# Patient Record
Sex: FEMALE | Race: WHITE | NOT HISPANIC OR LATINO | Employment: UNEMPLOYED | URBAN - METROPOLITAN AREA
[De-identification: names, ages, dates, MRNs, and addresses within clinical notes are randomized per-mention and may not be internally consistent; named-entity substitution may affect disease eponyms.]

---

## 2017-01-09 ENCOUNTER — ALLSCRIPTS OFFICE VISIT (OUTPATIENT)
Dept: OTHER | Facility: OTHER | Age: 52
End: 2017-01-09

## 2017-01-12 ENCOUNTER — HOSPITAL ENCOUNTER (OUTPATIENT)
Dept: RADIOLOGY | Facility: MEDICAL CENTER | Age: 52
Discharge: HOME | End: 2017-01-12
Payer: COMMERCIAL

## 2017-01-12 DIAGNOSIS — R14.0 ABDOMINAL DISTENSION (GASEOUS): ICD-10-CM

## 2017-01-12 DIAGNOSIS — R19.8 OTHER SPECIFIED SYMPTOMS AND SIGNS INVOLVING THE DIGESTIVE SYSTEM AND ABDOMEN: ICD-10-CM

## 2017-01-12 DIAGNOSIS — K21.9 GASTRO-ESOPHAGEAL REFLUX DISEASE WITHOUT ESOPHAGITIS: ICD-10-CM

## 2017-01-12 PROCEDURE — 76700 US EXAM ABDOM COMPLETE: CPT

## 2017-01-13 ENCOUNTER — APPOINTMENT (EMERGENCY)
Dept: CT IMAGING | Facility: HOSPITAL | Age: 52
End: 2017-01-13
Payer: COMMERCIAL

## 2017-01-13 ENCOUNTER — HOSPITAL ENCOUNTER (EMERGENCY)
Facility: HOSPITAL | Age: 52
Discharge: HOME | End: 2017-01-13
Attending: EMERGENCY MEDICINE | Admitting: EMERGENCY MEDICINE
Payer: COMMERCIAL

## 2017-01-13 VITALS
TEMPERATURE: 98 F | HEART RATE: 64 BPM | BODY MASS INDEX: 23.04 KG/M2 | WEIGHT: 130 LBS | SYSTOLIC BLOOD PRESSURE: 113 MMHG | RESPIRATION RATE: 18 BRPM | HEIGHT: 63 IN | OXYGEN SATURATION: 96 % | DIASTOLIC BLOOD PRESSURE: 65 MMHG

## 2017-01-13 DIAGNOSIS — K62.5 RECTAL BLEEDING: Primary | ICD-10-CM

## 2017-01-13 LAB
ALBUMIN SERPL BCP-MCNC: 3.9 G/DL (ref 3.5–5)
ALP SERPL-CCNC: 117 U/L (ref 46–116)
ALT SERPL W P-5'-P-CCNC: 37 U/L (ref 12–78)
ANION GAP SERPL CALCULATED.3IONS-SCNC: 7 MMOL/L (ref 4–13)
AST SERPL W P-5'-P-CCNC: 17 U/L (ref 5–45)
BASOPHILS # BLD AUTO: 0.02 THOUSANDS/ΜL (ref 0–0.1)
BASOPHILS NFR BLD AUTO: 0 % (ref 0–1)
BILIRUB DIRECT SERPL-MCNC: 0.04 MG/DL (ref 0–0.2)
BILIRUB SERPL-MCNC: 0.2 MG/DL (ref 0.2–1)
BUN SERPL-MCNC: 15 MG/DL (ref 5–25)
CALCIUM SERPL-MCNC: 8.8 MG/DL (ref 8.3–10.1)
CHLORIDE SERPL-SCNC: 104 MMOL/L (ref 100–108)
CO2 SERPL-SCNC: 30 MMOL/L (ref 21–32)
CREAT SERPL-MCNC: 0.64 MG/DL (ref 0.6–1.3)
EOSINOPHIL # BLD AUTO: 0.14 THOUSAND/ΜL (ref 0–0.61)
EOSINOPHIL NFR BLD AUTO: 2 % (ref 0–6)
ERYTHROCYTE [DISTWIDTH] IN BLOOD BY AUTOMATED COUNT: 11.9 % (ref 11.6–15.1)
GFR SERPL CREATININE-BSD FRML MDRD: >60 ML/MIN/1.73SQ M
GLUCOSE SERPL-MCNC: 229 MG/DL (ref 65–140)
HCT VFR BLD AUTO: 39 % (ref 34.8–46.1)
HGB BLD-MCNC: 13 G/DL (ref 11.5–15.4)
LYMPHOCYTES # BLD AUTO: 2.63 THOUSANDS/ΜL (ref 0.6–4.47)
LYMPHOCYTES NFR BLD AUTO: 39 % (ref 14–44)
MCH RBC QN AUTO: 28.7 PG (ref 26.8–34.3)
MCHC RBC AUTO-ENTMCNC: 33.3 G/DL (ref 31.4–37.4)
MCV RBC AUTO: 86 FL (ref 82–98)
MONOCYTES # BLD AUTO: 0.41 THOUSAND/ΜL (ref 0.17–1.22)
MONOCYTES NFR BLD AUTO: 6 % (ref 4–12)
NEUTROPHILS # BLD AUTO: 3.51 THOUSANDS/ΜL (ref 1.85–7.62)
NEUTS SEG NFR BLD AUTO: 52 % (ref 43–75)
NRBC BLD AUTO-RTO: 0 /100 WBCS
PLATELET # BLD AUTO: 195 THOUSANDS/UL (ref 149–390)
PMV BLD AUTO: 9.3 FL (ref 8.9–12.7)
POTASSIUM SERPL-SCNC: 4 MMOL/L (ref 3.5–5.3)
PROT SERPL-MCNC: 7.6 G/DL (ref 6.4–8.2)
RBC # BLD AUTO: 4.53 MILLION/UL (ref 3.81–5.12)
SODIUM SERPL-SCNC: 141 MMOL/L (ref 136–145)
WBC # BLD AUTO: 6.74 THOUSAND/UL (ref 4.31–10.16)

## 2017-01-13 PROCEDURE — 82272 OCCULT BLD FECES 1-3 TESTS: CPT

## 2017-01-13 PROCEDURE — 74177 CT ABD & PELVIS W/CONTRAST: CPT

## 2017-01-13 PROCEDURE — 85025 COMPLETE CBC W/AUTO DIFF WBC: CPT | Performed by: EMERGENCY MEDICINE

## 2017-01-13 PROCEDURE — 99285 EMERGENCY DEPT VISIT HI MDM: CPT

## 2017-01-13 PROCEDURE — 36415 COLL VENOUS BLD VENIPUNCTURE: CPT | Performed by: EMERGENCY MEDICINE

## 2017-01-13 PROCEDURE — 80048 BASIC METABOLIC PNL TOTAL CA: CPT | Performed by: EMERGENCY MEDICINE

## 2017-01-13 PROCEDURE — 80076 HEPATIC FUNCTION PANEL: CPT | Performed by: EMERGENCY MEDICINE

## 2017-01-13 RX ORDER — LISINOPRIL 5 MG/1
5 TABLET ORAL DAILY
COMMUNITY
Start: 2016-12-21 | End: 2018-02-28 | Stop reason: SDUPTHER

## 2017-01-13 RX ORDER — OMEPRAZOLE 20 MG/1
20 CAPSULE, DELAYED RELEASE ORAL DAILY
COMMUNITY
Start: 2017-01-09 | End: 2018-02-28 | Stop reason: SDUPTHER

## 2017-01-13 RX ORDER — ATORVASTATIN CALCIUM 10 MG/1
10 TABLET, FILM COATED ORAL DAILY
COMMUNITY
Start: 2016-12-21 | End: 2018-02-28 | Stop reason: SDUPTHER

## 2017-01-13 RX ADMIN — IOHEXOL 100 ML: 350 INJECTION, SOLUTION INTRAVENOUS at 21:43

## 2017-01-18 ENCOUNTER — GENERIC CONVERSION - ENCOUNTER (OUTPATIENT)
Dept: OTHER | Facility: OTHER | Age: 52
End: 2017-01-18

## 2017-01-26 ENCOUNTER — ALLSCRIPTS OFFICE VISIT (OUTPATIENT)
Dept: OTHER | Facility: OTHER | Age: 52
End: 2017-01-26

## 2017-01-30 RX ORDER — SODIUM CHLORIDE, SODIUM LACTATE, POTASSIUM CHLORIDE, CALCIUM CHLORIDE 600; 310; 30; 20 MG/100ML; MG/100ML; MG/100ML; MG/100ML
100 INJECTION, SOLUTION INTRAVENOUS CONTINUOUS
Status: CANCELLED | OUTPATIENT
Start: 2017-02-03

## 2017-02-02 ENCOUNTER — ANESTHESIA EVENT (OUTPATIENT)
Dept: PERIOP | Facility: HOSPITAL | Age: 52
End: 2017-02-02
Payer: COMMERCIAL

## 2017-02-02 RX ORDER — CALCIUM CARBONATE 200(500)MG
1 TABLET,CHEWABLE ORAL DAILY
COMMUNITY

## 2017-02-02 RX ORDER — ASCORBIC ACID 500 MG
500 TABLET ORAL DAILY
COMMUNITY
End: 2020-04-17 | Stop reason: SDUPTHER

## 2017-02-02 RX ORDER — DOCUSATE SODIUM 100 MG/1
100 CAPSULE, LIQUID FILLED ORAL 2 TIMES DAILY PRN
COMMUNITY

## 2017-02-02 RX ORDER — ASPIRIN 325 MG
325 TABLET ORAL DAILY
COMMUNITY
End: 2019-02-26

## 2017-02-02 RX ORDER — DIMENHYDRINATE 50 MG/1
50 TABLET ORAL 2 TIMES DAILY
COMMUNITY

## 2017-02-03 ENCOUNTER — ANESTHESIA (OUTPATIENT)
Dept: PERIOP | Facility: HOSPITAL | Age: 52
End: 2017-02-03
Payer: COMMERCIAL

## 2017-02-03 ENCOUNTER — HOSPITAL ENCOUNTER (OUTPATIENT)
Facility: HOSPITAL | Age: 52
Setting detail: OUTPATIENT SURGERY
Discharge: HOME | End: 2017-02-03
Attending: SURGERY | Admitting: SURGERY
Payer: COMMERCIAL

## 2017-02-03 VITALS
SYSTOLIC BLOOD PRESSURE: 105 MMHG | TEMPERATURE: 97.5 F | HEIGHT: 63 IN | WEIGHT: 130 LBS | HEART RATE: 63 BPM | BODY MASS INDEX: 23.04 KG/M2 | RESPIRATION RATE: 20 BRPM | DIASTOLIC BLOOD PRESSURE: 66 MMHG | OXYGEN SATURATION: 97 %

## 2017-02-03 DIAGNOSIS — K62.5 RECTAL BLEEDING: ICD-10-CM

## 2017-02-03 DIAGNOSIS — K21.9 GERD (GASTROESOPHAGEAL REFLUX DISEASE): ICD-10-CM

## 2017-02-03 PROBLEM — K29.70 GASTRITIS: Status: ACTIVE | Noted: 2017-02-03

## 2017-02-03 PROBLEM — K63.5 COLON POLYP: Status: ACTIVE | Noted: 2017-02-03

## 2017-02-03 LAB — GLUCOSE SERPL-MCNC: 161 MG/DL (ref 65–140)

## 2017-02-03 PROCEDURE — 88342 IMHCHEM/IMCYTCHM 1ST ANTB: CPT | Performed by: SURGERY

## 2017-02-03 PROCEDURE — 82948 REAGENT STRIP/BLOOD GLUCOSE: CPT

## 2017-02-03 PROCEDURE — 88305 TISSUE EXAM BY PATHOLOGIST: CPT | Performed by: SURGERY

## 2017-02-03 PROCEDURE — 88341 IMHCHEM/IMCYTCHM EA ADD ANTB: CPT | Performed by: SURGERY

## 2017-02-03 RX ORDER — LIDOCAINE HYDROCHLORIDE 10 MG/ML
INJECTION, SOLUTION INFILTRATION; PERINEURAL AS NEEDED
Status: DISCONTINUED | OUTPATIENT
Start: 2017-02-03 | End: 2017-02-03 | Stop reason: SURG

## 2017-02-03 RX ORDER — SODIUM CHLORIDE, SODIUM LACTATE, POTASSIUM CHLORIDE, CALCIUM CHLORIDE 600; 310; 30; 20 MG/100ML; MG/100ML; MG/100ML; MG/100ML
50 INJECTION, SOLUTION INTRAVENOUS CONTINUOUS
Status: DISCONTINUED | OUTPATIENT
Start: 2017-02-03 | End: 2017-02-03 | Stop reason: HOSPADM

## 2017-02-03 RX ORDER — PROPOFOL 10 MG/ML
INJECTION, EMULSION INTRAVENOUS AS NEEDED
Status: DISCONTINUED | OUTPATIENT
Start: 2017-02-03 | End: 2017-02-03 | Stop reason: SURG

## 2017-02-03 RX ORDER — PROPOFOL 10 MG/ML
INJECTION, EMULSION INTRAVENOUS CONTINUOUS PRN
Status: DISCONTINUED | OUTPATIENT
Start: 2017-02-03 | End: 2017-02-03 | Stop reason: SURG

## 2017-02-03 RX ADMIN — PROPOFOL 150 MG: 10 INJECTION, EMULSION INTRAVENOUS at 09:59

## 2017-02-03 RX ADMIN — SODIUM CHLORIDE, SODIUM LACTATE, POTASSIUM CHLORIDE, AND CALCIUM CHLORIDE 50 ML/HR: .6; .31; .03; .02 INJECTION, SOLUTION INTRAVENOUS at 09:31

## 2017-02-03 RX ADMIN — PROPOFOL 110 MCG/KG/MIN: 10 INJECTION, EMULSION INTRAVENOUS at 10:02

## 2017-02-03 RX ADMIN — PROPOFOL 30 MG: 10 INJECTION, EMULSION INTRAVENOUS at 10:10

## 2017-02-03 RX ADMIN — LIDOCAINE HYDROCHLORIDE 50 MG: 10 INJECTION, SOLUTION INFILTRATION; PERINEURAL at 09:55

## 2017-02-03 RX ADMIN — SODIUM CHLORIDE, SODIUM LACTATE, POTASSIUM CHLORIDE, AND CALCIUM CHLORIDE: .6; .31; .03; .02 INJECTION, SOLUTION INTRAVENOUS at 09:54

## 2017-02-13 ENCOUNTER — ALLSCRIPTS OFFICE VISIT (OUTPATIENT)
Dept: OTHER | Facility: OTHER | Age: 52
End: 2017-02-13

## 2017-02-16 ENCOUNTER — ALLSCRIPTS OFFICE VISIT (OUTPATIENT)
Dept: OTHER | Facility: OTHER | Age: 52
End: 2017-02-16

## 2017-02-16 ENCOUNTER — TRANSCRIBE ORDERS (OUTPATIENT)
Dept: ADMINISTRATIVE | Facility: HOSPITAL | Age: 52
End: 2017-02-16

## 2017-02-16 DIAGNOSIS — K43.6 OTHER AND UNSPECIFIED VENTRAL HERNIA WITH OBSTRUCTION, WITHOUT GANGRENE: ICD-10-CM

## 2017-02-16 DIAGNOSIS — K43.6: Primary | ICD-10-CM

## 2017-03-01 ENCOUNTER — HOSPITAL ENCOUNTER (OUTPATIENT)
Dept: CT IMAGING | Facility: HOSPITAL | Age: 52
Discharge: HOME | End: 2017-03-01
Attending: SURGERY
Payer: COMMERCIAL

## 2017-03-01 ENCOUNTER — OFFICE VISIT (OUTPATIENT)
Dept: LAB | Facility: HOSPITAL | Age: 52
End: 2017-03-01
Attending: SURGERY
Payer: COMMERCIAL

## 2017-03-01 DIAGNOSIS — K43.6: ICD-10-CM

## 2017-03-01 DIAGNOSIS — K43.6 OTHER AND UNSPECIFIED VENTRAL HERNIA WITH OBSTRUCTION, WITHOUT GANGRENE: ICD-10-CM

## 2017-03-01 LAB
ATRIAL RATE: 57 BPM
P AXIS: 48 DEGREES
PR INTERVAL: 148 MS
QRS AXIS: 21 DEGREES
QRSD INTERVAL: 92 MS
QT INTERVAL: 436 MS
QTC INTERVAL: 424 MS
T WAVE AXIS: 14 DEGREES
VENTRICULAR RATE: 57 BPM

## 2017-03-01 PROCEDURE — 93005 ELECTROCARDIOGRAM TRACING: CPT

## 2017-03-01 PROCEDURE — 74177 CT ABD & PELVIS W/CONTRAST: CPT

## 2017-03-01 RX ADMIN — IOHEXOL 100 ML: 350 INJECTION, SOLUTION INTRAVENOUS at 08:57

## 2017-03-09 ENCOUNTER — TRANSCRIBE ORDERS (OUTPATIENT)
Dept: ADMINISTRATIVE | Facility: HOSPITAL | Age: 52
End: 2017-03-09

## 2017-03-09 ENCOUNTER — ALLSCRIPTS OFFICE VISIT (OUTPATIENT)
Dept: OTHER | Facility: OTHER | Age: 52
End: 2017-03-09

## 2017-03-09 ENCOUNTER — APPOINTMENT (OUTPATIENT)
Dept: LAB | Facility: MEDICAL CENTER | Age: 52
End: 2017-03-09
Payer: COMMERCIAL

## 2017-03-09 DIAGNOSIS — E11.9 TYPE 2 DIABETES MELLITUS WITHOUT COMPLICATION, WITHOUT LONG-TERM CURRENT USE OF INSULIN (HCC): ICD-10-CM

## 2017-03-09 DIAGNOSIS — E11.9 TYPE 2 DIABETES MELLITUS WITHOUT COMPLICATION, WITHOUT LONG-TERM CURRENT USE OF INSULIN (HCC): Primary | ICD-10-CM

## 2017-03-09 LAB
ALBUMIN SERPL BCP-MCNC: 3.8 G/DL (ref 3.5–5)
ALP SERPL-CCNC: 112 U/L (ref 46–116)
ALT SERPL W P-5'-P-CCNC: 28 U/L (ref 12–78)
ANION GAP SERPL CALCULATED.3IONS-SCNC: 7 MMOL/L (ref 4–13)
AST SERPL W P-5'-P-CCNC: 12 U/L (ref 5–45)
BASOPHILS # BLD AUTO: 0.01 THOUSANDS/ΜL (ref 0–0.1)
BASOPHILS NFR BLD AUTO: 0 % (ref 0–1)
BILIRUB SERPL-MCNC: 0.77 MG/DL (ref 0.2–1)
BUN SERPL-MCNC: 12 MG/DL (ref 5–25)
CALCIUM SERPL-MCNC: 9.6 MG/DL (ref 8.3–10.1)
CHLORIDE SERPL-SCNC: 105 MMOL/L (ref 100–108)
CO2 SERPL-SCNC: 30 MMOL/L (ref 21–32)
CREAT SERPL-MCNC: 0.65 MG/DL (ref 0.6–1.3)
EOSINOPHIL # BLD AUTO: 0.17 THOUSAND/ΜL (ref 0–0.61)
EOSINOPHIL NFR BLD AUTO: 3 % (ref 0–6)
ERYTHROCYTE [DISTWIDTH] IN BLOOD BY AUTOMATED COUNT: 12.2 % (ref 11.6–15.1)
EST. AVERAGE GLUCOSE BLD GHB EST-MCNC: 194 MG/DL
GFR SERPL CREATININE-BSD FRML MDRD: >60 ML/MIN/1.73SQ M
GLUCOSE SERPL-MCNC: 185 MG/DL (ref 65–140)
HBA1C MFR BLD: 8.4 % (ref 4.2–6.3)
HCT VFR BLD AUTO: 42.2 % (ref 34.8–46.1)
HGB BLD-MCNC: 14.2 G/DL (ref 11.5–15.4)
LYMPHOCYTES # BLD AUTO: 2.67 THOUSANDS/ΜL (ref 0.6–4.47)
LYMPHOCYTES NFR BLD AUTO: 49 % (ref 14–44)
MCH RBC QN AUTO: 29.4 PG (ref 26.8–34.3)
MCHC RBC AUTO-ENTMCNC: 33.6 G/DL (ref 31.4–37.4)
MCV RBC AUTO: 87 FL (ref 82–98)
MONOCYTES # BLD AUTO: 0.25 THOUSAND/ΜL (ref 0.17–1.22)
MONOCYTES NFR BLD AUTO: 5 % (ref 4–12)
NEUTROPHILS # BLD AUTO: 2.35 THOUSANDS/ΜL (ref 1.85–7.62)
NEUTS SEG NFR BLD AUTO: 43 % (ref 43–75)
NRBC BLD AUTO-RTO: 0 /100 WBCS
PLATELET # BLD AUTO: 200 THOUSANDS/UL (ref 149–390)
PMV BLD AUTO: 10.8 FL (ref 8.9–12.7)
POTASSIUM SERPL-SCNC: 4.2 MMOL/L (ref 3.5–5.3)
PROT SERPL-MCNC: 7.6 G/DL (ref 6.4–8.2)
RBC # BLD AUTO: 4.83 MILLION/UL (ref 3.81–5.12)
SODIUM SERPL-SCNC: 142 MMOL/L (ref 136–145)
WBC # BLD AUTO: 5.45 THOUSAND/UL (ref 4.31–10.16)

## 2017-03-09 PROCEDURE — 85025 COMPLETE CBC W/AUTO DIFF WBC: CPT

## 2017-03-09 PROCEDURE — 83036 HEMOGLOBIN GLYCOSYLATED A1C: CPT | Performed by: FAMILY MEDICINE

## 2017-03-09 PROCEDURE — 36415 COLL VENOUS BLD VENIPUNCTURE: CPT | Performed by: FAMILY MEDICINE

## 2017-03-09 PROCEDURE — 80053 COMPREHEN METABOLIC PANEL: CPT

## 2017-03-16 ENCOUNTER — ANESTHESIA EVENT (OUTPATIENT)
Dept: PERIOP | Facility: HOSPITAL | Age: 52
End: 2017-03-16
Payer: COMMERCIAL

## 2017-03-17 ENCOUNTER — HOSPITAL ENCOUNTER (OUTPATIENT)
Facility: HOSPITAL | Age: 52
Setting detail: OUTPATIENT SURGERY
Discharge: HOME | End: 2017-03-17
Attending: SURGERY | Admitting: SURGERY
Payer: COMMERCIAL

## 2017-03-17 ENCOUNTER — ANESTHESIA (OUTPATIENT)
Dept: PERIOP | Facility: HOSPITAL | Age: 52
End: 2017-03-17
Payer: COMMERCIAL

## 2017-03-17 VITALS
WEIGHT: 130.51 LBS | HEIGHT: 63 IN | TEMPERATURE: 99.2 F | DIASTOLIC BLOOD PRESSURE: 55 MMHG | RESPIRATION RATE: 12 BRPM | BODY MASS INDEX: 23.12 KG/M2 | SYSTOLIC BLOOD PRESSURE: 109 MMHG | OXYGEN SATURATION: 95 % | HEART RATE: 83 BPM

## 2017-03-17 PROBLEM — K43.6 VENTRAL HERNIA WITH OBSTRUCTION AND WITHOUT GANGRENE: Status: ACTIVE | Noted: 2017-03-17

## 2017-03-17 PROBLEM — K42.9 UMBILICAL HERNIA WITHOUT OBSTRUCTION AND WITHOUT GANGRENE: Status: ACTIVE | Noted: 2017-03-17

## 2017-03-17 LAB
GLUCOSE SERPL-MCNC: 150 MG/DL (ref 65–140)
GLUCOSE SERPL-MCNC: 188 MG/DL (ref 65–140)

## 2017-03-17 PROCEDURE — A9270 NON-COVERED ITEM OR SERVICE: HCPCS | Performed by: SURGERY

## 2017-03-17 PROCEDURE — 82948 REAGENT STRIP/BLOOD GLUCOSE: CPT

## 2017-03-17 PROCEDURE — C1781 MESH (IMPLANTABLE): HCPCS | Performed by: SURGERY

## 2017-03-17 PROCEDURE — A9270 NON-COVERED ITEM OR SERVICE: HCPCS | Performed by: STUDENT IN AN ORGANIZED HEALTH CARE EDUCATION/TRAINING PROGRAM

## 2017-03-17 DEVICE — VENTRALIGHT ST MESH
Type: IMPLANTABLE DEVICE | Site: UMBILICAL | Status: FUNCTIONAL
Brand: VENTRALIGHT ST

## 2017-03-17 RX ORDER — LIDOCAINE HYDROCHLORIDE 10 MG/ML
INJECTION, SOLUTION INFILTRATION; PERINEURAL AS NEEDED
Status: DISCONTINUED | OUTPATIENT
Start: 2017-03-17 | End: 2017-03-17 | Stop reason: SURG

## 2017-03-17 RX ORDER — PROMETHAZINE HYDROCHLORIDE 25 MG/ML
12.5 INJECTION, SOLUTION INTRAMUSCULAR; INTRAVENOUS ONCE AS NEEDED
Status: DISCONTINUED | OUTPATIENT
Start: 2017-03-17 | End: 2017-03-17 | Stop reason: HOSPADM

## 2017-03-17 RX ORDER — SODIUM CHLORIDE, SODIUM LACTATE, POTASSIUM CHLORIDE, CALCIUM CHLORIDE 600; 310; 30; 20 MG/100ML; MG/100ML; MG/100ML; MG/100ML
75 INJECTION, SOLUTION INTRAVENOUS CONTINUOUS
Status: DISCONTINUED | OUTPATIENT
Start: 2017-03-17 | End: 2017-03-17 | Stop reason: HOSPADM

## 2017-03-17 RX ORDER — PROPOFOL 10 MG/ML
INJECTION, EMULSION INTRAVENOUS AS NEEDED
Status: DISCONTINUED | OUTPATIENT
Start: 2017-03-17 | End: 2017-03-17 | Stop reason: SURG

## 2017-03-17 RX ORDER — ONDANSETRON 2 MG/ML
4 INJECTION INTRAMUSCULAR; INTRAVENOUS ONCE
Status: DISCONTINUED | OUTPATIENT
Start: 2017-03-17 | End: 2017-03-17 | Stop reason: HOSPADM

## 2017-03-17 RX ORDER — FENTANYL CITRATE 50 UG/ML
INJECTION, SOLUTION INTRAMUSCULAR; INTRAVENOUS AS NEEDED
Status: DISCONTINUED | OUTPATIENT
Start: 2017-03-17 | End: 2017-03-17 | Stop reason: SURG

## 2017-03-17 RX ORDER — CLINDAMYCIN PHOSPHATE 900 MG/50ML
900 INJECTION INTRAVENOUS ONCE
Status: DISCONTINUED | OUTPATIENT
Start: 2017-03-17 | End: 2017-03-17 | Stop reason: HOSPADM

## 2017-03-17 RX ORDER — KETOROLAC TROMETHAMINE 30 MG/ML
30 INJECTION, SOLUTION INTRAMUSCULAR; INTRAVENOUS EVERY 6 HOURS PRN
Status: DISCONTINUED | OUTPATIENT
Start: 2017-03-17 | End: 2017-03-17 | Stop reason: HOSPADM

## 2017-03-17 RX ORDER — ROCURONIUM BROMIDE 10 MG/ML
INJECTION, SOLUTION INTRAVENOUS AS NEEDED
Status: DISCONTINUED | OUTPATIENT
Start: 2017-03-17 | End: 2017-03-17 | Stop reason: SURG

## 2017-03-17 RX ORDER — FAMOTIDINE 20 MG/1
20 TABLET, FILM COATED ORAL 2 TIMES DAILY
COMMUNITY
End: 2019-02-26

## 2017-03-17 RX ORDER — MORPHINE SULFATE 2 MG/ML
2 INJECTION, SOLUTION INTRAMUSCULAR; INTRAVENOUS EVERY 2 HOUR PRN
Status: DISCONTINUED | OUTPATIENT
Start: 2017-03-17 | End: 2017-03-17 | Stop reason: HOSPADM

## 2017-03-17 RX ORDER — GLYCOPYRROLATE 0.2 MG/ML
INJECTION INTRAMUSCULAR; INTRAVENOUS AS NEEDED
Status: DISCONTINUED | OUTPATIENT
Start: 2017-03-17 | End: 2017-03-17 | Stop reason: SURG

## 2017-03-17 RX ORDER — OXYCODONE HYDROCHLORIDE AND ACETAMINOPHEN 5; 325 MG/1; MG/1
1 TABLET ORAL EVERY 4 HOURS PRN
Status: DISCONTINUED | OUTPATIENT
Start: 2017-03-17 | End: 2017-03-17 | Stop reason: HOSPADM

## 2017-03-17 RX ORDER — ONDANSETRON 2 MG/ML
INJECTION INTRAMUSCULAR; INTRAVENOUS AS NEEDED
Status: DISCONTINUED | OUTPATIENT
Start: 2017-03-17 | End: 2017-03-17 | Stop reason: SURG

## 2017-03-17 RX ORDER — ONDANSETRON 2 MG/ML
4 INJECTION INTRAMUSCULAR; INTRAVENOUS ONCE AS NEEDED
Status: DISCONTINUED | OUTPATIENT
Start: 2017-03-17 | End: 2017-03-17 | Stop reason: HOSPADM

## 2017-03-17 RX ORDER — MIDAZOLAM HYDROCHLORIDE 1 MG/ML
INJECTION INTRAMUSCULAR; INTRAVENOUS AS NEEDED
Status: DISCONTINUED | OUTPATIENT
Start: 2017-03-17 | End: 2017-03-17 | Stop reason: SURG

## 2017-03-17 RX ORDER — OXYCODONE HYDROCHLORIDE AND ACETAMINOPHEN 5; 325 MG/1; MG/1
1 TABLET ORAL EVERY 4 HOURS PRN
Qty: 30 TABLET | Refills: 0 | Status: SHIPPED | OUTPATIENT
Start: 2017-03-17 | End: 2017-07-23

## 2017-03-17 RX ORDER — FENTANYL CITRATE/PF 50 MCG/ML
25 SYRINGE (ML) INJECTION
Status: DISCONTINUED | OUTPATIENT
Start: 2017-03-17 | End: 2017-03-17 | Stop reason: HOSPADM

## 2017-03-17 RX ORDER — SODIUM CHLORIDE, SODIUM LACTATE, POTASSIUM CHLORIDE, CALCIUM CHLORIDE 600; 310; 30; 20 MG/100ML; MG/100ML; MG/100ML; MG/100ML
100 INJECTION, SOLUTION INTRAVENOUS CONTINUOUS
Status: DISCONTINUED | OUTPATIENT
Start: 2017-03-17 | End: 2017-03-17 | Stop reason: HOSPADM

## 2017-03-17 RX ORDER — CLINDAMYCIN PHOSPHATE 150 MG/ML
INJECTION, SOLUTION INTRAVENOUS AS NEEDED
Status: DISCONTINUED | OUTPATIENT
Start: 2017-03-17 | End: 2017-03-17 | Stop reason: SURG

## 2017-03-17 RX ORDER — BUPIVACAINE HYDROCHLORIDE 2.5 MG/ML
INJECTION, SOLUTION EPIDURAL; INFILTRATION; INTRACAUDAL AS NEEDED
Status: DISCONTINUED | OUTPATIENT
Start: 2017-03-17 | End: 2017-03-17 | Stop reason: HOSPADM

## 2017-03-17 RX ADMIN — CLINDAMYCIN PHOSPHATE 900 MG: 150 INJECTION, SOLUTION INTRAMUSCULAR; INTRAVENOUS at 08:12

## 2017-03-17 RX ADMIN — NEOSTIGMINE METHYLSULFATE 4 MG: 1 INJECTION INTRAMUSCULAR; INTRAVENOUS; SUBCUTANEOUS at 08:53

## 2017-03-17 RX ADMIN — HYDROMORPHONE HYDROCHLORIDE 0.2 MG: 1 INJECTION, SOLUTION INTRAMUSCULAR; INTRAVENOUS; SUBCUTANEOUS at 10:05

## 2017-03-17 RX ADMIN — HYDROMORPHONE HYDROCHLORIDE 0.2 MG: 1 INJECTION, SOLUTION INTRAMUSCULAR; INTRAVENOUS; SUBCUTANEOUS at 09:37

## 2017-03-17 RX ADMIN — KETOROLAC TROMETHAMINE 30 MG: 30 INJECTION, SOLUTION INTRAMUSCULAR at 10:49

## 2017-03-17 RX ADMIN — SODIUM CHLORIDE, SODIUM LACTATE, POTASSIUM CHLORIDE, AND CALCIUM CHLORIDE 75 ML/HR: .6; .31; .03; .02 INJECTION, SOLUTION INTRAVENOUS at 09:33

## 2017-03-17 RX ADMIN — GLYCOPYRROLATE 0.6 MG: 0.2 INJECTION, SOLUTION INTRAMUSCULAR; INTRAVENOUS at 08:53

## 2017-03-17 RX ADMIN — FENTANYL CITRATE 50 MCG: 50 INJECTION, SOLUTION INTRAMUSCULAR; INTRAVENOUS at 08:05

## 2017-03-17 RX ADMIN — FENTANYL CITRATE 25 MCG: 50 INJECTION INTRAMUSCULAR; INTRAVENOUS at 09:24

## 2017-03-17 RX ADMIN — HYDROMORPHONE HYDROCHLORIDE 0.2 MG: 1 INJECTION, SOLUTION INTRAMUSCULAR; INTRAVENOUS; SUBCUTANEOUS at 09:50

## 2017-03-17 RX ADMIN — DEXAMETHASONE SODIUM PHOSPHATE 10 MG: 10 INJECTION INTRAMUSCULAR; INTRAVENOUS at 08:15

## 2017-03-17 RX ADMIN — SODIUM CHLORIDE, SODIUM LACTATE, POTASSIUM CHLORIDE, AND CALCIUM CHLORIDE 100 ML/HR: .6; .31; .03; .02 INJECTION, SOLUTION INTRAVENOUS at 07:20

## 2017-03-17 RX ADMIN — FENTANYL CITRATE 50 MCG: 50 INJECTION, SOLUTION INTRAMUSCULAR; INTRAVENOUS at 08:08

## 2017-03-17 RX ADMIN — OXYCODONE HYDROCHLORIDE AND ACETAMINOPHEN 1 TABLET: 5; 325 TABLET ORAL at 12:01

## 2017-03-17 RX ADMIN — ONDANSETRON 4 MG: 2 INJECTION INTRAMUSCULAR; INTRAVENOUS at 08:02

## 2017-03-17 RX ADMIN — MIDAZOLAM HYDROCHLORIDE 2 MG: 1 INJECTION, SOLUTION INTRAMUSCULAR; INTRAVENOUS at 07:59

## 2017-03-17 RX ADMIN — GLYCOPYRROLATE 0.1 MG: 0.2 INJECTION, SOLUTION INTRAMUSCULAR; INTRAVENOUS at 08:02

## 2017-03-17 RX ADMIN — ENOXAPARIN SODIUM 40 MG: 40 INJECTION SUBCUTANEOUS at 07:35

## 2017-03-17 RX ADMIN — HYDROMORPHONE HYDROCHLORIDE 0.2 MG: 1 INJECTION, SOLUTION INTRAMUSCULAR; INTRAVENOUS; SUBCUTANEOUS at 09:55

## 2017-03-17 RX ADMIN — LIDOCAINE HYDROCHLORIDE 50 MG: 10 INJECTION, SOLUTION INFILTRATION; PERINEURAL at 08:05

## 2017-03-17 RX ADMIN — FENTANYL CITRATE 25 MCG: 50 INJECTION INTRAMUSCULAR; INTRAVENOUS at 09:29

## 2017-03-17 RX ADMIN — PROPOFOL 150 MG: 10 INJECTION, EMULSION INTRAVENOUS at 08:05

## 2017-03-17 RX ADMIN — ROCURONIUM BROMIDE 30 MG: 10 INJECTION, SOLUTION INTRAVENOUS at 08:05

## 2017-03-22 ENCOUNTER — ALLSCRIPTS OFFICE VISIT (OUTPATIENT)
Dept: OTHER | Facility: OTHER | Age: 52
End: 2017-03-22

## 2017-03-30 ENCOUNTER — ALLSCRIPTS OFFICE VISIT (OUTPATIENT)
Dept: OTHER | Facility: OTHER | Age: 52
End: 2017-03-30

## 2017-04-20 ENCOUNTER — ALLSCRIPTS OFFICE VISIT (OUTPATIENT)
Dept: OTHER | Facility: OTHER | Age: 52
End: 2017-04-20

## 2017-04-24 ENCOUNTER — ALLSCRIPTS OFFICE VISIT (OUTPATIENT)
Dept: OTHER | Facility: OTHER | Age: 52
End: 2017-04-24

## 2017-04-24 DIAGNOSIS — R06.02 SHORTNESS OF BREATH: ICD-10-CM

## 2017-06-02 ENCOUNTER — TRANSCRIBE ORDERS (OUTPATIENT)
Dept: ADMINISTRATIVE | Facility: HOSPITAL | Age: 52
End: 2017-06-02

## 2017-06-02 DIAGNOSIS — Q24.9 HEART ABNORMALITY: Primary | ICD-10-CM

## 2017-06-21 ENCOUNTER — HOSPITAL ENCOUNTER (OUTPATIENT)
Dept: NON INVASIVE DIAGNOSTICS | Facility: CLINIC | Age: 52
Discharge: HOME | End: 2017-06-21
Payer: COMMERCIAL

## 2017-06-21 DIAGNOSIS — R06.02 SHORTNESS OF BREATH: ICD-10-CM

## 2017-06-21 LAB
CHEST PAIN STATEMENT: NORMAL
MAX DIASTOLIC BP: 80 MMHG
MAX HEART RATE: 144 BPM
MAX PREDICTED HEART RATE: 169 BPM
MAX. SYSTOLIC BP: 160 MMHG
PROTOCOL NAME: NORMAL
TARGET HR FORMULA: NORMAL
TEST INDICATION: NORMAL
TIME IN EXERCISE PHASE: 526 S

## 2017-06-21 PROCEDURE — 93017 CV STRESS TEST TRACING ONLY: CPT

## 2017-06-22 ENCOUNTER — HOSPITAL ENCOUNTER (OUTPATIENT)
Dept: NON INVASIVE DIAGNOSTICS | Facility: MEDICAL CENTER | Age: 52
Discharge: HOME | End: 2017-06-22
Payer: COMMERCIAL

## 2017-06-22 DIAGNOSIS — Q24.9 HEART ABNORMALITY: ICD-10-CM

## 2017-06-22 PROCEDURE — 93306 TTE W/DOPPLER COMPLETE: CPT

## 2017-07-23 ENCOUNTER — HOSPITAL ENCOUNTER (EMERGENCY)
Facility: HOSPITAL | Age: 52
Discharge: HOME | End: 2017-07-23
Attending: EMERGENCY MEDICINE | Admitting: EMERGENCY MEDICINE
Payer: COMMERCIAL

## 2017-07-23 VITALS
SYSTOLIC BLOOD PRESSURE: 123 MMHG | RESPIRATION RATE: 16 BRPM | TEMPERATURE: 98.6 F | OXYGEN SATURATION: 99 % | WEIGHT: 137.79 LBS | HEART RATE: 58 BPM | BODY MASS INDEX: 24.41 KG/M2 | DIASTOLIC BLOOD PRESSURE: 78 MMHG

## 2017-07-23 DIAGNOSIS — F68.12: Primary | ICD-10-CM

## 2017-07-23 LAB
ANION GAP SERPL CALCULATED.3IONS-SCNC: 10 MMOL/L (ref 4–13)
BASOPHILS # BLD AUTO: 0.01 THOUSANDS/ΜL (ref 0–0.1)
BASOPHILS NFR BLD AUTO: 0 % (ref 0–1)
BUN SERPL-MCNC: 13 MG/DL (ref 5–25)
CALCIUM SERPL-MCNC: 8.7 MG/DL (ref 8.3–10.1)
CHLORIDE SERPL-SCNC: 103 MMOL/L (ref 100–108)
CO2 SERPL-SCNC: 27 MMOL/L (ref 21–32)
CREAT SERPL-MCNC: 0.73 MG/DL (ref 0.6–1.3)
EOSINOPHIL # BLD AUTO: 0.16 THOUSAND/ΜL (ref 0–0.61)
EOSINOPHIL NFR BLD AUTO: 3 % (ref 0–6)
ERYTHROCYTE [DISTWIDTH] IN BLOOD BY AUTOMATED COUNT: 11.8 % (ref 11.6–15.1)
GFR SERPL CREATININE-BSD FRML MDRD: >60 ML/MIN/1.73SQ M
GLUCOSE SERPL-MCNC: 258 MG/DL (ref 65–140)
GLUCOSE SERPL-MCNC: 296 MG/DL (ref 65–140)
GLUCOSE SERPL-MCNC: 322 MG/DL (ref 65–140)
HCT VFR BLD AUTO: 39.9 % (ref 34.8–46.1)
HGB BLD-MCNC: 13.2 G/DL (ref 11.5–15.4)
LYMPHOCYTES # BLD AUTO: 2.4 THOUSANDS/ΜL (ref 0.6–4.47)
LYMPHOCYTES NFR BLD AUTO: 47 % (ref 14–44)
MCH RBC QN AUTO: 28.6 PG (ref 26.8–34.3)
MCHC RBC AUTO-ENTMCNC: 33.1 G/DL (ref 31.4–37.4)
MCV RBC AUTO: 87 FL (ref 82–98)
MONOCYTES # BLD AUTO: 0.31 THOUSAND/ΜL (ref 0.17–1.22)
MONOCYTES NFR BLD AUTO: 6 % (ref 4–12)
NEUTROPHILS # BLD AUTO: 2.24 THOUSANDS/ΜL (ref 1.85–7.62)
NEUTS SEG NFR BLD AUTO: 44 % (ref 43–75)
NRBC BLD AUTO-RTO: 0 /100 WBCS
PLATELET # BLD AUTO: 170 THOUSANDS/UL (ref 149–390)
PMV BLD AUTO: 10.1 FL (ref 8.9–12.7)
POTASSIUM SERPL-SCNC: 4 MMOL/L (ref 3.5–5.3)
RBC # BLD AUTO: 4.61 MILLION/UL (ref 3.81–5.12)
SODIUM SERPL-SCNC: 140 MMOL/L (ref 136–145)
WBC # BLD AUTO: 5.14 THOUSAND/UL (ref 4.31–10.16)

## 2017-07-23 PROCEDURE — 82948 REAGENT STRIP/BLOOD GLUCOSE: CPT

## 2017-07-23 PROCEDURE — 36415 COLL VENOUS BLD VENIPUNCTURE: CPT | Performed by: PHYSICIAN ASSISTANT

## 2017-07-23 PROCEDURE — 85025 COMPLETE CBC W/AUTO DIFF WBC: CPT | Performed by: PHYSICIAN ASSISTANT

## 2017-07-23 PROCEDURE — 99284 EMERGENCY DEPT VISIT MOD MDM: CPT

## 2017-07-23 PROCEDURE — 96360 HYDRATION IV INFUSION INIT: CPT

## 2017-07-23 PROCEDURE — 80048 BASIC METABOLIC PNL TOTAL CA: CPT | Performed by: PHYSICIAN ASSISTANT

## 2017-07-23 RX ADMIN — SODIUM CHLORIDE 1000 ML: 0.9 INJECTION, SOLUTION INTRAVENOUS at 18:50

## 2017-08-24 ENCOUNTER — GENERIC CONVERSION - ENCOUNTER (OUTPATIENT)
Dept: OTHER | Facility: OTHER | Age: 52
End: 2017-08-24

## 2017-08-28 ENCOUNTER — ALLSCRIPTS OFFICE VISIT (OUTPATIENT)
Dept: OTHER | Facility: OTHER | Age: 52
End: 2017-08-28

## 2017-08-28 DIAGNOSIS — Z12.31 ENCOUNTER FOR SCREENING MAMMOGRAM FOR MALIGNANT NEOPLASM OF BREAST: ICD-10-CM

## 2017-08-28 DIAGNOSIS — E78.5 HYPERLIPIDEMIA: ICD-10-CM

## 2017-08-28 DIAGNOSIS — E11.65 TYPE 2 DIABETES MELLITUS WITH HYPERGLYCEMIA (HCC): ICD-10-CM

## 2017-09-14 ENCOUNTER — HOSPITAL ENCOUNTER (EMERGENCY)
Facility: HOSPITAL | Age: 52
Discharge: HOME | End: 2017-09-15
Attending: EMERGENCY MEDICINE | Admitting: EMERGENCY MEDICINE
Payer: COMMERCIAL

## 2017-09-14 ENCOUNTER — APPOINTMENT (EMERGENCY)
Dept: CT IMAGING | Facility: HOSPITAL | Age: 52
End: 2017-09-14
Payer: COMMERCIAL

## 2017-09-14 VITALS
WEIGHT: 134 LBS | SYSTOLIC BLOOD PRESSURE: 134 MMHG | DIASTOLIC BLOOD PRESSURE: 82 MMHG | HEART RATE: 85 BPM | BODY MASS INDEX: 23.74 KG/M2 | TEMPERATURE: 98.6 F | OXYGEN SATURATION: 98 % | RESPIRATION RATE: 19 BRPM | HEIGHT: 63 IN

## 2017-09-14 DIAGNOSIS — R10.9 ABDOMINAL PAIN: Primary | ICD-10-CM

## 2017-09-14 LAB
ALBUMIN SERPL BCP-MCNC: 3.9 G/DL (ref 3.5–5)
ALP SERPL-CCNC: 116 U/L (ref 46–116)
ALT SERPL W P-5'-P-CCNC: 22 U/L (ref 12–78)
ANION GAP SERPL CALCULATED.3IONS-SCNC: 10 MMOL/L (ref 4–13)
AST SERPL W P-5'-P-CCNC: 10 U/L (ref 5–45)
BACTERIA UR QL AUTO: ABNORMAL /HPF
BASOPHILS # BLD AUTO: 0.02 THOUSANDS/ΜL (ref 0–0.1)
BASOPHILS NFR BLD AUTO: 0 % (ref 0–1)
BILIRUB SERPL-MCNC: 0.3 MG/DL (ref 0.2–1)
BILIRUB UR QL STRIP: NEGATIVE
BUN SERPL-MCNC: 18 MG/DL (ref 5–25)
CALCIUM SERPL-MCNC: 9.7 MG/DL (ref 8.3–10.1)
CHLORIDE SERPL-SCNC: 104 MMOL/L (ref 100–108)
CLARITY UR: CLEAR
CO2 SERPL-SCNC: 27 MMOL/L (ref 21–32)
COLOR UR: YELLOW
CREAT SERPL-MCNC: 0.67 MG/DL (ref 0.6–1.3)
EOSINOPHIL # BLD AUTO: 0.13 THOUSAND/ΜL (ref 0–0.61)
EOSINOPHIL NFR BLD AUTO: 2 % (ref 0–6)
ERYTHROCYTE [DISTWIDTH] IN BLOOD BY AUTOMATED COUNT: 11.6 % (ref 11.6–15.1)
EXT PREG TEST URINE: NORMAL
GFR SERPL CREATININE-BSD FRML MDRD: 102 ML/MIN/1.73SQ M
GLUCOSE SERPL-MCNC: 159 MG/DL (ref 65–140)
GLUCOSE SERPL-MCNC: 159 MG/DL (ref 65–140)
GLUCOSE UR STRIP-MCNC: NEGATIVE MG/DL
HCT VFR BLD AUTO: 38.6 % (ref 34.8–46.1)
HGB BLD-MCNC: 13 G/DL (ref 11.5–15.4)
HGB UR QL STRIP.AUTO: NEGATIVE
KETONES UR STRIP-MCNC: NEGATIVE MG/DL
LEUKOCYTE ESTERASE UR QL STRIP: ABNORMAL
LIPASE SERPL-CCNC: 155 U/L (ref 73–393)
LYMPHOCYTES # BLD AUTO: 3.4 THOUSANDS/ΜL (ref 0.6–4.47)
LYMPHOCYTES NFR BLD AUTO: 46 % (ref 14–44)
MCH RBC QN AUTO: 28.4 PG (ref 26.8–34.3)
MCHC RBC AUTO-ENTMCNC: 33.7 G/DL (ref 31.4–37.4)
MCV RBC AUTO: 85 FL (ref 82–98)
MONOCYTES # BLD AUTO: 0.5 THOUSAND/ΜL (ref 0.17–1.22)
MONOCYTES NFR BLD AUTO: 7 % (ref 4–12)
NEUTROPHILS # BLD AUTO: 3.38 THOUSANDS/ΜL (ref 1.85–7.62)
NEUTS SEG NFR BLD AUTO: 46 % (ref 43–75)
NITRITE UR QL STRIP: NEGATIVE
NON-SQ EPI CELLS URNS QL MICRO: ABNORMAL /HPF
NRBC BLD AUTO-RTO: 0 /100 WBCS
PH UR STRIP.AUTO: 5.5 [PH] (ref 4.5–8)
PLATELET # BLD AUTO: 177 THOUSANDS/UL (ref 149–390)
PMV BLD AUTO: 9.9 FL (ref 8.9–12.7)
POTASSIUM SERPL-SCNC: 3.8 MMOL/L (ref 3.5–5.3)
PROT SERPL-MCNC: 7.6 G/DL (ref 6.4–8.2)
PROT UR STRIP-MCNC: NEGATIVE MG/DL
RBC # BLD AUTO: 4.57 MILLION/UL (ref 3.81–5.12)
RBC #/AREA URNS AUTO: ABNORMAL /HPF
SODIUM SERPL-SCNC: 141 MMOL/L (ref 136–145)
SP GR UR STRIP.AUTO: >=1.03 (ref 1–1.03)
UROBILINOGEN UR QL STRIP.AUTO: 0.2 E.U./DL
WBC # BLD AUTO: 7.44 THOUSAND/UL (ref 4.31–10.16)
WBC #/AREA URNS AUTO: ABNORMAL /HPF

## 2017-09-14 PROCEDURE — 81001 URINALYSIS AUTO W/SCOPE: CPT | Performed by: EMERGENCY MEDICINE

## 2017-09-14 PROCEDURE — 81025 URINE PREGNANCY TEST: CPT | Performed by: EMERGENCY MEDICINE

## 2017-09-14 PROCEDURE — 96374 THER/PROPH/DIAG INJ IV PUSH: CPT

## 2017-09-14 PROCEDURE — 82948 REAGENT STRIP/BLOOD GLUCOSE: CPT

## 2017-09-14 PROCEDURE — 36415 COLL VENOUS BLD VENIPUNCTURE: CPT | Performed by: EMERGENCY MEDICINE

## 2017-09-14 PROCEDURE — 85025 COMPLETE CBC W/AUTO DIFF WBC: CPT | Performed by: EMERGENCY MEDICINE

## 2017-09-14 PROCEDURE — 83690 ASSAY OF LIPASE: CPT | Performed by: EMERGENCY MEDICINE

## 2017-09-14 PROCEDURE — 74177 CT ABD & PELVIS W/CONTRAST: CPT

## 2017-09-14 PROCEDURE — 80053 COMPREHEN METABOLIC PANEL: CPT | Performed by: EMERGENCY MEDICINE

## 2017-09-14 RX ORDER — MORPHINE SULFATE 15 MG/1
7.5 TABLET ORAL EVERY 6 HOURS PRN
Qty: 6 TABLET | Refills: 0 | Status: SHIPPED | OUTPATIENT
Start: 2017-09-14 | End: 2017-09-24

## 2017-09-14 RX ORDER — MORPHINE SULFATE 10 MG/ML
6 INJECTION, SOLUTION INTRAMUSCULAR; INTRAVENOUS ONCE
Status: COMPLETED | OUTPATIENT
Start: 2017-09-14 | End: 2017-09-14

## 2017-09-14 RX ORDER — DICYCLOMINE HCL 20 MG
20 TABLET ORAL 3 TIMES DAILY PRN
Qty: 30 TABLET | Refills: 0 | Status: SHIPPED | OUTPATIENT
Start: 2017-09-14 | End: 2018-02-28 | Stop reason: SDUPTHER

## 2017-09-14 RX ADMIN — MORPHINE SULFATE 6 MG: 10 INJECTION, SOLUTION INTRAMUSCULAR; INTRAVENOUS at 22:21

## 2017-09-14 RX ADMIN — IOHEXOL 100 ML: 350 INJECTION, SOLUTION INTRAVENOUS at 23:01

## 2017-09-15 PROCEDURE — 99284 EMERGENCY DEPT VISIT MOD MDM: CPT

## 2017-09-18 ENCOUNTER — GENERIC CONVERSION - ENCOUNTER (OUTPATIENT)
Dept: OTHER | Facility: OTHER | Age: 52
End: 2017-09-18

## 2017-10-21 ENCOUNTER — TRANSCRIBE ORDERS (OUTPATIENT)
Dept: ADMINISTRATIVE | Facility: HOSPITAL | Age: 52
End: 2017-10-21

## 2017-10-21 ENCOUNTER — APPOINTMENT (OUTPATIENT)
Dept: LAB | Facility: MEDICAL CENTER | Age: 52
End: 2017-10-21
Payer: COMMERCIAL

## 2017-10-21 DIAGNOSIS — E11.8 UNCONTROLLED TYPE 2 DIABETES MELLITUS WITH COMPLICATION, UNSPECIFIED LONG TERM INSULIN USE STATUS: Primary | ICD-10-CM

## 2017-10-21 DIAGNOSIS — E11.65 UNCONTROLLED TYPE 2 DIABETES MELLITUS WITH COMPLICATION, UNSPECIFIED LONG TERM INSULIN USE STATUS: Primary | ICD-10-CM

## 2017-10-21 DIAGNOSIS — E78.5 HYPERLIPIDEMIA: ICD-10-CM

## 2017-10-21 DIAGNOSIS — E11.65 TYPE 2 DIABETES MELLITUS WITH HYPERGLYCEMIA (HCC): ICD-10-CM

## 2017-10-21 LAB
ALBUMIN SERPL BCP-MCNC: 3.6 G/DL (ref 3.5–5)
ALP SERPL-CCNC: 113 U/L (ref 46–116)
ALT SERPL W P-5'-P-CCNC: 21 U/L (ref 12–78)
ANION GAP SERPL CALCULATED.3IONS-SCNC: 8 MMOL/L (ref 4–13)
AST SERPL W P-5'-P-CCNC: 11 U/L (ref 5–45)
BILIRUB SERPL-MCNC: 0.42 MG/DL (ref 0.2–1)
BUN SERPL-MCNC: 14 MG/DL (ref 5–25)
CALCIUM SERPL-MCNC: 8.9 MG/DL (ref 8.3–10.1)
CHLORIDE SERPL-SCNC: 106 MMOL/L (ref 100–108)
CHOLEST SERPL-MCNC: 169 MG/DL (ref 50–200)
CO2 SERPL-SCNC: 28 MMOL/L (ref 21–32)
CREAT SERPL-MCNC: 0.62 MG/DL (ref 0.6–1.3)
CREAT UR-MCNC: 175 MG/DL
EST. AVERAGE GLUCOSE BLD GHB EST-MCNC: 189 MG/DL
GFR SERPL CREATININE-BSD FRML MDRD: 105 ML/MIN/1.73SQ M
GLUCOSE P FAST SERPL-MCNC: 148 MG/DL (ref 65–99)
HBA1C MFR BLD: 8.2 % (ref 4.2–6.3)
HDLC SERPL-MCNC: 51 MG/DL (ref 40–60)
LDLC SERPL CALC-MCNC: 82 MG/DL (ref 0–100)
MICROALBUMIN UR-MCNC: 14.2 MG/L (ref 0–20)
MICROALBUMIN/CREAT 24H UR: 8 MG/G CREATININE (ref 0–30)
POTASSIUM SERPL-SCNC: 4 MMOL/L (ref 3.5–5.3)
PROT SERPL-MCNC: 7.4 G/DL (ref 6.4–8.2)
SODIUM SERPL-SCNC: 142 MMOL/L (ref 136–145)
TRIGL SERPL-MCNC: 179 MG/DL

## 2017-10-21 PROCEDURE — 80053 COMPREHEN METABOLIC PANEL: CPT

## 2017-10-21 PROCEDURE — 80061 LIPID PANEL: CPT

## 2017-10-21 PROCEDURE — 82043 UR ALBUMIN QUANTITATIVE: CPT | Performed by: INTERNAL MEDICINE

## 2017-10-21 PROCEDURE — 36415 COLL VENOUS BLD VENIPUNCTURE: CPT

## 2017-10-21 PROCEDURE — 83036 HEMOGLOBIN GLYCOSYLATED A1C: CPT

## 2017-10-21 PROCEDURE — 82570 ASSAY OF URINE CREATININE: CPT | Performed by: INTERNAL MEDICINE

## 2017-10-26 ENCOUNTER — HOSPITAL ENCOUNTER (EMERGENCY)
Facility: HOSPITAL | Age: 52
Discharge: HOME | End: 2017-10-26
Attending: EMERGENCY MEDICINE | Admitting: EMERGENCY MEDICINE
Payer: COMMERCIAL

## 2017-10-26 VITALS
TEMPERATURE: 98.3 F | DIASTOLIC BLOOD PRESSURE: 64 MMHG | HEART RATE: 67 BPM | BODY MASS INDEX: 23.74 KG/M2 | SYSTOLIC BLOOD PRESSURE: 128 MMHG | WEIGHT: 134 LBS | RESPIRATION RATE: 18 BRPM | OXYGEN SATURATION: 99 %
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
## 2017-10-26 PROCEDURE — 99282 EMERGENCY DEPT VISIT SF MDM: CPT

## 2017-10-26 RX ORDER — DOXYCYCLINE HYCLATE 100 MG/1
100 CAPSULE ORAL ONCE
Status: COMPLETED | OUTPATIENT
Start: 2017-10-26 | End: 2017-10-26

## 2017-10-26 RX ORDER — IBUPROFEN 600 MG/1
600 TABLET ORAL EVERY 6 HOURS PRN
Qty: 12 TABLET | Refills: 0 | Status: SHIPPED | OUTPATIENT
Start: 2017-10-26 | End: 2019-02-26

## 2017-10-26 RX ORDER — IBUPROFEN 400 MG/1
800 TABLET ORAL ONCE
Status: COMPLETED | OUTPATIENT
Start: 2017-10-26 | End: 2017-10-26

## 2017-10-26 RX ADMIN — IBUPROFEN 800 MG: 400 TABLET, FILM COATED ORAL at 07:43

## 2017-10-26 RX ADMIN — DOXYCYCLINE HYCLATE 100 MG: 100 CAPSULE, GELATIN COATED ORAL at 07:43

## 2017-10-26 NOTE — ED PROVIDER NOTES
History  Chief Complaint   Patient presents with    Tick Bite     Patient reports found what she thinks is a tick near R armpit around 4 am  Patient pulled something out of it and its now very red and painful     HPI  28-year-old white female with a chief complaint of finding a tick under her right armpit around 4:00 a m  Matty De Souza Patient states she pulled some thin out now it has pain:  Red  Patient believes that there is something still in there  Patient has a history of Lyme disease in the past about for 5 years ago due to a tick bite  Prior to Admission Medications   Prescriptions Last Dose Informant Patient Reported? Taking?    Cyanocobalamin (B-12 PO)   Yes No   Sig: Take 1 tablet by mouth     ascorbic acid (VITAMIN C) 500 mg tablet  Self Yes No   Sig: Take 500 mg by mouth daily   aspirin 325 mg tablet   Yes No   Sig: Take 325 mg by mouth daily   atorvastatin (LIPITOR) 10 mg tablet  Self Yes No   Sig: Take 10 mg by mouth daily     calcium carbonate (TUMS) 500 mg chewable tablet   Yes No   Sig: Chew 1 tablet daily   dicyclomine (BENTYL) 20 mg tablet   No No   Sig: Take 1 tablet by mouth 3 (three) times a day as needed (cramping or abdominal pain) for up to 10 days   dimenhyDRINATE (DRAMAMINE) 50 mg tablet  Self Yes No   Sig: Take 50 mg by mouth 2 (two) times a day     docusate sodium (COLACE) 100 mg capsule  Self Yes No   Sig: Take 100 mg by mouth 2 (two) times a day as needed     famotidine (PEPCID) 20 mg tablet   Yes No   Sig: Take 20 mg by mouth 2 (two) times a day   lisinopril (ZESTRIL) 5 mg tablet  Self Yes No   Sig: Take 5 mg by mouth daily     metFORMIN (GLUCOPHAGE) 500 mg tablet  Self Yes No   Sig: Take 850 mg by mouth 2 (two) times a day with meals     omeprazole (PriLOSEC) 20 mg delayed release capsule  Self Yes No   Sig: Take 20 mg by mouth daily        Facility-Administered Medications: None       Past Medical History:   Diagnosis Date    Diabetes mellitus (HCC)     GERD (gastroesophageal reflux disease)     Hypertension     Lyme disease     [facility]oid [facility]) [facility]     Rheumatoid arteritis     Rheumatoid arthritis ([facility]     osteoarthritis    Stroke ([facility] )     Vertigo        Past Surgical History:   Procedure Laterality Date    BACK SURGERY      CYST REMOVAL      EGD AND COLONOSCOPY N/A 2/3/2017    Procedure: COLONOSCOPY; POSSIBLE POLYPECTOMY; POSSIBLE BIOPSY AND EGD ;  Surgeon: Lennox Hillock, MD;  Location: [facility]; Service:     OTHER SURGICAL HISTORY      excision of sebaceous cst to back    VENTRAL HERNIA REPAIR N/A 3/17/2017    Procedure: LAPAROSCOPIC VENTRAL HERNIA REPAIR WITH MESH ;  Surgeon: Lennox Hillock, MD;  Location: [facility];  Service:        Family History   Problem Relation Age of Onset    COPD Mother     Diabetes Mother     Heart disease Father     Stroke Father      I have reviewed and agree with the history as documented  Social History   Substance Use Topics    Smoking status: Former Smoker     Quit date: 1990    Smokeless tobacco: Never Used    Alcohol use No        Review of Systems   Constitutional: Negative for chills and fever  HENT: Negative for congestion and rhinorrhea  Eyes: Negative for discharge and visual disturbance  Respiratory: Negative for shortness of breath and wheezing  Cardiovascular: Negative for chest pain and palpitations  Gastrointestinal: Negative for abdominal pain and vomiting  Endocrine: Negative for polydipsia and polyuria  Genitourinary: Negative for dysuria and hematuria  Musculoskeletal: Negative for arthralgias, gait problem and neck stiffness  Skin: Positive for wound  Negative for rash  Neurological: Negative for dizziness and headaches  Psychiatric/Behavioral: Negative for confusion and suicidal ideas         Physical Exam  ED Triage Vitals [10/26/17 0634]   Temperature Pulse Respirations Blood Pressure SpO2   98 3 °F (36 8 °C) 67 18 128/64 99 %      Temp Source Heart Rate Source Patient Position - Orthostatic VS BP Location FiO2 (%)   Oral Monitor Lying Right arm --      Pain Score       Worst Possible Pain           Orthostatic Vital Signs  Vitals:    10/26/17 0634   BP: 128/64   Pulse: 67   Patient Position - Orthostatic VS: Lying       Physical Exam   Constitutional: She is oriented to person, place, and time  She appears well-developed and well-nourished  HENT:   Head: Normocephalic and atraumatic  Mouth/Throat: Oropharynx is clear and moist    Eyes: EOM are normal  Pupils are equal, round, and reactive to light  Neck: Normal range of motion  Neck supple  Cardiovascular: Normal rate, regular rhythm and normal heart sounds  Pulmonary/Chest: Effort normal and breath sounds normal  No respiratory distress  She has no wheezes  She exhibits no tenderness  Abdominal: Soft  Bowel sounds are normal  She exhibits no distension  There is no tenderness  There is no guarding  Musculoskeletal: Normal range of motion  Neurological: She is alert and oriented to person, place, and time  No cranial nerve deficit  She exhibits normal muscle tone  Coordination normal    Skin: Skin is warm and dry  There is erythema  Right axilla:  Positive quarter-inch by quarter-inch excoriated area under the right axilla  There is a pin sized black lesion which is probably consistent with the part of the tick's  Body/ head  This was removed by a 25 gauge needle and forceps  Psychiatric: She has a normal mood and affect         ED Medications  Medications   doxycycline hyclate (VIBRAMYCIN) capsule 100 mg (100 mg Oral Given 10/26/17 0743)   ibuprofen (MOTRIN) tablet 800 mg (800 mg Oral Given 10/26/17 0743)       Diagnostic Studies  Results Reviewed     None                 No orders to display              Procedures  Foreign Body - Embedded  Date/Time: 10/26/2017 5:56 PM  Performed by: Rosalba Signs by: Betsy Luna     Patient location:  ED  Consent:     Consent obtained:  Verbal    Consent given by:  Patient    Risks discussed:  Bleeding, pain and incomplete removal    Alternatives discussed:  No treatment  Universal protocol:     Procedure explained and questions answered to patient or proxy's satisfaction: yes      Patient identity confirmed:  Verbally with patient  Location:     Location:  Arm    Arm location:  R upper arm    Depth: Intradermal    Tendon involvement:  None  Pre-procedure details:     Imaging:  None  Anesthesia (see MAR for exact dosages): Anesthesia method:  None  Procedure details:     Localization method:  Visualized    Dissection of underlying tissues: yes      Removal mechanism:  Needle and forceps    Removal Method:  Open    Procedure complexity:  Simple    Foreign bodies recovered:  1    Intact foreign body removal: yes    Post-procedure details:     Neurovascular status: intact      Confirmation:  No additional foreign bodies on visualization    Skin closure:  None    Dressing:  Adhesive bandage    Patient tolerance of procedure: Tolerated well, no immediate complications  Comments:      Patient was rather anxious in removal of the tick part however I explained to her that the numbing medicine with her just as much as removing the small piece that was left behind  The rest of the tick was removed on the 2nd attempt completely and patient was given Motrin for the pain and prophylaxis dose of doxycycline  Phone Contacts  ED Phone Contact    ED Course  ED Course                                MDM  CritCare Time    1  Removal of tick from armpit  2  Tick bite  3  Excoriated lesion under right axilla  4  Rule out Lyme's dx  Disposition  Final diagnoses:   Tick bite with subsequent removal of tick     Time reflects when diagnosis was documented in both MDM as applicable and the Disposition within this note     Time User Action Codes Description Comment    10/26/2017  7:30 AM Earline Gonzalez Add Gustavo Mascorro  XXXA] Tick bite with subsequent removal of tick       ED Disposition     ED Disposition Condition Comment    Discharge  Meryle Plant Flyte discharge to home/self care      Condition at discharge: Good        Follow-up Information     Follow up With Specialties Details Why Contact Info    Iwona West MD Family Medicine In 14 days  [address] [address]  [phone]          Discharge Medication List as of 10/26/2017  7:32 AM      CONTINUE these medications which have NOT CHANGED    Details   ascorbic acid (VITAMIN C) 500 mg tablet Take 500 mg by mouth daily, Until Discontinued, Historical Med      aspirin 325 mg tablet Take 325 mg by mouth daily, Until Discontinued, Historical Med      atorvastatin (LIPITOR) 10 mg tablet Take 10 mg by mouth daily  , Starting 12/21/2016, Until Discontinued, Historical Med      calcium carbonate (TUMS) 500 mg chewable tablet Chew 1 tablet daily, Until Discontinued, Historical Med      Cyanocobalamin (B-12 PO) Take 1 tablet by mouth  , Until Discontinued, Historical Med      dicyclomine (BENTYL) 20 mg tablet Take 1 tablet by mouth 3 (three) times a day as needed (cramping or abdominal pain) for up to 10 days, Starting Thu 9/14/2017, Until Sun 9/24/2017, Print      dimenhyDRINATE (DRAMAMINE) 50 mg tablet Take 50 mg by mouth 2 (two) times a day  , Until Discontinued, Historical Med      docusate sodium (COLACE) 100 mg capsule Take 100 mg by mouth 2 (two) times a day as needed  , Until Discontinued, Historical Med      famotidine (PEPCID) 20 mg tablet Take 20 mg by mouth 2 (two) times a day, Until Discontinued, Historical Med      lisinopril (ZESTRIL) 5 mg tablet Take 5 mg by mouth daily  , Starting 12/21/2016, Until Discontinued, Historical Med      metFORMIN (GLUCOPHAGE) 500 mg tablet Take 850 mg by mouth 2 (two) times a day with meals  , Starting Fri 12/16/2016, Historical Med      omeprazole (PriLOSEC) 20 mg delayed release capsule Take 20 mg by mouth daily  , Starting 1/9/2017, Until Discontinued, Historical Med No discharge procedures on file      ED Provider  Electronically Signed by           Rachele Sebastian DO  10/26/17 9271

## 2017-10-26 NOTE — DISCHARGE INSTRUCTIONS
Tick Bite   WHAT YOU NEED TO KNOW:   Most tick bites are not dangerous, but ticks can pass disease or infection when they bite  A tick will bite and then move further into the skin, where it stays to feed on blood  Ticks need to be removed quickly  Serious symptoms of a tick bite need immediate treatment  DISCHARGE INSTRUCTIONS:   Medicines:   · Antibiotics:  Healthcare providers may give you antibiotics if you get an infection from the tick bite  Do not stop taking the antibiotics until you talk to your healthcare provider, even if you feel better  · Antihistamines:  Antihistamines decrease swelling and itching  · Local anesthetic:  This medicine helps to decrease pain and itching  · Skin protectant:  Skin protectants help soothe itchy, red skin  They may also keep out infection  Some examples of these medicines are calamine and zinc oxide  · Steroids: You may need to take steroids if you have a very bad reaction to your tick bite  Take steroids with food to keep your stomach from becoming upset from the medicine  Do not stop taking this medicine until you talk to your healthcare provider  · Topical steroids:  A topical steroid is medicine that you rub into your skin to decrease redness and itching  Topical steroids are available without a doctor's order  Do not use this medicine on areas of skin that are cut, scratched, or infected  · Take your medicine as directed  Call your healthcare provider if you think your medicine is not helping or if you have side effects  Tell him if you are allergic to any medicine  Keep a list of the medicines, vitamins, and herbs you take  Include the amounts, and when and why you take them  Bring the list or the pill bottles to follow-up visits  Carry your medicine list with you in case of an emergency  Follow up with your healthcare provider as directed:  Write down your questions so you remember to ask them during your visits     How to remove a tick:  Ticks must be removed as soon as possible to help prevent them from passing disease or infection  You are less likely to get sick from a tick bite if you remove the tick within 24 hours  Do the following to remove a tick:  · Soak a cotton ball with rubbing alcohol, or use a disposable alcohol wipe  Gently clean the skin around the tick  · Grasp the tick as close to your skin as possible  Pull the tick straight up and out with tweezers, or with fingertips protected by a tissue or gloves  Do not touch the tick with your bare hands  · Pull gently until the tick lets go  Do not twist or jerk the tick suddenly, because this may break off the tick's head or mouth parts  You can buy a special V-shaped device that help lift ticks out safely  Do not leave any part of the tick in your skin  · Do not crush or squeeze the tick since its body may be infected with germs  Flush the tick down the toilet  · Do not put a hot match, petroleum jelly, or fingernail polish on the tick  This does not help and may be dangerous  · After the tick is removed, clean the area of the bite with rubbing alcohol  Then wash your hands with soap and water  Care for your tick bite:  Apply ice to the bite aracelis to help to decrease pain, itching and swelling  Put ice in a plastic bag  Cover the bag with a towel  Put the bag on your bite for 15 to 20 minutes every hour or as directed by your healthcare provider  Try not to scratch the bite  Prevent another tick bite:  Ticks live in areas covered by brush and grass  They may even be found in your lawn if you live in certain areas  Outdoor pets can carry ticks inside the house  Ticks can grab onto you or your clothes when you walk by grass or brush  If you go into areas that contain many trees, tall grasses, and underbrush, do the following:  · Wear protective clothing:  Wear pants and a long-sleeved shirt  Tuck your pants into your socks or boots  Tuck in your shirt   Wear sleeves that fit close to the skin at your wrists and neck  This will help prevent ticks from crawling through gaps in your clothing and onto your skin  Wear a hat in areas with trees  Wear light-colored clothing to make finding ticks easier  · Use insect repellant:  Put insect repellent on skin that is showing  The insect repellant should contain DEET  Do not put insect repellant on skin that is cut, scratched, or irritated  Do not put insect repellent on a child's face or hands  Always use soap and water to wash the insect repellant off as soon as possible once you are indoors  · Spray insect repellant onto your clothes:  Use permethrin spray  This spray kills ticks that crawl on your clothing  Be sure to spray the tops of your boots, bottom of pant legs, and sleeve cuffs  As soon as possible, wash and dry clothing that has been worn outdoors  · Check for ticks often:  Check your clothing, hair, and skin for ticks every 2 to 3 hours while you are outdoors  Carefully check the hairline, armpits, neck, and waist  Check your pets and children for ticks  Remove ticks from pets the same way as you remove them from people  · Decrease the risk of ticks in your yard:  Ticks like to live in [place], moist areas  Carlyle Jenkins your lawn regularly to keep the grass short  Trim the grass around birdbaths and fences  Cut branches that are overgrown and take them out of the yard  Clear out leaf piles  Anna Decent firewood in a dry, rachel area  Contact your healthcare provider if:   · You cannot remove the tick  · The tick's head is stuck in the skin  · You have questions or concerns about your condition or care  Seek care immediately or call 911 if:   · You get a fever, rash, headache, or muscle or joint pains within 1 month of a tick bite  These may be signs of a more serious disease  · You are having trouble walking or moving your legs    © 2016 [address] is for End User's use only and may not be sold, redistributed or otherwise used for commercial purposes  All illustrations and images included in CareNotes® are the copyrighted property of [employer]  or [address]  The above information is an  only  It is not intended as medical advice for individual conditions or treatments  Talk to your doctor, nurse or pharmacist before following any medical regimen to see if it is safe and effective for you

## 2017-11-08 ENCOUNTER — GENERIC CONVERSION - ENCOUNTER (OUTPATIENT)
Dept: OTHER | Facility: OTHER | Age: 52
End: 2017-11-08

## 2017-11-21 ENCOUNTER — APPOINTMENT (OUTPATIENT)
Dept: LAB | Facility: MEDICAL CENTER | Age: 52
End: 2017-11-21
Payer: COMMERCIAL
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
## 2017-11-21 PROCEDURE — 36415 COLL VENOUS BLD VENIPUNCTURE: CPT

## 2017-11-21 PROCEDURE — 86617 LYME DISEASE ANTIBODY: CPT

## 2017-11-22 LAB
B BURGDOR IGG PATRN SER IB-IMP: NEGATIVE
B BURGDOR IGM PATRN SER IB-IMP: NEGATIVE
B BURGDOR18KD IGG SER QL IB: ABNORMAL
B BURGDOR23KD IGG SER QL IB: ABNORMAL
B BURGDOR23KD IGM SER QL IB: PRESENT
B BURGDOR28KD IGG SER QL IB: ABNORMAL
B BURGDOR30KD IGG SER QL IB: ABNORMAL
B BURGDOR39KD IGG SER QL IB: PRESENT
B BURGDOR39KD IGM SER QL IB: ABNORMAL
B BURGDOR41KD IGG SER QL IB: ABNORMAL
B BURGDOR41KD IGM SER QL IB: ABNORMAL
B BURGDOR45KD IGG SER QL IB: ABNORMAL
B BURGDOR58KD IGG SER QL IB: ABNORMAL
B BURGDOR66KD IGG SER QL IB: ABNORMAL
B BURGDOR93KD IGG SER QL IB: ABNORMAL

## 2018-01-09 NOTE — PROGRESS NOTES
History of Present Illness  Care Coordination Encounter Information:   Type of Encounter: Telephonic   Contact: Follow-Up        Care Coordination SL Nurse [employer]:   The reason for call is to discuss outreach for follow up/needed services  Called patient to follow up and assess any needs or concerns  Three unsuccessful attempts made to contact patient  Active Problems    1  Dyslipidemia (272 4) (E78 5)   2  GERD (gastroesophageal reflux disease) (530 81) (K21 9)   3  History of Lyme disease (V12 09) (Z86 19)   4  Uncontrolled type 2 diabetes mellitus, without long-term current use of insulin (250 02)   (E11 65)   5  Viral gastroenteritis (008 8) (A08 4)    Past Medical History    1  History of Abdominal bloating (787 3) (R14 0)   2  History of Abdominal fullness (789 9) (R19 8)   3  History of Arthritis (716 90) (M19 90)   4  History of Benign paroxysmal positional vertigo due to bilateral vestibular disorder   (386 11) (H81 13)   5  History of Chest pain, cardiac (786 50) (R07 9)   6  History of Dry eye syndrome of bilateral lacrimal glands (375 15) (H04 123)   7  History of Encounter for gynecological examination without abnormal finding (V72 31)   (Z01 419)   8  History of Encounter for routine gynecological examination with Papanicolaou smear of   cervix (V72 31,V76 2) (Z01 419)   9  History of acute bronchitis (V12 69) (Z87 09)   10  History of colonic polyps (V12 72) (Z86 010)   11  Denied: History of depression   12  History of fatigue (V13 89) (Z87 898)   13  History of herpes zoster (V12 09) (Z86 19)   14  History of Lyme disease (V12 09) (Z86 19)   15  History of osteoarthritis (V13 4) (Z87 39)   16  History of rheumatoid arthritis (V13 4) (Z87 39)   17  History of screening mammography (V15 89) (Z92 89)   18  History of sebaceous cyst (V13 3) (Z87 2)   19  History of shortness of breath (V13 89) (Z87 898)   20  Denied: History of substance abuse   21  History of vertigo (V12 49) (Z87 898)   22  History of Lightheadedness (780 4) (R42)   23  History of Muscle weakness (generalized) (728 87) (M62 81)   24  History of Postoperative state (V45 89) (Z98 890)   25  History of Rectal bleeding (569 3) (K62 5)   26  History of Screen for colon cancer (V76 51) (Z12 11)   27  History of Screening for breast cancer (V76 10) (Z12 31)   28  History of Screening for cervical cancer (V76 2) (Z12 4)   29  History of Screening for HPV (human papillomavirus) (V73 81) (Z11 51)   30  History of Symptomatic hypotension (458 0) (I95 89)   31  History of Tachycardia (785 0) (R00 0)   32  History of Ventral hernia with obstruction and without gangrene (552 20) (K43 6)    Surgical History    1  History of Hernia Repair   2  History of Surgery Excision Lipoma   3  History of Tubal Ligation    Family History  Mother    1  Family history of Emphysema/COPD   2  Denied: Family history of substance abuse  Father    3  Family history of Emphysema/COPD   4  Denied: Family history of substance abuse   5  Family history of Myocardial infarct  Daughter    6  Family history of depression (V17 0) (Z81 8)  Paternal Grandmother    9  Family history of Diabetes  Paternal Aunt    6  Family history of Breast cancer    Social History    · Daily Coffee Consumption (___ Cups/Day)   · Daily Tea Consumption (___ Cups/Day)   · Former smoker (V15 82) (Z87 891)   ·    · Stopped Drinking Alcohol   · Uses Safety Equipment - Seatbelts    Current Meds    1  Omeprazole 20 MG Oral Capsule Delayed Release; TAKE 1 CAPSULE Daily after   supper; Therapy: 13QAX4685 to (UOMSOYLT:62WLD9970)  Requested for: 15KFL2702; Last   Rx:26Jan2017 Ordered    2  B-12 250 MCG Oral Tablet; Therapy: (Recorded:26Oct2015) to Recorded   3  [employer] Stool Softener 100 MG Oral Capsule; TAKE 1 CAPSULE DAILY; Therapy: 93TSO7968 to Recorded   4  Vitamin C 500 MG Oral Tablet; Therapy: (Recorded:26Oct2015) to Recorded    5   Atorvastatin Calcium 10 MG Oral Tablet (Lipitor); take one tablet by mouth every day; Therapy: 04Pxt6068 to (Evaluate:28Mar2018)  Requested for: 51FGZ0852; Last   Rx:29Sep2017 Ordered   6  MetFORMIN HCl - 1000 MG Oral Tablet; TAKE 1 TABLET in am and 850mg in pm;   Therapy: 77ZFA4798 to (Evaluate:24Feb2018)  Requested for: 15Tef8057; Last   Rx:28Aug2017 Ordered    7  Aspirin 325 MG Oral Tablet; Therapy: (Recorded:46Ggj2532) to Recorded   8  Bentyl 20 MG TABS (Dicyclomine HCl); Therapy: (Recorded:99Npe3139) to Recorded   9  Dramamine 50 MG Oral Tablet; Therapy: (Markieman Hughes) to Recorded   10  Morphine Sulfate 15 MG Oral Tablet; TAKE 0 5 TABLET Every 6 hours; Therapy: (Recorded:92Oqr7075) to Recorded   11  Pepcid 20 MG Oral Tablet (Famotidine); Therapy: (Recorded:08Jse2476) to Recorded   12  Tums 500 MG Oral Tablet Chewable; Therapy: (Recorded:04Qbv9375) to Recorded    Allergies    1  Penicillins    End of Encounter Meds    1  Omeprazole 20 MG Oral Capsule Delayed Release; TAKE 1 CAPSULE Daily after   supper; Therapy: 41QRI8556 to (QIXVTSRB:11NJE2821)  Requested for: 69KIX8058; Last   Rx:26Jan2017 Ordered    2  B-12 250 MCG Oral Tablet; Therapy: (Recorded:26Oct2015) to Recorded   3  [employer] Stool Softener 100 MG Oral Capsule; TAKE 1 CAPSULE DAILY; Therapy: 33SVY4615 to Recorded   4  Vitamin C 500 MG Oral Tablet; Therapy: (Recorded:26Oct2015) to Recorded    5  Atorvastatin Calcium 10 MG Oral Tablet (Lipitor); take one tablet by mouth every day; Therapy: 26Wxd8631 to (Evaluate:28Mar2018)  Requested for: 50ECZ2763; Last   Rx:29Sep2017 Ordered   6  MetFORMIN HCl - 1000 MG Oral Tablet; TAKE 1 TABLET in am and 850mg in pm;   Therapy: 32VSC5348 to (Evaluate:24Feb2018)  Requested for: 66Dzv8032; Last   Rx:28Aug2017 Ordered    7  Aspirin 325 MG Oral Tablet; Therapy: (Recorded:80Fid0636) to Recorded   8  Bentyl 20 MG TABS (Dicyclomine HCl); Therapy: (Recorded:16Uvf0496) to Recorded   9  Dramamine 50 MG Oral Tablet;    Therapy: (Markie Hughes) to Recorded   10  Morphine Sulfate 15 MG Oral Tablet; TAKE 0 5 TABLET Every 6 hours; Therapy: (Recorded:41Bhw8961) to Recorded   11  Pepcid 20 MG Oral Tablet (Famotidine); Therapy: (Recorded:30Xhu1193) to Recorded   12  Tums 500 MG Oral Tablet Chewable; Therapy: (Recorded:13Hsn3049) to Recorded    Future Appointments    Date/Time Provider Specialty Site   11/16/2017 01:00 PM Jewel Patton  Internal Medicine [facility]   [facility]     Message   Recorded as Task   Date: 10/17/2017 10:44 AM, Created By: Saintclair Mazzoni   Task Name: Call Back   Assigned To: Gilma Ordaz   Regarding Patient: Rabia Hall, Status: In Progress   CommentCreasie Patient - 17 Oct 2017 10:44 AM     TASK CREATED  Follow up call  There was no answer  Left message on machine with my contact information to return call  Saintair Payne - 17 Oct 2017 10:44 AM     TASK IN PROGRESS   Saintclair Mazzoni - 25 Oct 2017 3:14 PM     TASK EDITED  Second attempt to contact patient  There was no answer  Left message on machine with my contact information to return call  Saint Kerry - 08 Nov 2017 3:07 PM     TASK EDITED  Third attempt to contact patient  There was no answer and no machine to leave voice message       Patient Care Team    Care Team Member Role Specialty Office Number   Omar Turner MD Specialist Cardiology [phone]   [facility]  Family Medicine [phone]     Signatures   Electronically signed by : Maya Ramirez; Nov 8 2017  3:08PM EST                       (Author)

## 2018-01-10 NOTE — PROGRESS NOTES
Assessment    1  Acute bronchitis (466 0) (J20 9)   2  DM type 2 (diabetes mellitus, type 2) (250 00) (E11 9)   3  Ventral hernia (553 20) (K43 9)    Plan  Acute bronchitis    · LevoFLOXacin 500 MG Oral Tablet (Levaquin); Take 1 tablet daily    Chief Complaint  patient presented here for follow up on chronic conditions  she just had lab done this am       Review of Systems    Constitutional: no fever  ENT: no complaints of earache, no loss of hearing, no nose bleeds, no nasal discharge, no sore throat, no hoarseness  Cardiovascular: No complaints of slow heart rate, no fast heart rate, no chest pain, no palpitations, no leg claudication, no lower extremity edema  Respiratory: No complaints of shortness of breath, no wheezing, no cough, no SOB on exertion, no orthopnea, no PND  Gastrointestinal: No complaints of abdominal pain, no constipation, no nausea or vomiting, no diarrhea, no bloody stools  Active Problems    1  Abdominal bloating (787 3) (R14 0)   2  Abdominal fullness (789 9) (R19 8)   3  DM type 2 (diabetes mellitus, type 2) (250 00) (E11 9)   4  Encounter for gynecological examination without abnormal finding (V72 31) (Z01 419)   5  Encounter for routine gynecological examination with Papanicolaou smear of cervix   (V72 31,V76 2) (Z01 419)   6  Encounter for screening mammogram for breast cancer (V76 12) (Z12 31)   7  Encounter for screening mammogram for malignant neoplasm of breast (V76 12)   (Z12 31)   8  GERD (gastroesophageal reflux disease) (530 81) (K21 9)   9  History of Lyme disease (V12 09) (Z86 19)   10  Lightheadedness (780 4) (R42)   11  Rectal bleeding (569 3) (K62 5)   12  Screening for breast cancer (V76 10) (Z12 39)   13  Screening for cervical cancer (V76 2) (Z12 4)   14  Screening for HPV (human papillomavirus) (V73 81) (Z11 51)   15  Type 2 diabetes mellitus without complication, without long-term current use of insulin    (250 00) (E11 9)   16   Ventral hernia with obstruction and without gangrene (552 20) (K43 6)    Past Medical History    1  History of Arthritis (716 90) (M19 90)   2  History of Benign paroxysmal positional vertigo due to bilateral vestibular disorder   (386 11) (H81 13)   3  History of Chest pain, cardiac (786 50) (R07 9)   4  History of Dry eye syndrome of bilateral lacrimal glands (375 15) (H04 123)   5  History of fatigue (V13 89) (Z87 898)   6  History of herpes zoster (V12 09) (Z86 19)   7  History of Lyme disease (V12 09) (Z86 19)   8  History of osteoarthritis (V13 4) (Z87 39)   9  History of rheumatoid arthritis (V13 4) (Z87 39)   10  History of sebaceous cyst (V13 3) (Z87 2)   11  History of shortness of breath (V13 89) (Z87 898)   12  History of vertigo (V12 49) (Z87 898)   13  History of Muscle weakness (generalized) (728 87) (M62 81)   14  History of Screen for colon cancer (V76 51) (Z12 11)   15  History of Tachycardia (785 0) (R00 0)    The active problems and past medical history were reviewed and updated today  Surgical History    1  History of Surgery Excision Lipoma   2  History of Tubal Ligation    The surgical history was reviewed and updated today  Family History  Mother    1  Family history of Emphysema/COPD  Father    2  Family history of Emphysema/COPD   3  Family history of Myocardial infarct  Paternal Grandmother    3  Family history of Diabetes  Paternal Aunt    11  Family history of Breast cancer    The family history was reviewed and updated today  Social History    · Daily Coffee Consumption (___ Cups/Day)   · Daily Tea Consumption (___ Cups/Day)   · Former smoker (V15 82) (Z87 891)   ·    · Stopped Drinking Alcohol   · Uses Safety Equipment - Seatbelts  The social history was reviewed and updated today  The social history was reviewed and is unchanged  Current Meds   1  Atorvastatin Calcium 10 MG Oral Tablet; TAKE 1 TABLET DAILY;    Therapy: 26Imx8516 to (Faith Gaxiola)  Requested for: 35QYY3808; Last   Rx:53Qqe1538 Ordered   2  B-12 250 MCG Oral Tablet; Therapy: (Recorded:26Oct2015) to Recorded   3  [facility] Stool Softener 100 MG Oral Capsule; TAKE 1 CAPSULE DAILY; Therapy: 30CIK5213 to Recorded   4  Lisinopril 2 5 MG Oral Tablet; take 1 tablet by mouth every day; Therapy: 63Hdh2927 to (Evaluate:46Lco9262)  Requested for: 25WNO5467; Last   Rx:37Ztl0521 Ordered   5  MetFORMIN HCl - 500 MG Oral Tablet; TAKE 1 TABLET TWICE DAILY; Therapy: 50JRZ5976 to (Renew:28Apr2017)  Requested for: 66Drn8878; Last   Rx:27Feb2017 Ordered   6  Omeprazole 20 MG Oral Capsule Delayed Release; TAKE 1 CAPSULE Daily after   supper; Therapy: 47TCN3952 to (QRGGTDNX:23WVX1786)  Requested for: 02QNS1310; Last   Rx:26Jan2017 Ordered   7  Omeprazole 20 MG Oral Capsule Delayed Release; TAKE ONE CAPSULE BY MOUTH   EVERY DAY; Therapy: 61TXF4302 to (Last MI:40XYO8786)  Requested for: 66IFH1555 Ordered   8  Vitamin C 500 MG Oral Tablet; Therapy: (Recorded:26Oct2015) to Recorded    The medication list was reviewed and updated today  Allergies    1  Penicillins    Vitals  Vital Signs    Recorded: 82TXD3759 09:26AM   Temperature 97 9 F, Tympanic   Heart Rate 62   Pulse Quality Normal   Respiration Quality Normal   Respiration 16   Systolic 540, LUE, Sitting   Diastolic 62, LUE, Sitting   Height 5 ft 2 in   Weight 130 lb 4 oz   BMI Calculated 23 82   BSA Calculated 1 59   O2 Saturation 97     Physical Exam    Constitutional   General appearance: No acute distress, well appearing and well nourished  Eyes   Conjunctiva and lids: No swelling, erythema or discharge  Pupils and irises: Equal, round and reactive to light  Ears, Nose, Mouth, and Throat   External inspection of ears and nose: Normal     Otoscopic examination: Tympanic membranes translucent with normal light reflex  Canals patent without erythema  Nasal mucosa, septum, and turbinates: Normal without edema or erythema      Oropharynx: Normal with no erythema, edema, exudate or lesions  Pulmonary   Auscultation of lungs: Clear to auscultation  Cardiovascular   Auscultation of heart: Normal rate and rhythm, normal S1 and S2, without murmurs      Examination of extremities for edema and/or varicosities: Normal          Future Appointments    Date/Time Provider Specialty Site   03/30/2017 10:45 AM Luis Sen MD General Surgery [facility]   06/09/2017 10:30 AM Anita Ochoa DO Internal Medicine [facility]   07/26/2017 09:40 AM Olivia Nieto CNM Obstetrics/Gynecology [facility]     Signatures   Electronically signed by : Jeferson Davila DO; Mar  9 2017 11:03AM EST                       (Author)

## 2018-01-12 VITALS
SYSTOLIC BLOOD PRESSURE: 102 MMHG | BODY MASS INDEX: 24.15 KG/M2 | DIASTOLIC BLOOD PRESSURE: 64 MMHG | TEMPERATURE: 97.6 F | HEIGHT: 62 IN | WEIGHT: 131.25 LBS | RESPIRATION RATE: 16 BRPM | HEART RATE: 82 BPM | OXYGEN SATURATION: 98 %

## 2018-01-12 VITALS
TEMPERATURE: 97.9 F | SYSTOLIC BLOOD PRESSURE: 104 MMHG | HEART RATE: 62 BPM | BODY MASS INDEX: 23.97 KG/M2 | RESPIRATION RATE: 16 BRPM | DIASTOLIC BLOOD PRESSURE: 62 MMHG | HEIGHT: 62 IN | WEIGHT: 130.25 LBS | OXYGEN SATURATION: 97 %

## 2018-01-12 NOTE — RESULT NOTES
Message   Recorded as Task   Date: 07/28/2016 12:10 PM, Created By: Sarah Pleitez   Task Name: Verify Patient Results   Assigned To:  Bernie Wyman   Regarding Patient: Matthieu Jones, Status: Active   Comment:    Bernie Wyman - 28 Jul 2016 12:10 PM        Bernie Wyman - 28 Jul 2016 2:22 PM     TASK EDITED  Card sent that pap was normal        Signatures   Electronically signed by : Lillie Hunter CNM; Jul 28 2016  2:22PM EST                       (Author)

## 2018-01-13 VITALS
OXYGEN SATURATION: 97 % | TEMPERATURE: 98 F | WEIGHT: 137 LBS | SYSTOLIC BLOOD PRESSURE: 106 MMHG | HEART RATE: 73 BPM | BODY MASS INDEX: 25.21 KG/M2 | DIASTOLIC BLOOD PRESSURE: 64 MMHG | HEIGHT: 62 IN | RESPIRATION RATE: 16 BRPM

## 2018-01-13 VITALS
WEIGHT: 131.5 LBS | SYSTOLIC BLOOD PRESSURE: 112 MMHG | HEART RATE: 68 BPM | BODY MASS INDEX: 24.2 KG/M2 | DIASTOLIC BLOOD PRESSURE: 66 MMHG | HEIGHT: 62 IN | TEMPERATURE: 98.3 F | OXYGEN SATURATION: 98 % | RESPIRATION RATE: 16 BRPM

## 2018-01-14 VITALS
BODY MASS INDEX: 24.15 KG/M2 | HEIGHT: 62 IN | HEART RATE: 70 BPM | OXYGEN SATURATION: 97 % | RESPIRATION RATE: 16 BRPM | WEIGHT: 131.25 LBS | DIASTOLIC BLOOD PRESSURE: 60 MMHG | SYSTOLIC BLOOD PRESSURE: 102 MMHG | TEMPERATURE: 97.9 F

## 2018-01-14 VITALS
SYSTOLIC BLOOD PRESSURE: 110 MMHG | WEIGHT: 130 LBS | HEART RATE: 88 BPM | DIASTOLIC BLOOD PRESSURE: 70 MMHG | RESPIRATION RATE: 16 BRPM | TEMPERATURE: 97.5 F | HEIGHT: 62 IN | BODY MASS INDEX: 23.92 KG/M2

## 2018-01-14 VITALS
SYSTOLIC BLOOD PRESSURE: 104 MMHG | DIASTOLIC BLOOD PRESSURE: 58 MMHG | HEIGHT: 62 IN | WEIGHT: 134 LBS | HEART RATE: 68 BPM | BODY MASS INDEX: 24.66 KG/M2

## 2018-01-14 VITALS
RESPIRATION RATE: 16 BRPM | HEART RATE: 70 BPM | BODY MASS INDEX: 24.11 KG/M2 | SYSTOLIC BLOOD PRESSURE: 110 MMHG | WEIGHT: 131 LBS | TEMPERATURE: 98.4 F | DIASTOLIC BLOOD PRESSURE: 60 MMHG | HEIGHT: 62 IN

## 2018-01-16 NOTE — MISCELLANEOUS
Message  left message that her a1c is improving but not perfect yet  will increae metformen to 850mg bid  angela gutierrez in 1 month      Plan  Acute bronchitis    · LevoFLOXacin 500 MG Oral Tablet (Levaquin); Take 1 tablet daily  DM type 2 (diabetes mellitus, type 2)    · MetFORMIN HCl - 850 MG Oral Tablet; TAKE 1 TABLET TWICE DAILY,  WITH  MORNING AND EVENING MEAL   · (1) BASIC METABOLIC PROFILE; Status:Active;  Requested for:10Mar2017;     Signatures   Electronically signed by : Kallie Mortimer, DO; Mar 10 2017 11:42AM EST                       (Author)

## 2018-01-17 NOTE — PROGRESS NOTES
History of Present Illness  Care Coordination Encounter Information:   Type of Encounter: Telephonic   Contact: Initial Contact    Spoke to Patient  Care Coordination SL Nurse Giancarlo Ramirez:   The reason for call is to discuss outreach for follow up/needed services  Referred by PCP  History of uncontrolled diabetes  Checks blood sugar three times daily and states ranges between 140-160  Discussed signs and symptoms of hypo and hyperglycemia, which patient denies  Appetite good  Discussed diabetic diet  Discussed diabetic management classes, which patient refusing at this time  Exercising daily by riding bicycle  Was a no show to last two PCP appointments  Discussed importance of regular follow up appointments in the management of her conditions  Encouraged to schedule and keep appt with PCP  Agreeable  All prescriptions filled  Has diabetic supplies  Transferred to office staff to schedule  Active Problems    1  Colon polyps (211 3) (K63 5)   2  Dyslipidemia (272 4) (E78 5)   3  Encounter for gynecological examination without abnormal finding (V72 31) (Z01 419)   4  Encounter for routine gynecological examination with Papanicolaou smear of cervix   (V72 31,V76 2) (Z01 419)   5  Encounter for screening mammogram for breast cancer (V76 12) (Z12 31)   6  Encounter for screening mammogram for malignant neoplasm of breast (V76 12)   (Z12 31)   7  GERD (gastroesophageal reflux disease) (530 81) (K21 9)   8  History of Lyme disease (V12 09) (Z86 19)   9  Lightheadedness (780 4) (R42)   10  Postoperative state (V45 89) (Z98 890)   11  Screening for breast cancer (V76 10) (Z12 31)   12  Screening for cervical cancer (V76 2) (Z12 4)   13  Screening for HPV (human papillomavirus) (V73 81) (Z11 51)   14  Shortness of breath (786 05) (R06 02)   15  Symptomatic hypotension (458 0) (I95 89)   16  Uncontrolled type 2 diabetes mellitus, without long-term current use of insulin (250 02)    (E11 65)   17   Ventral hernia with obstruction and without gangrene (552 20) (K43 6)    Past Medical History    1  History of Abdominal bloating (787 3) (R14 0)   2  History of Abdominal fullness (789 9) (R19 8)   3  History of Arthritis (716 90) (M19 90)   4  History of Benign paroxysmal positional vertigo due to bilateral vestibular disorder   (386 11) (H81 13)   5  History of Chest pain, cardiac (786 50) (R07 9)   6  History of Dry eye syndrome of bilateral lacrimal glands (375 15) (H04 123)   7  History of acute bronchitis (V12 69) (Z87 09)   8  History of fatigue (V13 89) (Z87 898)   9  History of herpes zoster (V12 09) (Z86 19)   10  History of Lyme disease (V12 09) (Z86 19)   11  History of osteoarthritis (V13 4) (Z87 39)   12  History of rheumatoid arthritis (V13 4) (Z87 39)   13  History of sebaceous cyst (V13 3) (Z87 2)   14  History of vertigo (V12 49) (Z87 898)   15  History of Muscle weakness (generalized) (728 87) (M62 81)   16  History of Rectal bleeding (569 3) (K62 5)   17  History of Screen for colon cancer (V76 51) (Z12 11)   18  History of Tachycardia (785 0) (R00 0)    Surgical History    1  History of Surgery Excision Lipoma   2  History of Tubal Ligation    Family History  Mother    1  Family history of Emphysema/COPD  Father    2  Family history of Emphysema/COPD   3  Family history of Myocardial infarct  Paternal Grandmother    3  Family history of Diabetes  Paternal Aunt    11  Family history of Breast cancer    Social History    · Daily Coffee Consumption (___ Cups/Day)   · Daily Tea Consumption (___ Cups/Day)   · Former smoker (V15 82) (Z87 891)   ·    · Stopped Drinking Alcohol   · Uses Safety Equipment - Seatbelts    Current Meds    1  Omeprazole 20 MG Oral Capsule Delayed Release; TAKE 1 CAPSULE Daily after   supper; Therapy: 21YON8205 to (XBPTGEKY:72KED7184)  Requested for: 56GRH2514; Last   Rx:26Jan2017 Ordered    2  B-12 250 MCG Oral Tablet; Therapy: (Recorded:26Oct2015) to Recorded   3   [employer] Stool Softener 100 MG Oral Capsule; TAKE 1 CAPSULE DAILY; Therapy: 08BVT2616 to Recorded   4  Vitamin C 500 MG Oral Tablet; Therapy: (Recorded:26Oct2015) to Recorded    5  Omeprazole 20 MG Oral Capsule Delayed Release; TAKE ONE CAPSULE BY MOUTH   EVERY DAY; Therapy: 38CLQ9980 to (Last TW:37FLY9951)  Requested for: 98VNC4653 Ordered    6  Atorvastatin Calcium 10 MG Oral Tablet (Lipitor); TAKE 1 TABLET DAILY; Therapy: 12Pnk4265 to (Renew:80Frc5249)  Requested for: 04PTH1351; Last   Rx:11Yfy9254 Ordered    7  MetFORMIN HCl - 850 MG Oral Tablet; TAKE ONE TABLET BY MOUTH TWICE A DAY,   WITH MORNING AND EVENING MEAL; Therapy: 09YUB6237 to (Evaluate:24Nov2017)  Requested for: 17Hhn4969; Last   Rx:91Bxy1149 Ordered    Allergies    1  Penicillins    End of Encounter Meds    1  Omeprazole 20 MG Oral Capsule Delayed Release; TAKE 1 CAPSULE Daily after   supper; Therapy: 80UUZ3765 to (MVHPEXOF:03FEE6547)  Requested for: 00VQM2976; Last   Rx:26Jan2017 Ordered    2  B-12 250 MCG Oral Tablet; Therapy: (Recorded:26Oct2015) to Recorded   3  [employer] Stool Softener 100 MG Oral Capsule; TAKE 1 CAPSULE DAILY; Therapy: 43OUE4635 to Recorded   4  Vitamin C 500 MG Oral Tablet; Therapy: (Recorded:26Oct2015) to Recorded    5  Omeprazole 20 MG Oral Capsule Delayed Release; TAKE ONE CAPSULE BY MOUTH   EVERY DAY; Therapy: 71UEW9501 to (Last WE:92NPH5425)  Requested for: 97ZKX5101 Ordered    6  Atorvastatin Calcium 10 MG Oral Tablet (Lipitor); TAKE 1 TABLET DAILY; Therapy: 85Xck3905 to (Renew:61Sfx0413)  Requested for: 87BWM3386; Last   Rx:89Loq3636 Ordered    7  MetFORMIN HCl - 850 MG Oral Tablet; TAKE ONE TABLET BY MOUTH TWICE A DAY,   WITH MORNING AND EVENING MEAL;    Therapy: 33TSJ4498 to (Luetta Homans)  Requested for: 63Zoe6470; Last   Rx:92Urk2971 Ordered    Future Appointments    Date/Time Provider Specialty Site   08/28/2017 09:30 AM Mahsa Jewel, DO Internal Medicine [facility]     Patient Care Team    Care Team Member Role Specialty Office Number   Alo Gabriel MD Specialist Cardiology [phone]   [facility] [place] [facility]  Family Medicine [phone]     Signatures   Electronically signed by : Wilbur Roberson; Aug 24 2017 10:18AM EST                       (Author)

## 2018-01-18 NOTE — RESULT NOTES
Verified Results  * US ABDOMEN COMPLETE 12Jan2017 11:30AM Semaj Martins Order Number: [id number]    - Patient Instructions: To schedule this appointment, please contact Central Scheduling at [phone]  Test Name Result Flag Reference   US ABDOMEN COMPLETE (Report)     ABDOMEN ULTRASOUND, COMPLETE     INDICATION: Abdominal pain, bloating  COMPARISON: None     TECHNIQUE:  Real-time ultrasound of the abdomen was performed with a curvilinear transducer with both volumetric sweeps and still imaging techniques  FINDINGS:     PANCREAS: Partially obscured by overlying bowel gas  Visualized portions of the pancreas are within normal limits  AORTA AND IVC: Visualized portions are normal for patient age  LIVER:   Size: Mildly enlarged  The liver measures 17 4 cm in the midclavicular line  Contour: Surface contour is smooth  Parenchyma: Increased hepatic echogenicity compatible with fatty infiltration versus hepatic parenchymal disease of other etiology  No evidence of suspicious mass  The main portal vein is patent and hepatopetal       BILIARY:   The gallbladder is normal in caliber  No wall thickening or pericholecystic fluid  No stones or sludge identified  Sonographic Joelyn Curry sign is negative  No intrahepatic biliary dilatation  CBD measures 4 mm  No choledocholithiasis  KIDNEY:    Right kidney measures 10 5 x 3 5 cm  Within normal limits  Left kidney measures 10 6 x 4 6 cm  Within normal limits  SPLEEN:    Measures 9 6 cm  Within normal limits  ASCITES: None  IMPRESSION:     Hepatomegaly with increased hepatic echogenicity suggesting fatty infiltration versus hepatic parenchymal disease of other etiology  No focal mass seen  Limited evaluation of the pancreas  Otherwise, unremarkable abdominal ultrasound         Workstation performed: [id number]     Signed by:   Zhang Delgado,    1/13/17

## 2018-01-22 VITALS
DIASTOLIC BLOOD PRESSURE: 74 MMHG | WEIGHT: 135 LBS | HEART RATE: 63 BPM | TEMPERATURE: 97.2 F | BODY MASS INDEX: 24.84 KG/M2 | HEIGHT: 62 IN | RESPIRATION RATE: 16 BRPM | SYSTOLIC BLOOD PRESSURE: 136 MMHG | OXYGEN SATURATION: 97 %

## 2018-02-28 ENCOUNTER — OFFICE VISIT (OUTPATIENT)
Dept: FAMILY MEDICINE CLINIC | Facility: CLINIC | Age: 53
End: 2018-02-28
Payer: COMMERCIAL

## 2018-02-28 VITALS
DIASTOLIC BLOOD PRESSURE: 76 MMHG | HEIGHT: 63 IN | BODY MASS INDEX: 23.78 KG/M2 | HEART RATE: 78 BPM | TEMPERATURE: 96.5 F | SYSTOLIC BLOOD PRESSURE: 122 MMHG | RESPIRATION RATE: 16 BRPM | WEIGHT: 134.2 LBS | OXYGEN SATURATION: 98 %

## 2018-02-28 DIAGNOSIS — E11.9 TYPE 2 DIABETES MELLITUS WITHOUT COMPLICATION, WITHOUT LONG-TERM CURRENT USE OF INSULIN (HCC): Primary | ICD-10-CM

## 2018-02-28 DIAGNOSIS — E78.2 MIXED HYPERLIPIDEMIA: ICD-10-CM

## 2018-02-28 DIAGNOSIS — Z12.39 SCREENING FOR MALIGNANT NEOPLASM OF BREAST: ICD-10-CM

## 2018-02-28 DIAGNOSIS — I10 ESSENTIAL HYPERTENSION: ICD-10-CM

## 2018-02-28 DIAGNOSIS — IMO0001 UNCONTROLLED TYPE 2 DIABETES MELLITUS WITHOUT COMPLICATION, WITHOUT LONG-TERM CURRENT USE OF INSULIN: ICD-10-CM

## 2018-02-28 DIAGNOSIS — K58.2 IRRITABLE BOWEL SYNDROME WITH BOTH CONSTIPATION AND DIARRHEA: ICD-10-CM

## 2018-02-28 DIAGNOSIS — K21.9 GASTROESOPHAGEAL REFLUX DISEASE WITHOUT ESOPHAGITIS: ICD-10-CM

## 2018-02-28 PROBLEM — E78.5 DYSLIPIDEMIA: Status: ACTIVE | Noted: 2017-04-24

## 2018-02-28 LAB — SL AMB POCT HEMOGLOBIN AIC: 8.8

## 2018-02-28 PROCEDURE — 3078F DIAST BP <80 MM HG: CPT | Performed by: FAMILY MEDICINE

## 2018-02-28 PROCEDURE — 4010F ACE/ARB THERAPY RXD/TAKEN: CPT | Performed by: FAMILY MEDICINE

## 2018-02-28 PROCEDURE — 83036 HEMOGLOBIN GLYCOSYLATED A1C: CPT | Performed by: FAMILY MEDICINE

## 2018-02-28 PROCEDURE — 99214 OFFICE O/P EST MOD 30 MIN: CPT | Performed by: FAMILY MEDICINE

## 2018-02-28 PROCEDURE — 3074F SYST BP LT 130 MM HG: CPT | Performed by: FAMILY MEDICINE

## 2018-02-28 RX ORDER — LISINOPRIL 5 MG/1
5 TABLET ORAL DAILY
Qty: 30 TABLET | Refills: 5 | Status: SHIPPED | OUTPATIENT
Start: 2018-02-28 | End: 2019-06-26 | Stop reason: HOSPADM

## 2018-02-28 RX ORDER — GLIMEPIRIDE 1 MG/1
1 TABLET ORAL
Qty: 30 TABLET | Refills: 5 | Status: SHIPPED | OUTPATIENT
Start: 2018-02-28 | End: 2019-02-26

## 2018-02-28 RX ORDER — DICYCLOMINE HCL 20 MG
20 TABLET ORAL 3 TIMES DAILY PRN
Qty: 90 TABLET | Refills: 5 | Status: SHIPPED | OUTPATIENT
Start: 2018-02-28 | End: 2019-02-26

## 2018-02-28 RX ORDER — OMEPRAZOLE 20 MG/1
20 CAPSULE, DELAYED RELEASE ORAL DAILY
Qty: 90 CAPSULE | Refills: 1 | OUTPATIENT
Start: 2018-02-28

## 2018-02-28 RX ORDER — OMEPRAZOLE 20 MG/1
20 CAPSULE, DELAYED RELEASE ORAL DAILY
Qty: 30 CAPSULE | Refills: 5 | Status: SHIPPED | OUTPATIENT
Start: 2018-02-28 | End: 2019-02-26

## 2018-02-28 RX ORDER — ATORVASTATIN CALCIUM 10 MG/1
10 TABLET, FILM COATED ORAL DAILY
Qty: 30 TABLET | Refills: 5 | Status: SHIPPED | OUTPATIENT
Start: 2018-02-28 | End: 2020-04-17 | Stop reason: SDUPTHER

## 2018-02-28 NOTE — PROGRESS NOTES
Assessment/Plan:         Diagnoses and all orders for this visit:    Type 2 diabetes mellitus without complication, without long-term current use of insulin ([facility])  Comments:  check A1C, continue metformin 850mg bid, needs eye exam  Orders:  -     POCT hemoglobin A1c    Gastroesophageal reflux disease without esophagitis  Comments:  continue PPI    Essential hypertension  Comments:  continue lisinopril    Irritable bowel syndrome with both constipation and diarrhea    Mixed hyperlipidemia  Comments:  continue lipitor    Uncontrolled type 2 diabetes mellitus without complication, without long-term current use of insulin ([facility])    Screening for malignant neoplasm of breast    Other orders  -      Colonoscopy          Subjective:      Patient ID: Elyse Silverio is a 47 y o  female  HPI    The following portions of the patient's history were reviewed and updated as appropriate: allergies, current medications, past family history, past medical history, past social history, past surgical history and problem list     Review of Systems      Objective:      /76 (BP Location: Right arm, Patient Position: Sitting, Cuff Size: Standard)   Pulse 78   Temp (!) 96 5 °F (35 8 °C)   Resp 16   Ht 5' 3" (1 6 m)   Wt 60 9 kg (134 lb 3 2 oz)   SpO2 98%   BMI 23 77 kg/m²          Physical Exam   Constitutional: She is oriented to person, place, and time  She appears well-developed and well-nourished  Cardiovascular: Normal rate, regular rhythm and normal heart sounds  Pulmonary/Chest: Effort normal and breath sounds normal    Neurological: She is alert and oriented to person, place, and time  Skin: Skin is warm  Psychiatric: She has a normal mood and affect   Her behavior is normal  Judgment and thought content normal

## 2018-05-22 DIAGNOSIS — E11.9 TYPE 2 DIABETES MELLITUS WITHOUT COMPLICATION, WITHOUT LONG-TERM CURRENT USE OF INSULIN (HCC): ICD-10-CM

## 2018-07-25 ENCOUNTER — APPOINTMENT (EMERGENCY)
Dept: CT IMAGING | Facility: HOSPITAL | Age: 53
End: 2018-07-25
Payer: COMMERCIAL

## 2018-07-25 ENCOUNTER — HOSPITAL ENCOUNTER (EMERGENCY)
Facility: HOSPITAL | Age: 53
Discharge: HOME | End: 2018-07-25
Attending: EMERGENCY MEDICINE | Admitting: EMERGENCY MEDICINE
Payer: COMMERCIAL

## 2018-07-25 VITALS
WEIGHT: 131.17 LBS | HEART RATE: 83 BPM | DIASTOLIC BLOOD PRESSURE: 63 MMHG | TEMPERATURE: 98.5 F | OXYGEN SATURATION: 96 % | BODY MASS INDEX: 23.24 KG/M2 | RESPIRATION RATE: 18 BRPM | SYSTOLIC BLOOD PRESSURE: 120 MMHG

## 2018-07-25 DIAGNOSIS — K52.9 ENTERITIS: ICD-10-CM

## 2018-07-25 DIAGNOSIS — K43.6 VENTRAL HERNIA WITH OBSTRUCTION AND WITHOUT GANGRENE: Primary | ICD-10-CM

## 2018-07-25 DIAGNOSIS — R10.9 ABDOMINAL PAIN: ICD-10-CM

## 2018-07-25 DIAGNOSIS — R18.8 INTRA-ABDOMINAL FLUID COLLECTION: ICD-10-CM

## 2018-07-25 LAB
ALBUMIN SERPL BCP-MCNC: 3.3 G/DL (ref 3.5–5)
ALP SERPL-CCNC: 105 U/L (ref 46–116)
ALT SERPL W P-5'-P-CCNC: 18 U/L (ref 12–78)
ANION GAP SERPL CALCULATED.3IONS-SCNC: 9 MMOL/L (ref 4–13)
AST SERPL W P-5'-P-CCNC: 8 U/L (ref 5–45)
BACTERIA UR QL AUTO: ABNORMAL /HPF
BASOPHILS # BLD AUTO: 0.02 THOUSANDS/ΜL (ref 0–0.1)
BASOPHILS NFR BLD AUTO: 0 % (ref 0–1)
BILIRUB SERPL-MCNC: 0.5 MG/DL (ref 0.2–1)
BILIRUB UR QL STRIP: NEGATIVE
BUN SERPL-MCNC: 14 MG/DL (ref 5–25)
CALCIUM SERPL-MCNC: 9.3 MG/DL (ref 8.3–10.1)
CHLORIDE SERPL-SCNC: 101 MMOL/L (ref 100–108)
CLARITY UR: CLEAR
CO2 SERPL-SCNC: 27 MMOL/L (ref 21–32)
COLOR UR: YELLOW
CREAT SERPL-MCNC: 0.8 MG/DL (ref 0.6–1.3)
EOSINOPHIL # BLD AUTO: 0.07 THOUSAND/ΜL (ref 0–0.61)
EOSINOPHIL NFR BLD AUTO: 1 % (ref 0–6)
ERYTHROCYTE [DISTWIDTH] IN BLOOD BY AUTOMATED COUNT: 11.6 % (ref 11.6–15.1)
EXT PREG TEST URINE: NEGATIVE
GFR SERPL CREATININE-BSD FRML MDRD: 85 ML/MIN/1.73SQ M
GLUCOSE SERPL-MCNC: 279 MG/DL (ref 65–140)
GLUCOSE UR STRIP-MCNC: ABNORMAL MG/DL
HCT VFR BLD AUTO: 40.8 % (ref 34.8–46.1)
HGB BLD-MCNC: 13.7 G/DL (ref 11.5–15.4)
HGB UR QL STRIP.AUTO: NEGATIVE
IMM GRANULOCYTES # BLD AUTO: 0.01 THOUSAND/UL (ref 0–0.2)
IMM GRANULOCYTES NFR BLD AUTO: 0 % (ref 0–2)
KETONES UR STRIP-MCNC: NEGATIVE MG/DL
LEUKOCYTE ESTERASE UR QL STRIP: ABNORMAL
LIPASE SERPL-CCNC: 138 U/L (ref 73–393)
LYMPHOCYTES # BLD AUTO: 2.01 THOUSANDS/ΜL (ref 0.6–4.47)
LYMPHOCYTES NFR BLD AUTO: 31 % (ref 14–44)
MCH RBC QN AUTO: 28.9 PG (ref 26.8–34.3)
MCHC RBC AUTO-ENTMCNC: 33.6 G/DL (ref 31.4–37.4)
MCV RBC AUTO: 86 FL (ref 82–98)
MONOCYTES # BLD AUTO: 0.5 THOUSAND/ΜL (ref 0.17–1.22)
MONOCYTES NFR BLD AUTO: 8 % (ref 4–12)
MUCOUS THREADS UR QL AUTO: ABNORMAL
NEUTROPHILS # BLD AUTO: 3.84 THOUSANDS/ΜL (ref 1.85–7.62)
NEUTS SEG NFR BLD AUTO: 60 % (ref 43–75)
NITRITE UR QL STRIP: NEGATIVE
NON-SQ EPI CELLS URNS QL MICRO: ABNORMAL /HPF
NRBC BLD AUTO-RTO: 0 /100 WBCS
PH UR STRIP.AUTO: 6 [PH] (ref 4.5–8)
PLATELET # BLD AUTO: 174 THOUSANDS/UL (ref 149–390)
PMV BLD AUTO: 10.2 FL (ref 8.9–12.7)
POTASSIUM SERPL-SCNC: 4 MMOL/L (ref 3.5–5.3)
PROT SERPL-MCNC: 7.4 G/DL (ref 6.4–8.2)
PROT UR STRIP-MCNC: NEGATIVE MG/DL
RBC # BLD AUTO: 4.74 MILLION/UL (ref 3.81–5.12)
RBC #/AREA URNS AUTO: ABNORMAL /HPF
SODIUM SERPL-SCNC: 137 MMOL/L (ref 136–145)
SP GR UR STRIP.AUTO: 1.01 (ref 1–1.03)
UROBILINOGEN UR QL STRIP.AUTO: 0.2 E.U./DL
WBC # BLD AUTO: 6.45 THOUSAND/UL (ref 4.31–10.16)
WBC #/AREA URNS AUTO: ABNORMAL /HPF

## 2018-07-25 PROCEDURE — 80053 COMPREHEN METABOLIC PANEL: CPT

## 2018-07-25 PROCEDURE — 81025 URINE PREGNANCY TEST: CPT

## 2018-07-25 PROCEDURE — 96374 THER/PROPH/DIAG INJ IV PUSH: CPT

## 2018-07-25 PROCEDURE — 74177 CT ABD & PELVIS W/CONTRAST: CPT

## 2018-07-25 PROCEDURE — 96361 HYDRATE IV INFUSION ADD-ON: CPT

## 2018-07-25 PROCEDURE — 36415 COLL VENOUS BLD VENIPUNCTURE: CPT

## 2018-07-25 PROCEDURE — 81001 URINALYSIS AUTO W/SCOPE: CPT

## 2018-07-25 PROCEDURE — 99284 EMERGENCY DEPT VISIT MOD MDM: CPT

## 2018-07-25 PROCEDURE — 83690 ASSAY OF LIPASE: CPT

## 2018-07-25 PROCEDURE — 85025 COMPLETE CBC W/AUTO DIFF WBC: CPT

## 2018-07-25 PROCEDURE — 99245 OFF/OP CONSLTJ NEW/EST HI 55: CPT | Performed by: PHYSICIAN ASSISTANT

## 2018-07-25 RX ORDER — OXYCODONE HYDROCHLORIDE AND ACETAMINOPHEN 5; 325 MG/1; MG/1
1 TABLET ORAL EVERY 4 HOURS PRN
Qty: 12 TABLET | Refills: 0 | Status: SHIPPED | OUTPATIENT
Start: 2018-07-25 | End: 2018-08-04

## 2018-07-25 RX ORDER — MORPHINE SULFATE 4 MG/ML
4 INJECTION, SOLUTION INTRAMUSCULAR; INTRAVENOUS ONCE
Status: COMPLETED | OUTPATIENT
Start: 2018-07-25 | End: 2018-07-25

## 2018-07-25 RX ADMIN — IOHEXOL 100 ML: 350 INJECTION, SOLUTION INTRAVENOUS at 13:25

## 2018-07-25 RX ADMIN — MORPHINE SULFATE 4 MG: 4 INJECTION INTRAVENOUS at 15:18

## 2018-07-25 RX ADMIN — SODIUM CHLORIDE 1000 ML: 0.9 INJECTION, SOLUTION INTRAVENOUS at 12:47

## 2018-07-25 NOTE — CONSULTS
H&P Exam - General Surgery   Manju Cuellar 47 y o  female MRN: [id number]  Unit/Bed#: ED 13 Encounter: [id number]    Assessment/Plan     Assessment:  1  Manju Cuellar is a 47 y o  female presenting with abdominal pain  Discussed patient with Dr Malik Inman who reviewed the images directly  Fluid collection seen on imaging appear to be more likely a seroma vs an abscess given patient is afebrile and has a normal WBC despite symptoms starting 6 days ago  Plan:  1  OK to discharge patient  Will have patient call and schedule an appointment with Dr Malik Inman for tomorrow to be evaluated in his clinic  Discussed this with the ED attending who is agreeable to this plan  History of Present Illness     HPI:  Manju Cuellar is a 47 y o  female who presents with acute abdominal pain and a bloating sensation  She states it started Friday and has persisted since onset which prompted her to go to the ED  CT of her abdomen and pelvis showed thickened, edematous small bowel loops with an interloop fluid collection measurign 6 1 x 1 7 cm underlying a ventral hernia mesh from a previous repair  Given findings, General surgery was consulted for evaluation  Currently the patient states the pain has improved with administration of pain medication but still has discomfort when pressing on her abdomen and when she goes to the bathroom  She denies any associated fevers, chills, nausea, vomiting, or skin changes  She states she continues to feel bloated but also noted that this problem is chronic and she has dealt with bloating symptoms over the last several years  Vitals were stable in the ED and the patient has been shown to be afebrile  WBC was normal, no leukocytosis despite 6 days of abdominal pain  Her last bowel movement was earlier this morning  She has no other complaints  Review of Systems   Constitutional: Negative for activity change, appetite change, fatigue, fever and unexpected weight change     HENT: Negative for congestion, tinnitus and voice change  Eyes: Negative for photophobia, discharge and visual disturbance  Respiratory: Negative for apnea, chest tightness, shortness of breath, wheezing and stridor  Cardiovascular: Negative for chest pain and leg swelling  Gastrointestinal: Positive for abdominal pain  Negative for abdominal distention, anal bleeding, blood in stool, constipation, diarrhea, nausea and rectal pain  Endocrine: Negative for cold intolerance, polydipsia, polyphagia and polyuria  Genitourinary: Negative for decreased urine volume, difficulty urinating, dysuria, flank pain, hematuria, pelvic pain and urgency  Musculoskeletal: Negative for back pain, neck pain and neck stiffness  Skin: Negative for color change, pallor, rash and wound  Neurological: Negative for dizziness, tremors, syncope and weakness  Historical Information   Past Medical History:   Diagnosis Date    Arthritis     Colonic polyp     resolved: 08/25/2017    Diabetes mellitus ([place] [address] )     Dry eye syndrome of bilateral lacrimal glands     last assessed: 05/15/2015    GERD (gastroesophageal reflux disease)     History of screening mammography     last assessed: 07/25/2016    Hypertension     Lyme disease     Osteoarthritis     last assessed: 07/25/2016    RA (rheumatoid arthritis) ([place] [address] )     Rheumatoid arteritis     Rheumatoid arthritis (HCC)     osteoarthritis    Stroke ([place] [address] )     Symptomatic hypotension     last assessed: 03/22/2017    Ventral hernia with obstruction and without gangrene     last assessed: 02/16/2017    Vertigo      Past Surgical History:   Procedure Laterality Date    BACK SURGERY      CYST REMOVAL      EGD AND COLONOSCOPY N/A 2/3/2017    Procedure: COLONOSCOPY; POSSIBLE POLYPECTOMY; POSSIBLE BIOPSY AND EGD ;  Surgeon: Wyline Schilder, MD;  Location: [facility];   Service:     HERNIA REPAIR      LIPOMA RESECTION      OTHER SURGICAL HISTORY      excision of sebaceous cst to back    TUBAL LIGATION      VENTRAL HERNIA REPAIR N/A 3/17/2017    Procedure: LAPAROSCOPIC VENTRAL HERNIA REPAIR WITH MESH ;  Surgeon: Annita Browne MD;  Location: [facility];  Service:      Social History   History   Alcohol Use No     Comment: Stopped drinking alcohol      History   Drug Use No     History   Smoking Status    Former Smoker    Quit date: 1990   Smokeless Tobacco    Never Used     Family History:   Family History   Problem Relation Age of Onset    COPD Mother         COPD/Emphysema    Diabetes Mother     Heart disease Father     Stroke Father     COPD Father         COPD/Emphysema    Heart attack Father     Diabetes Paternal Grandmother     Breast cancer Paternal [de-identified]     Depression Daughter        Meds/Allergies   PTA meds:   Prior to Admission Medications   Prescriptions Last Dose Informant Patient Reported? Taking?    Cyanocobalamin (B-12 PO)  Self Yes No   Sig: Take 1 tablet by mouth     ascorbic acid (VITAMIN C) 500 mg tablet  Self Yes No   Sig: Take 500 mg by mouth daily   aspirin 325 mg tablet  Self Yes No   Sig: Take 325 mg by mouth daily   atorvastatin (LIPITOR) 10 mg tablet   No No   Sig: Take 1 tablet (10 mg total) by mouth daily   calcium carbonate (TUMS) 500 mg chewable tablet  Self Yes No   Sig: Chew 1 tablet daily   dicyclomine (BENTYL) 20 mg tablet   No No   Sig: Take 1 tablet (20 mg total) by mouth 3 (three) times a day as needed (cramping or abdominal pain) for up to 10 days   dimenhyDRINATE (DRAMAMINE) 50 mg tablet  Self Yes No   Sig: Take 50 mg by mouth 2 (two) times a day     docusate sodium (COLACE) 100 mg capsule  Self Yes No   Sig: Take 100 mg by mouth 2 (two) times a day as needed     famotidine (PEPCID) 20 mg tablet  Self Yes No   Sig: Take 20 mg by mouth 2 (two) times a day   glimepiride (AMARYL) 1 mg tablet   No No   Sig: Take 1 tablet (1 mg total) by mouth daily with breakfast   ibuprofen (MOTRIN) 600 mg tablet  Self No No   Sig: Take 1 tablet by mouth every 6 (six) hours as needed for fever for up to 12 doses   lisinopril (ZESTRIL) 5 mg tablet   No No   Sig: Take 1 tablet (5 mg total) by mouth daily   metFORMIN (GLUCOPHAGE) 850 mg tablet   No No   Sig: Take 1 tablet (850 mg total) by mouth 2 (two) times a day with meals   omeprazole (PriLOSEC) 20 mg delayed release capsule   No No   Sig: Take 1 capsule (20 mg total) by mouth daily      Facility-Administered Medications: None     Allergies   Allergen Reactions    Penicillins        Objective   First Vitals:   Blood Pressure: 137/75 (07/25/18 1148)  Pulse: 79 (07/25/18 1148)  Temperature: 98 5 °F (36 9 °C) (07/25/18 1148)  Temp Source: Oral (07/25/18 1148)  Respirations: 20 (07/25/18 1148)  Weight - Scale: 59 5 kg (131 lb 2 8 oz) (07/25/18 1148)  SpO2: 95 % (07/25/18 1148)    Current Vitals:   Blood Pressure: 120/63 (07/25/18 1600)  Pulse: 83 (07/25/18 1600)  Temperature: 98 5 °F (36 9 °C) (07/25/18 1148)  Temp Source: Oral (07/25/18 1148)  Respirations: 18 (07/25/18 1600)  Weight - Scale: 59 5 kg (131 lb 2 8 oz) (07/25/18 1148)  SpO2: 96 % (07/25/18 1600)      Intake/Output Summary (Last 24 hours) at 07/25/18 1605  Last data filed at 07/25/18 1453   Gross per 24 hour   Intake             1000 ml   Output                0 ml   Net             1000 ml       Invasive Devices     Peripheral Intravenous Line            Peripheral IV 07/25/18 Right Antecubital less than 1 day                Physical Exam   Constitutional: She is oriented to person, place, and time  She appears well-developed and well-nourished  No distress  HENT:   Head: Normocephalic and atraumatic  Right Ear: External ear normal    Left Ear: External ear normal    Mouth/Throat: Oropharynx is clear and moist  No oropharyngeal exudate  Eyes: Conjunctivae and EOM are normal  Pupils are equal, round, and reactive to light  Neck: Normal range of motion  Neck supple  No tracheal deviation present  No thyromegaly present     Cardiovascular: Normal rate, regular rhythm, normal heart sounds and intact distal pulses  Exam reveals no gallop and no friction rub  No murmur heard  Pulmonary/Chest: Effort normal and breath sounds normal  No respiratory distress  She has no wheezes  She has no rales  She exhibits no tenderness  Abdominal: Soft  Bowel sounds are normal  She exhibits no distension  There is tenderness  There is no rebound and no guarding  Abdomen is soft and round, non-tympanic to percussion, active bowel sounds, diffuse tenderness to palpation particularly on right side, no guarding or peritoneal signs seen on exam, no erythema or changes in skin on abdomen   Musculoskeletal: Normal range of motion  She exhibits no edema, tenderness or deformity  Neurological: She is oriented to person, place, and time  She has normal reflexes  No cranial nerve deficit  Coordination normal    Skin: Skin is warm and dry  No rash noted  She is not diaphoretic  No erythema  No pallor  Lab Results: I have personally reviewed pertinent lab results      Recent Results (from the past 36 hour(s))   CBC and differential    Collection Time: 07/25/18 12:46 PM   Result Value Ref Range    WBC 6 45 4 31 - 10 16 Thousand/uL    RBC 4 74 3 81 - 5 12 Million/uL    Hemoglobin 13 7 11 5 - 15 4 g/dL    Hematocrit 40 8 34 8 - 46 1 %    MCV 86 82 - 98 fL    MCH 28 9 26 8 - 34 3 pg    MCHC 33 6 31 4 - 37 4 g/dL    RDW 11 6 11 6 - 15 1 %    MPV 10 2 8 9 - 12 7 fL    Platelets 807 042 - 147 Thousands/uL    nRBC 0 /100 WBCs    Neutrophils Relative 60 43 - 75 %    Immat GRANS % 0 0 - 2 %    Lymphocytes Relative 31 14 - 44 %    Monocytes Relative 8 4 - 12 %    Eosinophils Relative 1 0 - 6 %    Basophils Relative 0 0 - 1 %    Neutrophils Absolute 3 84 1 85 - 7 62 Thousands/µL    Immature Grans Absolute 0 01 0 00 - 0 20 Thousand/uL    Lymphocytes Absolute 2 01 0 60 - 4 47 Thousands/µL    Monocytes Absolute 0 50 0 17 - 1 22 Thousand/µL    Eosinophils Absolute 0 07 0 00 - 0 61 Thousand/µL Basophils Absolute 0 02 0 00 - 0 10 Thousands/µL   Comprehensive metabolic panel    Collection Time: 07/25/18 12:46 PM   Result Value Ref Range    Sodium 137 136 - 145 mmol/L    Potassium 4 0 3 5 - 5 3 mmol/L    Chloride 101 100 - 108 mmol/L    CO2 27 21 - 32 mmol/L    Anion Gap 9 4 - 13 mmol/L    BUN 14 5 - 25 mg/dL    Creatinine 0 80 0 60 - 1 30 mg/dL    Glucose 279 (H) 65 - 140 mg/dL    Calcium 9 3 8 3 - 10 1 mg/dL    AST 8 5 - 45 U/L    ALT 18 12 - 78 U/L    Alkaline Phosphatase 105 46 - 116 U/L    Total Protein 7 4 6 4 - 8 2 g/dL    Albumin 3 3 (L) 3 5 - 5 0 g/dL    Total Bilirubin 0 50 0 20 - 1 00 mg/dL    eGFR 85 ml/min/1 73sq m   Lipase    Collection Time: 07/25/18 12:46 PM   Result Value Ref Range    Lipase 138 73 - 393 u/L   UA w Reflex to Microscopic    Collection Time: 07/25/18 12:47 PM   Result Value Ref Range    Color, UA Yellow     Clarity, UA Clear     Specific [place], UA 1 015 1 003 - 1 030    pH, UA 6 0 4 5 - 8 0    Leukocytes, UA (A) Negative     Elevated glucose may cause decreased leukocyte values  See urine microscopic for [facility] result/    Nitrite, UA Negative Negative    Protein, UA Negative Negative mg/dl    Glucose, UA >=1000 (1%) (A) Negative mg/dl    Ketones, UA Negative Negative mg/dl    Urobilinogen, UA 0 2 0 2, 1 0 E U /dl E U /dl    Bilirubin, UA Negative Negative    Blood, UA Negative Negative   Urine Microscopic    Collection Time: 07/25/18 12:47 PM   Result Value Ref Range    RBC, UA 1-2 (A) None Seen, 0-5 /hpf    WBC, UA 4-10 (A) None Seen, 0-5, 5-55, 5-65 /hpf    Epithelial Cells Occasional None Seen, Occasional /hpf    Bacteria, UA None Seen None Seen, Occasional /hpf    MUCOUS THREADS Occasional (A) None Seen   POCT pregnancy, urine    Collection Time: 07/25/18 12:54 PM   Result Value Ref Range    EXT PREG TEST UR (Ref: Negative) negative      Imaging: I have personally reviewed pertinent reports         Ct Abdomen Pelvis With Contrast    Result Date: 7/25/2018  Impression: Conglomeration of thickened, edematous small bowel loops in the anterior abdomen with associated interloop abscess measuring 6 1 x 1 7 cm abutting the anterior abdominal wall underlying a ventral hernia mesh repair  It is uncertain whether this represents primary infection and abscess of the hernia mesh with secondary involvement of the adjacent small bowel loops, versus a primary infectious or inflammatory enterocolitis     I personally discussed this study with Van Angela on 7/25/2018 at 2:01 PM  Workstation performed: [id number]     EKG, Pathology, and Other Studies: I have personally reviewed pertinent films in PACS

## 2018-07-25 NOTE — DISCHARGE INSTRUCTIONS

## 2018-07-26 ENCOUNTER — OFFICE VISIT (OUTPATIENT)
Dept: SURGERY | Facility: CLINIC | Age: 53
End: 2018-07-26
Payer: COMMERCIAL

## 2018-07-26 VITALS
TEMPERATURE: 98.1 F | RESPIRATION RATE: 12 BRPM | SYSTOLIC BLOOD PRESSURE: 110 MMHG | HEIGHT: 63 IN | HEART RATE: 97 BPM | DIASTOLIC BLOOD PRESSURE: 40 MMHG | WEIGHT: 130.02 LBS | BODY MASS INDEX: 23.04 KG/M2

## 2018-07-26 DIAGNOSIS — R10.33 PERIUMBILICAL ABDOMINAL PAIN: Primary | ICD-10-CM

## 2018-07-26 PROCEDURE — 99214 OFFICE O/P EST MOD 30 MIN: CPT | Performed by: SURGERY

## 2018-07-26 RX ORDER — IBUPROFEN 600 MG/1
600 TABLET ORAL EVERY 8 HOURS SCHEDULED
Qty: 30 TABLET | Refills: 0 | Status: SHIPPED | OUTPATIENT
Start: 2018-07-26 | End: 2019-02-26

## 2018-07-26 NOTE — PROGRESS NOTES
Follow Up - General Surgery   Jie Estrada 47 y o  female MRN: [id number]  Unit/Bed#:  Encounter: [id number]    Assessment/Plan     Assessment:  Sudden onset of abdominal pain for the past 4 days, stable at this time  Diabetes    Plan:  The patient does have symptoms of crampy abdominal pain followed by diarrhea for many years, last colonoscopy did not reveal any evidence of inflammatory bowel disease  The patient will be placed on nonsteroidal and heating pad  She will return to my office in 1 week  History of Present Illness     HPI:  Jie Estrada is a 47 y o  female who presents to my office complaining of sudden onset of abdominal pain mainly on the right side of the abdomen, decreased appetite because of the pain  Denies any nausea, vomiting, chills, fever, diarrhea or constipation  She does not recall any single event the provoked the pain  She was doing well until 4 [place] experiencing the [place]  The patient is status post laparoscopic umbilical hernia repair with mesh over a year ago  The patient went to the emergency room yesterday and she was discharged, I reviewed the CT scan report and films, please see full report below  Review of Systems: The rest of the review of system total of 10 were negative except for the HPI      Historical Information   Past Medical History:   Diagnosis Date    Arthritis     Colonic polyp     resolved: 08/25/2017    Diabetes mellitus ([facility] )     Dry eye syndrome of bilateral lacrimal glands     last assessed: 05/15/2015    GERD (gastroesophageal reflux disease)     History of screening mammography     last assessed: 07/25/2016    Hypertension     Lyme disease     Osteoarthritis     last assessed: 07/25/2016    RA (rheumatoid arthritis) ([facility] )     Rheumatoid arteritis     Rheumatoid arthritis (HCC)     osteoarthritis    Stroke ([facility] )     Symptomatic hypotension     last assessed: 03/22/2017    Ventral hernia with obstruction and without gangrene     last assessed: 02/16/2017    Vertigo      Past Surgical History:   Procedure Laterality Date    BACK SURGERY      CYST REMOVAL      EGD AND COLONOSCOPY N/A 2/3/2017    Procedure: COLONOSCOPY; POSSIBLE POLYPECTOMY; POSSIBLE BIOPSY AND EGD ;  Surgeon: Anand Antunez MD;  Location: [facility];   Service:     HERNIA REPAIR      LIPOMA RESECTION      OTHER SURGICAL HISTORY      excision of sebaceous cst to back    TUBAL LIGATION      VENTRAL HERNIA REPAIR N/A 3/17/2017    Procedure: LAPAROSCOPIC VENTRAL HERNIA REPAIR WITH MESH ;  Surgeon: Anand Antunez MD;  Location: [facility];  Service:      Social History   History   Alcohol Use No     Comment: Stopped drinking alcohol      History   Drug Use No     History   Smoking Status    Former Smoker    Quit date: 1990   Smokeless Tobacco    Never Used     Family History: non-contributory    Meds/Allergies   all medications and allergies reviewed     Current Outpatient Prescriptions:     ascorbic acid (VITAMIN C) 500 mg tablet, Take 500 mg by mouth daily, Disp: , Rfl:     aspirin 325 mg tablet, Take 325 mg by mouth daily, Disp: , Rfl:     atorvastatin (LIPITOR) 10 mg tablet, Take 1 tablet (10 mg total) by mouth daily, Disp: 30 tablet, Rfl: 5    calcium carbonate (TUMS) 500 mg chewable tablet, Chew 1 tablet daily, Disp: , Rfl:     Cyanocobalamin (B-12 PO), Take 1 tablet by mouth  , Disp: , Rfl:     dicyclomine (BENTYL) 20 mg tablet, Take 1 tablet (20 mg total) by mouth 3 (three) times a day as needed (cramping or abdominal pain) for up to 10 days, Disp: 90 tablet, Rfl: 5    dimenhyDRINATE (DRAMAMINE) 50 mg tablet, Take 50 mg by mouth 2 (two) times a day  , Disp: , Rfl:     docusate sodium (COLACE) 100 mg capsule, Take 100 mg by mouth 2 (two) times a day as needed  , Disp: , Rfl:     famotidine (PEPCID) 20 mg tablet, Take 20 mg by mouth 2 (two) times a day, Disp: , Rfl:     glimepiride (AMARYL) 1 mg tablet, Take 1 tablet (1 mg total) by mouth daily with breakfast, Disp: 30 tablet, Rfl: 5    ibuprofen (MOTRIN) 600 mg tablet, Take 1 tablet by mouth every 6 (six) hours as needed for fever for up to 12 doses, Disp: 12 tablet, Rfl: 0    lisinopril (ZESTRIL) 5 mg tablet, Take 1 tablet (5 mg total) by mouth daily, Disp: 30 tablet, Rfl: 5    metFORMIN (GLUCOPHAGE) 850 mg tablet, Take 1 tablet (850 mg total) by mouth 2 (two) times a day with meals, Disp: 60 tablet, Rfl: 0    omeprazole (PriLOSEC) 20 mg delayed release capsule, Take 1 capsule (20 mg total) by mouth daily, Disp: 30 capsule, Rfl: 5    oxyCODONE-acetaminophen (PERCOCET) 5-325 mg per tablet, Take 1 tablet by mouth every 4 (four) hours as needed for moderate pain for up to 10 days Max Daily Amount: 6 tablets, Disp: 12 tablet, Rfl: 0  No current facility-administered medications for this visit     Allergies   Allergen Reactions    Penicillins        Objective     Current Vitals:   Blood Pressure: (!) 110/40 (07/26/18 1043)  Pulse: 97 (07/26/18 1043)  Temperature: 98 1 °F (36 7 °C) (07/26/18 1043)  Temp Source: Oral (07/26/18 1043)  Respirations: 12 (07/26/18 1043)  Height: 5' 3" (160 cm) (07/26/18 1043)  Weight - Scale: 59 kg (130 lb 0 3 oz) (07/26/18 1043)      Invasive Devices     Peripheral Intravenous Line            Peripheral IV 07/25/18 Right Antecubital less than 1 day                Physical Exam      Component      Latest Ref Rng & Units 7/25/2018   WBC      4 31 - 10 16 Thousand/uL 6 45   RBC      3 81 - 5 12 Million/uL 4 74   Hemoglobin      11 5 - 15 4 g/dL 13 7   Hematocrit      34 8 - 46 1 % 40 8   MCV      82 - 98 fL 86   MCH      26 8 - 34 3 pg 28 9   MCHC      31 4 - 37 4 g/dL 33 6   RDW      11 6 - 15 1 % 11 6   MPV      8 9 - 12 7 fL 10 2   Platelets      779 - 390 Thousands/uL 174   nRBC      /100 WBCs 0   Neutrophils Relative      43 - 75 % 60   Immat GRANS %      0 - 2 % 0   Lymphocytes Relative      14 - 44 % 31   Monocytes Relative      4 - 12 % 8   Eosinophils Relative      0 - 6 % 1   Basophils Relative      0 - 1 % 0   Neutrophils Absolute      1 85 - 7 62 Thousands/µL 3 84   Immature Grans Absolute      0 00 - 0 20 Thousand/uL 0 01   Lymphocytes Absolute      0 60 - 4 47 Thousands/µL 2 01   Monocytes Absolute      0 17 - 1 22 Thousand/µL 0 50   Eosinophils Absolute      0 00 - 0 61 Thousand/µL 0 07   Basophils Absolute      0 00 - 0 10 Thousands/µL 0 02     Component      Latest Ref Rng & Units 7/25/2018   Sodium      136 - 145 mmol/L 137   Potassium      3 5 - 5 3 mmol/L 4 0   Chloride      100 - 108 mmol/L 101   CO2      21 - 32 mmol/L 27   Anion Gap      4 - 13 mmol/L 9   BUN      5 - 25 mg/dL 14   Creatinine      0 60 - 1 30 mg/dL 0 80   Glucose      65 - 140 mg/dL 279 (H)   Calcium      8 3 - 10 1 mg/dL 9 3   AST      5 - 45 U/L 8   ALT      12 - 78 U/L 18   Alkaline Phosphatase      46 - 116 U/L 105   Total Protein      6 4 - 8 2 g/dL 7 4   Albumin      3 5 - 5 0 g/dL 3 3 (L)   Total Bilirubin      0 20 - 1 00 mg/dL 0 50   eGFR      ml/min/1 73sq m 85     Component      Latest Ref Rng & Units 7/25/2018   Lipase      73 - 393 u/L 138     Imaging: I have personally reviewed pertinent reports  No results found  EKG, Pathology, and Other Studies: I have personally reviewed pertinent films in PACS   CT ABDOMEN AND PELVIS WITH IV CONTRAST     INDICATION:   Right-sided abdominal pain      COMPARISON:  9/14/2017, 3/1/2017, 1/13/2017      TECHNIQUE:  CT examination of the abdomen and pelvis was performed  Axial, sagittal, and coronal 2D reformatted images were created from the source data and submitted for interpretation      Radiation dose length product (DLP) for this visit:  625 mGy-cm     This examination, like all CT scans performed in the [facility], was performed utilizing techniques to minimize radiation dose exposure, including the use of iterative   reconstruction and automated exposure control      IV Contrast:  100 mL of iohexol (OMNIPAQUE)  Enteric Contrast:  Enteric contrast was not administered      FINDINGS:     ABDOMEN     LOWER CHEST:  Mild bibasilar atelectasis or scarring  No effusions      LIVER/BILIARY TREE:  Unremarkable      GALLBLADDER:  No calcified gallstones  No pericholecystic inflammatory change      SPLEEN:  Unremarkable      PANCREAS:  Unremarkable      ADRENAL GLANDS:  Unremarkable      KIDNEYS/URETERS:  Unremarkable  No hydronephrosis      STOMACH AND BOWEL:  There is a conglomeration of thickened, edematous small bowel loops in the right paramedian anterior abdomen suggestive of enteritis, with associated interloop abscess abutting the anterior abdominal wall in the region of ventral   hernia repair, measuring approximately 6 1 x 1 7 x 2 7 cm  There is pancolonic thickening compatible with colitis  No evidence of intestinal obstruction      APPENDIX:  No findings to suggest appendicitis      ABDOMINOPELVIC CAVITY:  Small free fluid in the pelvis  No adenopathy  No pneumoperitoneum      VESSELS:  Unremarkable for patient's age      PELVIS     REPRODUCTIVE ORGANS:  Unremarkable for patient's age      URINARY BLADDER:  Unremarkable      ABDOMINAL WALL/INGUINAL REGIONS:  Status post ventral hernia repair      OSSEOUS STRUCTURES:  No acute fracture or destructive osseous lesion      IMPRESSION:     Conglomeration of thickened, edematous small bowel loops in the anterior abdomen with associated interloop abscess measuring 6 1 x 1 7 cm abutting the anterior abdominal wall underlying a ventral hernia mesh repair    It is uncertain whether this   represents primary infection and abscess of the hernia mesh with secondary involvement of the adjacent small bowel loops, versus a primary infectious or inflammatory enterocolitis

## 2018-07-30 NOTE — ED PROVIDER NOTES
History  Chief Complaint   Patient presents with    Abdominal Pain     Pt states that for the last 3 days shes had increasing throbbing  pain on the right side of her abdomin  History provided by:  Patient  Abdominal Pain   Pain location:  RLQ and R flank  Pain quality: aching and cramping    Pain radiates to:  Does not radiate  Pain severity:  Moderate  Onset quality:  Gradual  Duration:  4 days  Timing:  Constant  Progression:  Worsening  Chronicity:  New  Context: previous surgery (Pt has h/o having hernia repair surgeries done here by Dr Rowena Parrish)    Context: not medication withdrawal    Relieved by:  Nothing  Worsened by:  Nothing  Ineffective treatments:  None tried  Associated symptoms: anorexia, diarrhea and nausea    Associated symptoms: no chest pain, no chills, no cough, no dysuria, no fatigue, no fever, no hematuria, no shortness of breath, no sore throat and no vomiting    Risk factors: multiple surgeries        Prior to Admission Medications   Prescriptions Last Dose Informant Patient Reported? Taking?    Cyanocobalamin (B-12 PO)  Self Yes No   Sig: Take 1 tablet by mouth     ascorbic acid (VITAMIN C) 500 mg tablet  Self Yes No   Sig: Take 500 mg by mouth daily   aspirin 325 mg tablet  Self Yes No   Sig: Take 325 mg by mouth daily   atorvastatin (LIPITOR) 10 mg tablet   No No   Sig: Take 1 tablet (10 mg total) by mouth daily   calcium carbonate (TUMS) 500 mg chewable tablet  Self Yes No   Sig: Chew 1 tablet daily   dicyclomine (BENTYL) 20 mg tablet   No No   Sig: Take 1 tablet (20 mg total) by mouth 3 (three) times a day as needed (cramping or abdominal pain) for up to 10 days   dimenhyDRINATE (DRAMAMINE) 50 mg tablet  Self Yes No   Sig: Take 50 mg by mouth 2 (two) times a day     docusate sodium (COLACE) 100 mg capsule  Self Yes No   Sig: Take 100 mg by mouth 2 (two) times a day as needed     famotidine (PEPCID) 20 mg tablet  Self Yes No   Sig: Take 20 mg by mouth 2 (two) times a day glimepiride (AMARYL) 1 mg tablet   No No   Sig: Take 1 tablet (1 mg total) by mouth daily with breakfast   ibuprofen (MOTRIN) 600 mg tablet  Self No No   Sig: Take 1 tablet by mouth every 6 (six) hours as needed for fever for up to 12 doses   lisinopril (ZESTRIL) 5 mg tablet   No No   Sig: Take 1 tablet (5 mg total) by mouth daily   metFORMIN (GLUCOPHAGE) 850 mg tablet   No No   Sig: Take 1 tablet (850 mg total) by mouth 2 (two) times a day with meals   omeprazole (PriLOSEC) 20 mg delayed release capsule   No No   Sig: Take 1 capsule (20 mg total) by mouth daily      Facility-Administered Medications: None       Past Medical History:   Diagnosis Date    Arthritis     Colonic polyp     resolved: 08/25/2017    Diabetes mellitus ([facility] )     Dry eye syndrome of bilateral lacrimal glands     last assessed: 05/15/2015    GERD (gastroesophageal reflux disease)     History of screening mammography     last assessed: 07/25/2016    Hypertension     Lyme disease     Osteoarthritis     last assessed: 07/25/2016    [facility]     Rheumatoid arteritis     Rheumatoid arthritis ([facility])     osteoarthritis    Stroke ([facility] )     Symptomatic hypotension     last assessed: 03/22/2017    Ventral hernia with obstruction and without gangrene     last assessed: 02/16/2017    Vertigo        Past Surgical History:   Procedure Laterality Date    BACK SURGERY      CYST REMOVAL      EGD AND COLONOSCOPY N/A 2/3/2017    Procedure: COLONOSCOPY; POSSIBLE POLYPECTOMY; POSSIBLE BIOPSY AND EGD ;  Surgeon: Tomás Ta MD;  Location: [facility];   Service:     HERNIA REPAIR      LIPOMA RESECTION      OTHER SURGICAL HISTORY      excision of sebaceous cst to back    TUBAL LIGATION      VENTRAL HERNIA REPAIR N/A 3/17/2017    Procedure: LAPAROSCOPIC VENTRAL HERNIA REPAIR WITH MESH ;  Surgeon: Tomás Ta MD;  [facility]: [facility];  Service:        Family History   Problem Relation Age of Onset    COPD Mother COPD/Emphysema    Diabetes Mother     Heart disease Father     Stroke Father     COPD Father         COPD/Emphysema    Heart attack Father     Diabetes Paternal Grandmother     Breast cancer Paternal Aunt     Depression Daughter      I have reviewed and agree with the history as documented  Social History   Substance Use Topics    Smoking status: Former Smoker     Quit date: 1990    Smokeless tobacco: Never Used    Alcohol use No      Comment: Stopped drinking alcohol         Review of Systems   Constitutional: Negative for chills, fatigue and fever  HENT: Negative for sore throat  Respiratory: Negative for cough and shortness of breath  Cardiovascular: Negative for chest pain  Gastrointestinal: Positive for abdominal pain, anorexia, diarrhea and nausea  Negative for vomiting  Genitourinary: Negative for dysuria and hematuria  All other systems reviewed and are negative  Physical Exam  Physical Exam   Constitutional: She appears well-developed and well-nourished  HENT:   Head: Normocephalic and atraumatic  Eyes: EOM are normal  Pupils are equal, round, and reactive to light  Cardiovascular: Normal rate and regular rhythm  Pulmonary/Chest: Effort normal and breath sounds normal  No respiratory distress  She has no wheezes  Abdominal: Soft  Bowel sounds are normal  She exhibits distension (mild distention and bloating)  There is tenderness (to right side of abd, numerous healed abd scars)  There is no rebound and no guarding  No hernia  Musculoskeletal: She exhibits no edema  Neurological: She is alert  Skin: Skin is warm  Capillary refill takes less than 2 seconds  No rash noted  She is not diaphoretic  No erythema  Vitals reviewed        Vital Signs  ED Triage Vitals [07/25/18 1148]   Temperature Pulse Respirations Blood Pressure SpO2   98 5 °F (36 9 °C) 79 20 137/75 95 %      Temp Source Heart Rate Source Patient Position - Orthostatic VS BP Location FiO2 (%)   Oral Monitor Lying Right arm --      Pain Score       7           Vitals:    07/25/18 1148 07/25/18 1300 07/25/18 1345 07/25/18 1600   BP: 137/75 108/64 107/62 120/63   Pulse: 79 73 73 83   Patient Position - Orthostatic VS: Lying          Visual Acuity      ED Medications  Medications   sodium chloride 0 9 % bolus 1,000 mL (0 mL Intravenous Stopped 7/25/18 1453)   iohexol (OMNIPAQUE) 350 MG/ML injection (MULTI-DOSE) 100 mL (100 mL Intravenous Given 7/25/18 1325)   morphine (PF) 4 mg/mL injection 4 mg (4 mg Intravenous Given 7/25/18 1518)       Diagnostic Studies  Results Reviewed     Procedure Component Value Units Date/Time    Comprehensive metabolic panel [[id number]]  (Abnormal) Collected:  07/25/18 1246    Lab Status:  Final result Specimen:  Blood from Arm, Right Updated:  07/25/18 1314     Sodium 137 mmol/L      Potassium 4 0 mmol/L      Chloride 101 mmol/L      CO2 27 mmol/L      Anion Gap 9 mmol/L      BUN 14 mg/dL      Creatinine 0 80 mg/dL      Glucose 279 (H) mg/dL      Calcium 9 3 mg/dL      AST 8 U/L      ALT 18 U/L      Alkaline Phosphatase 105 U/L      Total Protein 7 4 g/dL      Albumin 3 3 (L) g/dL      Total Bilirubin 0 50 mg/dL      eGFR 85 ml/min/1 73sq m     Narrative:         National Kidney Disease Education Program recommendations are as follows:  GFR calculation is accurate only with a steady state creatinine  Chronic Kidney disease less than 60 ml/min/1 73 sq  meters  Kidney failure less than 15 ml/min/1 73 sq  meters      Lipase [[id number]]  (Normal) Collected:  07/25/18 1246    Lab Status:  Final result Specimen:  Blood from Arm, Right Updated:  07/25/18 1314     Lipase 138 u/L     Urine Microscopic [[id number]]  (Abnormal) Collected:  07/25/18 1247    Lab Status:  Final result Specimen:  Urine from Urine, Clean Catch Updated:  07/25/18 1310     RBC, UA 1-2 (A) /hpf      WBC, UA 4-10 (A) /hpf      Epithelial Cells Occasional /hpf      Bacteria, UA None Seen /hpf      MUCOUS THREADS Occasional (A)    CBC and differential [[id number]] Collected:  07/25/18 1246    Lab Status:  Final result Specimen:  Blood from Arm, Right Updated:  07/25/18 1259     WBC 6 45 Thousand/uL      RBC 4 74 Million/uL      Hemoglobin 13 7 g/dL      Hematocrit 40 8 %      MCV 86 fL      MCH 28 9 pg      MCHC 33 6 g/dL      RDW 11 6 %      MPV 10 2 fL      Platelets 441 Thousands/uL      nRBC 0 /100 WBCs      Neutrophils Relative 60 %      Immat GRANS % 0 %      Lymphocytes Relative 31 %      Monocytes Relative 8 %      Eosinophils Relative 1 %      Basophils Relative 0 %      Neutrophils Absolute 3 84 Thousands/µL      Immature Grans Absolute 0 01 Thousand/uL      Lymphocytes Absolute 2 01 Thousands/µL      Monocytes Absolute 0 50 Thousand/µL      Eosinophils Absolute 0 07 Thousand/µL      Basophils Absolute 0 02 Thousands/µL     UA w Reflex to Microscopic [[id number]]  (Abnormal) Collected:  07/25/18 1247    Lab Status:  Final result Specimen:  Urine from Urine, Clean Catch Updated:  07/25/18 1259     Color, UA Yellow     Clarity, UA Clear     Specific Gravity, UA 1 015     pH, UA 6 0     Leukocytes, UA Elevated glucose may cause decreased leukocyte values  See urine microscopic for [facility] result/ (A)     Nitrite, UA Negative     Protein, UA Negative mg/dl      Glucose, UA >=1000 (1%) (A) mg/dl      Ketones, UA Negative mg/dl      Urobilinogen, UA 0 2 E U /dl      Bilirubin, UA Negative     Blood, UA Negative    POCT pregnancy, urine [[id number]]  (Normal) Resulted:  07/25/18 1254    Lab Status:  Final result Specimen:  Urine Updated:  07/25/18 1254     EXT PREG TEST UR (Ref: Negative) negative                 CT abdomen pelvis with contrast   Final Result by Obinna Luna MD (07/25 1406)      Conglomeration of thickened, edematous small bowel loops in the anterior abdomen with associated interloop abscess measuring 6 1 x 1 7 cm abutting the anterior abdominal wall underlying a ventral hernia mesh repair    It is uncertain whether this    represents primary infection and abscess of the hernia mesh with secondary involvement of the adjacent small bowel loops, versus a primary infectious or inflammatory enterocolitis  I personally discussed this study with Remigio Mcardle on 2018 at 2:01 PM                Workstation performed: [id number]                    Procedures  Procedures       Phone Contacts  ED Phone Contact    ED Course  ED Course as of  1334   Wed 2018   1439 Consult called and placed and d/w Dr Aga Mcconnell and surgery PA    6476 Pt seen and evaluated by surgery  Pt does not have elevated WBC count and fever  Think it is more seroma vs abscess and enteritis and think she can go and f/u with pain medication and be re-assessed and seen tomorrow by Dr Aga Mcconnell in the office  Pt agreeable with this plan b/c she states she " on the table" last time and would rather die than have another surgery                                  MDM  Number of Diagnoses or Management Options  Abdominal pain: new and requires workup  Enteritis: new and requires workup  Intra-abdominal fluid collection:      Amount and/or Complexity of Data Reviewed  Clinical lab tests: ordered and reviewed  Tests in the radiology section of CPT®: ordered and reviewed  Discuss the patient with other providers: yes (Called and d/w Dr Aga Mcconnell and sending his PA to evaluate as well )    Patient Progress  Patient progress: stable    CritCare Time    Disposition  Final diagnoses:   Abdominal pain   Enteritis   Intra-abdominal fluid collection     Time reflects when diagnosis was documented in both MDM as applicable and the Disposition within this note     Time User Action Codes Description Comment    2018  4:05 PM Lina Sebastian Add [K43 6] Ventral hernia with obstruction and without gangrene     2018  4:05 PM Rosario Sebastian Modify [K43 6] Ventral hernia with obstruction and without gangrene     2018  4:05 PM Lina Sebastian [K43 6] Ventral hernia with obstruction and without gangrene     7/25/2018  4:24 PM Guillermina Winslow [address] [R10 9] Abdominal pain     7/25/2018  4:24 PM Guillermina Winslow [address] [K52 9] Enteritis     7/25/2018  4:25 PM [address]ree[address] [R18 8] Intra-abdominal fluid collection       ED Disposition     ED Disposition Condition Comment    Discharge  Dolph Chin Flyte discharge to home/self care      Condition at discharge: Good        Follow-up Information     Follow up With Specialties Details Why [address] [address], MD [facility]   [address] [facility]      Liliana Rogers MD General Surgery Go in 1 day To be re-assessed by Dr Kristina Senior [address] [facility] [address]  [phone]            Discharge Medication List as of 7/25/2018  4:26 PM      START taking these medications    Details   oxyCODONE-acetaminophen (PERCOCET) 5-325 mg per tablet Take 1 tablet by mouth every 4 (four) hours as needed for moderate pain for up to 10 days Max Daily Amount: 6 tablets, Starting Wed 7/25/2018, Until Sat 8/4/2018, Print         CONTINUE these medications which have NOT CHANGED    Details   ascorbic acid (VITAMIN C) 500 mg tablet Take 500 mg by mouth daily, Until Discontinued, Historical Med      aspirin 325 mg tablet Take 325 mg by mouth daily, Historical Med      atorvastatin (LIPITOR) 10 mg tablet Take 1 tablet (10 mg total) by mouth daily, Starting Wed 2/28/2018, Normal      calcium carbonate (TUMS) 500 mg chewable tablet Chew 1 tablet daily, Historical Med      Cyanocobalamin (B-12 PO) Take 1 tablet by mouth  , Historical Med      dicyclomine (BENTYL) 20 mg tablet Take 1 tablet (20 mg total) by mouth 3 (three) times a day as needed (cramping or abdominal pain) for up to 10 days, Starting Wed 2/28/2018, Until Sat 3/10/2018, Print      dimenhyDRINATE (DRAMAMINE) 50 mg tablet Take 50 mg by mouth 2 (two) times a day  , Until Discontinued, Historical Med      docusate sodium (COLACE) 100 mg capsule Take 100 mg by mouth 2 (two) times a day as needed  , Until Discontinued, Historical Med      famotidine (PEPCID) 20 mg tablet Take 20 mg by mouth 2 (two) times a day, Historical Med      glimepiride (AMARYL) 1 mg tablet Take 1 tablet (1 mg total) by mouth daily with breakfast, Starting Wed 2/28/2018, Normal      ibuprofen (MOTRIN) 600 mg tablet Take 1 tablet by mouth every 6 (six) hours as needed for fever for up to 12 doses, Starting Thu 10/26/2017, Print      lisinopril (ZESTRIL) 5 mg tablet Take 1 tablet (5 mg total) by mouth daily, Starting Wed 2/28/2018, Normal      metFORMIN (GLUCOPHAGE) 850 mg tablet Take 1 tablet (850 mg total) by mouth 2 (two) times a day with meals, Starting Wed 5/23/2018, Normal      omeprazole (PriLOSEC) 20 mg delayed release capsule Take 1 capsule (20 mg total) by mouth daily, Starting Wed 2/28/2018, Normal           No discharge procedures on file      ED Provider  Electronically Signed by           Jennifer Boyd MD  07/30/18 0167

## 2018-08-07 ENCOUNTER — OFFICE VISIT (OUTPATIENT)
Dept: SURGERY | Facility: CLINIC | Age: 53
End: 2018-08-07
Payer: COMMERCIAL

## 2018-08-07 VITALS
TEMPERATURE: 97.9 F | DIASTOLIC BLOOD PRESSURE: 70 MMHG | HEIGHT: 63 IN | BODY MASS INDEX: 23.04 KG/M2 | RESPIRATION RATE: 12 BRPM | WEIGHT: 130 LBS | SYSTOLIC BLOOD PRESSURE: 98 MMHG | HEART RATE: 61 BPM

## 2018-08-07 DIAGNOSIS — E78.5 DYSLIPIDEMIA: ICD-10-CM

## 2018-08-07 DIAGNOSIS — R10.33 PERIUMBILICAL ABDOMINAL PAIN: Primary | ICD-10-CM

## 2018-08-07 DIAGNOSIS — IMO0001 UNCONTROLLED TYPE 2 DIABETES MELLITUS WITHOUT COMPLICATION, WITHOUT LONG-TERM CURRENT USE OF INSULIN: Primary | ICD-10-CM

## 2018-08-07 PROCEDURE — 99213 OFFICE O/P EST LOW 20 MIN: CPT | Performed by: SURGERY

## 2018-08-07 NOTE — PROGRESS NOTES
Follow Up - General Surgery   Kaity Parr 47 y o  female MRN: [id number]  Unit/Bed#:  Encounter: [id number]    Assessment/Plan     Assessment:  Abdominal pain, improved  Plan:  The patient is doing quite well with the abdominal pain, feels a little bit nauseous, having bowel movement  She will follow up my office in 2 weeks  History of Present Illness     HPI:  Kaity Parr is a 47 y o  female who presents with her son for follow-up  The patient stated that she is walking more with minimal pain or discomfort, she feels nauseous once in a while, denies any vomiting, she is having bowel movement  She denies having any fever, chills or any other constitutional symptoms  Review of Systems: The rest of the review of system total of 10 were negative except for the HPI  Historical Information   Past Medical History:   Diagnosis Date    Arthritis     Colonic polyp     resolved: 08/25/2017    Diabetes mellitus ([place] [address] )     Dry eye syndrome of bilateral lacrimal glands     last assessed: 05/15/2015    GERD (gastroesophageal reflux disease)     History of screening mammography     last assessed: 07/25/2016    Hypertension     Lyme disease     Osteoarthritis     last assessed: 07/25/2016    RA (rheumatoid arthritis) ([place] [address] )     Rheumatoid arteritis     Rheumatoid arthritis ([facility])     osteoarthritis    Stroke ([place] [address] )     Symptomatic hypotension     last assessed: 03/22/2017    Ventral hernia with obstruction and without gangrene     last assessed: 02/16/2017    Vertigo      Past Surgical History:   Procedure Laterality Date    BACK SURGERY      CYST REMOVAL      EGD AND COLONOSCOPY N/A 2/3/2017    Procedure: COLONOSCOPY; POSSIBLE POLYPECTOMY; POSSIBLE BIOPSY AND EGD ;  Surgeon: Puneet Stiles MD;  [facility]: [facility];   Service:     HERNIA REPAIR      LIPOMA RESECTION      OTHER SURGICAL HISTORY      excision of sebaceous cst to back    TUBAL LIGATION      VENTRAL HERNIA REPAIR N/A 3/17/2017 Procedure: LAPAROSCOPIC VENTRAL HERNIA REPAIR WITH MESH ;  Surgeon: Carroll Cockayne, MD;  [facility]: [facility];  Service:      Social History   History   Alcohol Use No     Comment: Stopped drinking alcohol      History   Drug Use No     History   Smoking Status    Former Smoker    Quit date: 1990   Smokeless Tobacco    Never Used     Family History: non-contributory    Meds/Allergies   all medications and allergies reviewed     Current Outpatient Prescriptions:     ascorbic acid (VITAMIN C) 500 mg tablet, Take 500 mg by mouth daily, Disp: , Rfl:     aspirin 325 mg tablet, Take 325 mg by mouth daily, Disp: , Rfl:     atorvastatin (LIPITOR) 10 mg tablet, Take 1 tablet (10 mg total) by mouth daily, Disp: 30 tablet, Rfl: 5    calcium carbonate (TUMS) 500 mg chewable tablet, Chew 1 tablet daily, Disp: , Rfl:     Cyanocobalamin (B-12 PO), Take 1 tablet by mouth  , Disp: , Rfl:     dicyclomine (BENTYL) 20 mg tablet, Take 1 tablet (20 mg total) by mouth 3 (three) times a day as needed (cramping or abdominal pain) for up to 10 days, Disp: 90 tablet, Rfl: 5    dimenhyDRINATE (DRAMAMINE) 50 mg tablet, Take 50 mg by mouth 2 (two) times a day  , Disp: , Rfl:     docusate sodium (COLACE) 100 mg capsule, Take 100 mg by mouth 2 (two) times a day as needed  , Disp: , Rfl:     famotidine (PEPCID) 20 mg tablet, Take 20 mg by mouth 2 (two) times a day, Disp: , Rfl:     glimepiride (AMARYL) 1 mg tablet, Take 1 tablet (1 mg total) by mouth daily with breakfast, Disp: 30 tablet, Rfl: 5    ibuprofen (MOTRIN) 600 mg tablet, Take 1 tablet by mouth every 6 (six) hours as needed for fever for up to 12 doses, Disp: 12 tablet, Rfl: 0    ibuprofen (MOTRIN) 600 mg tablet, Take 1 tablet (600 mg total) by mouth every 8 (eight) hours, Disp: 30 tablet, Rfl: 0    lisinopril (ZESTRIL) 5 mg tablet, Take 1 tablet (5 mg total) by mouth daily, Disp: 30 tablet, Rfl: 5    metFORMIN (GLUCOPHAGE) 850 mg tablet, Take 1 tablet (850 mg total) by mouth 2 (two) times a day with meals, Disp: 60 tablet, Rfl: 0    omeprazole (PriLOSEC) 20 mg delayed release capsule, Take 1 capsule (20 mg total) by mouth daily, Disp: 30 capsule, Rfl: 5  Allergies   Allergen Reactions    Penicillins        Objective     Current Vitals:   Blood Pressure: 98/70 (08/07/18 1009)  Pulse: 61 (08/07/18 1009)  Temperature: 97 9 °F (36 6 °C) (08/07/18 1009)  Temp Source: Oral (08/07/18 1009)  Respirations: 12 (08/07/18 1009)  Height: 5' 3" (160 cm) (08/07/18 1009)  Weight - Scale: 59 kg (130 lb) (08/07/18 1009)      Invasive Devices     Peripheral Intravenous Line            Peripheral IV 07/25/18 Right Antecubital 12 days                Physical Exam   Constitutional: She is oriented to person, place, and time  She appears well-developed and well-nourished  No distress  HENT:   Head: Normocephalic  Mouth/Throat: No oropharyngeal exudate  Eyes: Pupils are equal, round, and reactive to light  No scleral icterus  Neck: Normal range of motion  Cardiovascular: Normal rate and regular rhythm  No murmur heard  Pulmonary/Chest: Effort normal and breath sounds normal  No respiratory distress  Abdominal:   Abdomen is soft, nondistended and mildly tender in the mid abdomen without guarding or rebound  There is no evidence of umbilical hernia recurrence  There is no visceromegaly or mass palpable  Musculoskeletal: She exhibits no edema or tenderness  Lymphadenopathy:     She has no cervical adenopathy  Neurological: She is alert and oriented to person, place, and time  No cranial nerve deficit  Skin: No rash noted  No erythema  Psychiatric: She has a normal mood and affect   Her behavior is normal

## 2018-08-11 LAB
CREAT ?TM UR-SCNC: 152 UMOL/L
HBA1C MFR BLD HPLC: 8.4 %
MICROALBUMIN UR-MCNC: 0.9 MG/L (ref 0–20)
MICROALBUMIN/CREAT UR: 5.9 MG/G{CREAT}

## 2018-08-11 PROCEDURE — 3061F NEG MICROALBUMINURIA REV: CPT | Performed by: FAMILY MEDICINE

## 2018-08-16 ENCOUNTER — TELEPHONE (OUTPATIENT)
Dept: FAMILY MEDICINE CLINIC | Facility: CLINIC | Age: 53
End: 2018-08-16

## 2018-08-16 NOTE — TELEPHONE ENCOUNTER
Left message to schedule follow up    ----- Message from Jennie Kothari PA-C sent at 8/16/2018 11:56 AM EDT -----  Call  Pt  Her  Labs  Ar in  Her  Diabetes  Is not at  Goal   She  Should  Make  F/u  Appt    She  Sees Dr Nicolás Salazar

## 2018-11-13 ENCOUNTER — TELEPHONE (OUTPATIENT)
Dept: FAMILY MEDICINE CLINIC | Facility: CLINIC | Age: 53
End: 2018-11-13

## 2018-11-13 NOTE — TELEPHONE ENCOUNTER
----- Message from Yelena Ryan MA sent at 11/13/2018  9:54 AM EST -----  Regarding: need dm appt  Patient overdue for diabetes check

## 2019-02-12 ENCOUNTER — HOSPITAL ENCOUNTER (OUTPATIENT)
Facility: HOSPITAL | Age: 54
Setting detail: OBSERVATION
Discharge: HOME HEALTH | End: 2019-02-13
Attending: EMERGENCY MEDICINE | Admitting: STUDENT IN AN ORGANIZED HEALTH CARE EDUCATION/TRAINING PROGRAM
Payer: COMMERCIAL

## 2019-02-12 ENCOUNTER — APPOINTMENT (EMERGENCY)
Dept: CT IMAGING | Facility: HOSPITAL | Age: 54
End: 2019-02-12
Payer: COMMERCIAL

## 2019-02-12 DIAGNOSIS — G45.9 TIA (TRANSIENT ISCHEMIC ATTACK): Primary | ICD-10-CM

## 2019-02-12 DIAGNOSIS — IMO0002 UNCONTROLLED TYPE 2 DIABETES MELLITUS, WITHOUT LONG-TERM CURRENT USE OF INSULIN: ICD-10-CM

## 2019-02-12 DIAGNOSIS — R11.10 VOMITING: ICD-10-CM

## 2019-02-12 DIAGNOSIS — R51.9 ACUTE NONINTRACTABLE HEADACHE, UNSPECIFIED HEADACHE TYPE: ICD-10-CM

## 2019-02-12 DIAGNOSIS — R73.9 HYPERGLYCEMIA: ICD-10-CM

## 2019-02-12 DIAGNOSIS — R42 VERTIGO: ICD-10-CM

## 2019-02-12 PROBLEM — M19.90 OSTEOARTHRITIS: Status: ACTIVE | Noted: 2019-02-12

## 2019-02-12 LAB
ALBUMIN SERPL BCP-MCNC: 3.9 G/DL (ref 3.5–5)
ALP SERPL-CCNC: 145 U/L (ref 46–116)
ALT SERPL W P-5'-P-CCNC: 21 U/L (ref 12–78)
ANION GAP SERPL CALCULATED.3IONS-SCNC: 8 MMOL/L (ref 4–13)
APTT PPP: 25 SECONDS (ref 26–38)
AST SERPL W P-5'-P-CCNC: 16 U/L (ref 5–45)
BACTERIA UR QL AUTO: NORMAL /HPF
BASOPHILS # BLD AUTO: 0.02 THOUSANDS/ΜL (ref 0–0.1)
BASOPHILS NFR BLD AUTO: 0 % (ref 0–1)
BILIRUB SERPL-MCNC: 0.4 MG/DL (ref 0.2–1)
BILIRUB UR QL STRIP: NEGATIVE
BUN SERPL-MCNC: 16 MG/DL (ref 5–25)
CALCIUM SERPL-MCNC: 9.2 MG/DL (ref 8.3–10.1)
CHLORIDE SERPL-SCNC: 101 MMOL/L (ref 100–108)
CLARITY UR: CLEAR
CO2 SERPL-SCNC: 28 MMOL/L (ref 21–32)
COLOR UR: YELLOW
CREAT SERPL-MCNC: 0.72 MG/DL (ref 0.6–1.3)
EOSINOPHIL # BLD AUTO: 0.05 THOUSAND/ΜL (ref 0–0.61)
EOSINOPHIL NFR BLD AUTO: 1 % (ref 0–6)
ERYTHROCYTE [DISTWIDTH] IN BLOOD BY AUTOMATED COUNT: 11.7 % (ref 11.6–15.1)
GFR SERPL CREATININE-BSD FRML MDRD: 97 ML/MIN/1.73SQ M
GLUCOSE SERPL-MCNC: 215 MG/DL (ref 65–140)
GLUCOSE SERPL-MCNC: 233 MG/DL (ref 65–140)
GLUCOSE SERPL-MCNC: 238 MG/DL (ref 65–140)
GLUCOSE SERPL-MCNC: 330 MG/DL (ref 65–140)
GLUCOSE SERPL-MCNC: 343 MG/DL (ref 65–140)
GLUCOSE UR STRIP-MCNC: ABNORMAL MG/DL
HCT VFR BLD AUTO: 44.9 % (ref 34.8–46.1)
HGB BLD-MCNC: 15 G/DL (ref 11.5–15.4)
HGB UR QL STRIP.AUTO: NEGATIVE
IMM GRANULOCYTES # BLD AUTO: 0.01 THOUSAND/UL (ref 0–0.2)
IMM GRANULOCYTES NFR BLD AUTO: 0 % (ref 0–2)
INR PPP: 0.91 (ref 0.86–1.17)
KETONES UR STRIP-MCNC: NEGATIVE MG/DL
LEUKOCYTE ESTERASE UR QL STRIP: ABNORMAL
LIPASE SERPL-CCNC: 150 U/L (ref 73–393)
LYMPHOCYTES # BLD AUTO: 1.94 THOUSANDS/ΜL (ref 0.6–4.47)
LYMPHOCYTES NFR BLD AUTO: 24 % (ref 14–44)
MCH RBC QN AUTO: 28.6 PG (ref 26.8–34.3)
MCHC RBC AUTO-ENTMCNC: 33.4 G/DL (ref 31.4–37.4)
MCV RBC AUTO: 86 FL (ref 82–98)
MONOCYTES # BLD AUTO: 0.39 THOUSAND/ΜL (ref 0.17–1.22)
MONOCYTES NFR BLD AUTO: 5 % (ref 4–12)
NEUTROPHILS # BLD AUTO: 5.56 THOUSANDS/ΜL (ref 1.85–7.62)
NEUTS SEG NFR BLD AUTO: 70 % (ref 43–75)
NITRITE UR QL STRIP: NEGATIVE
NON-SQ EPI CELLS URNS QL MICRO: NORMAL /HPF
NRBC BLD AUTO-RTO: 0 /100 WBCS
PH UR STRIP.AUTO: 6 [PH] (ref 4.5–8)
PLATELET # BLD AUTO: 161 THOUSANDS/UL (ref 149–390)
PMV BLD AUTO: 10.5 FL (ref 8.9–12.7)
POTASSIUM SERPL-SCNC: 4.5 MMOL/L (ref 3.5–5.3)
PROT SERPL-MCNC: 7.8 G/DL (ref 6.4–8.2)
PROT UR STRIP-MCNC: NEGATIVE MG/DL
PROTHROMBIN TIME: 12.2 SECONDS (ref 11.8–14.2)
RBC # BLD AUTO: 5.25 MILLION/UL (ref 3.81–5.12)
RBC #/AREA URNS AUTO: NORMAL /HPF
SODIUM SERPL-SCNC: 137 MMOL/L (ref 136–145)
SP GR UR STRIP.AUTO: 1.02 (ref 1–1.03)
TROPONIN I SERPL-MCNC: <0.02 NG/ML
UROBILINOGEN UR QL STRIP.AUTO: 0.2 E.U./DL
WBC # BLD AUTO: 7.97 THOUSAND/UL (ref 4.31–10.16)
WBC #/AREA URNS AUTO: NORMAL /HPF

## 2019-02-12 PROCEDURE — 96361 HYDRATE IV INFUSION ADD-ON: CPT

## 2019-02-12 PROCEDURE — 84484 ASSAY OF TROPONIN QUANT: CPT | Performed by: EMERGENCY MEDICINE

## 2019-02-12 PROCEDURE — 82948 REAGENT STRIP/BLOOD GLUCOSE: CPT

## 2019-02-12 PROCEDURE — 96375 TX/PRO/DX INJ NEW DRUG ADDON: CPT

## 2019-02-12 PROCEDURE — 85610 PROTHROMBIN TIME: CPT | Performed by: EMERGENCY MEDICINE

## 2019-02-12 PROCEDURE — 83690 ASSAY OF LIPASE: CPT | Performed by: EMERGENCY MEDICINE

## 2019-02-12 PROCEDURE — 85025 COMPLETE CBC W/AUTO DIFF WBC: CPT | Performed by: EMERGENCY MEDICINE

## 2019-02-12 PROCEDURE — 99220 PR INITIAL OBSERVATION CARE/DAY 70 MINUTES: CPT | Performed by: STUDENT IN AN ORGANIZED HEALTH CARE EDUCATION/TRAINING PROGRAM

## 2019-02-12 PROCEDURE — 80053 COMPREHEN METABOLIC PANEL: CPT | Performed by: EMERGENCY MEDICINE

## 2019-02-12 PROCEDURE — 96374 THER/PROPH/DIAG INJ IV PUSH: CPT

## 2019-02-12 PROCEDURE — 70450 CT HEAD/BRAIN W/O DYE: CPT

## 2019-02-12 PROCEDURE — 81001 URINALYSIS AUTO W/SCOPE: CPT | Performed by: EMERGENCY MEDICINE

## 2019-02-12 PROCEDURE — 85730 THROMBOPLASTIN TIME PARTIAL: CPT | Performed by: EMERGENCY MEDICINE

## 2019-02-12 PROCEDURE — 93005 ELECTROCARDIOGRAM TRACING: CPT

## 2019-02-12 PROCEDURE — 36415 COLL VENOUS BLD VENIPUNCTURE: CPT | Performed by: EMERGENCY MEDICINE

## 2019-02-12 PROCEDURE — 99285 EMERGENCY DEPT VISIT HI MDM: CPT

## 2019-02-12 RX ORDER — MECLIZINE HCL 12.5 MG/1
12.5 TABLET ORAL EVERY 8 HOURS PRN
Status: DISCONTINUED | OUTPATIENT
Start: 2019-02-12 | End: 2019-02-13 | Stop reason: HOSPADM

## 2019-02-12 RX ORDER — DIAZEPAM 5 MG/1
5 TABLET ORAL ONCE
Status: COMPLETED | OUTPATIENT
Start: 2019-02-12 | End: 2019-02-12

## 2019-02-12 RX ORDER — ASPIRIN 325 MG
325 TABLET ORAL DAILY
Status: DISCONTINUED | OUTPATIENT
Start: 2019-02-12 | End: 2019-02-13 | Stop reason: HOSPADM

## 2019-02-12 RX ORDER — DIPHENHYDRAMINE HYDROCHLORIDE 50 MG/ML
25 INJECTION INTRAMUSCULAR; INTRAVENOUS ONCE
Status: COMPLETED | OUTPATIENT
Start: 2019-02-12 | End: 2019-02-12

## 2019-02-12 RX ORDER — ASPIRIN 81 MG/1
324 TABLET, CHEWABLE ORAL ONCE
Status: COMPLETED | OUTPATIENT
Start: 2019-02-12 | End: 2019-02-12

## 2019-02-12 RX ORDER — PANTOPRAZOLE SODIUM 40 MG/1
40 TABLET, DELAYED RELEASE ORAL
Status: DISCONTINUED | OUTPATIENT
Start: 2019-02-13 | End: 2019-02-13 | Stop reason: HOSPADM

## 2019-02-12 RX ORDER — DICYCLOMINE HCL 20 MG
20 TABLET ORAL 3 TIMES DAILY PRN
Status: DISCONTINUED | OUTPATIENT
Start: 2019-02-12 | End: 2019-02-13 | Stop reason: HOSPADM

## 2019-02-12 RX ORDER — IBUPROFEN 600 MG/1
600 TABLET ORAL EVERY 8 HOURS SCHEDULED
Status: DISCONTINUED | OUTPATIENT
Start: 2019-02-12 | End: 2019-02-12

## 2019-02-12 RX ORDER — LISINOPRIL 5 MG/1
5 TABLET ORAL DAILY
Status: DISCONTINUED | OUTPATIENT
Start: 2019-02-12 | End: 2019-02-13 | Stop reason: HOSPADM

## 2019-02-12 RX ORDER — ATORVASTATIN CALCIUM 10 MG/1
10 TABLET, FILM COATED ORAL DAILY
Status: DISCONTINUED | OUTPATIENT
Start: 2019-02-12 | End: 2019-02-13 | Stop reason: HOSPADM

## 2019-02-12 RX ORDER — DOCUSATE SODIUM 100 MG/1
100 CAPSULE, LIQUID FILLED ORAL 2 TIMES DAILY
Status: DISCONTINUED | OUTPATIENT
Start: 2019-02-12 | End: 2019-02-13 | Stop reason: HOSPADM

## 2019-02-12 RX ORDER — ACETAMINOPHEN 325 MG/1
650 TABLET ORAL EVERY 6 HOURS PRN
Status: DISCONTINUED | OUTPATIENT
Start: 2019-02-12 | End: 2019-02-13 | Stop reason: HOSPADM

## 2019-02-12 RX ORDER — ASCORBIC ACID 500 MG
500 TABLET ORAL DAILY
Status: DISCONTINUED | OUTPATIENT
Start: 2019-02-12 | End: 2019-02-13 | Stop reason: HOSPADM

## 2019-02-12 RX ORDER — METOCLOPRAMIDE HYDROCHLORIDE 5 MG/ML
10 INJECTION INTRAMUSCULAR; INTRAVENOUS ONCE
Status: COMPLETED | OUTPATIENT
Start: 2019-02-12 | End: 2019-02-12

## 2019-02-12 RX ADMIN — LISINOPRIL 5 MG: 5 TABLET ORAL at 17:44

## 2019-02-12 RX ADMIN — ASPIRIN 325 MG: 325 TABLET ORAL at 17:44

## 2019-02-12 RX ADMIN — DIPHENHYDRAMINE HYDROCHLORIDE 25 MG: 50 INJECTION, SOLUTION INTRAMUSCULAR; INTRAVENOUS at 09:37

## 2019-02-12 RX ADMIN — ASPIRIN 81 MG 324 MG: 81 TABLET ORAL at 12:04

## 2019-02-12 RX ADMIN — DIAZEPAM 5 MG: 5 TABLET ORAL at 09:21

## 2019-02-12 RX ADMIN — METOCLOPRAMIDE 10 MG: 5 INJECTION, SOLUTION INTRAMUSCULAR; INTRAVENOUS at 09:39

## 2019-02-12 RX ADMIN — INSULIN LISPRO 2 UNITS: 100 INJECTION, SOLUTION INTRAVENOUS; SUBCUTANEOUS at 22:45

## 2019-02-12 RX ADMIN — ENOXAPARIN SODIUM 40 MG: 40 INJECTION SUBCUTANEOUS at 17:45

## 2019-02-12 RX ADMIN — INSULIN LISPRO 1 UNITS: 100 INJECTION, SOLUTION INTRAVENOUS; SUBCUTANEOUS at 16:23

## 2019-02-12 RX ADMIN — ATORVASTATIN CALCIUM 10 MG: 10 TABLET, FILM COATED ORAL at 17:44

## 2019-02-12 RX ADMIN — OXYCODONE HYDROCHLORIDE AND ACETAMINOPHEN 500 MG: 500 TABLET ORAL at 17:44

## 2019-02-12 RX ADMIN — ACETAMINOPHEN 650 MG: 325 TABLET, FILM COATED ORAL at 18:52

## 2019-02-12 RX ADMIN — SODIUM CHLORIDE 1000 ML: 0.9 INJECTION, SOLUTION INTRAVENOUS at 09:43

## 2019-02-12 RX ADMIN — DOCUSATE SODIUM 100 MG: 100 CAPSULE, LIQUID FILLED ORAL at 17:44

## 2019-02-12 NOTE — ASSESSMENT & PLAN NOTE
History of chronic vertigo presents to ER complaining of worsening of symptoms  Patient reports that vertigo resolves when she takes diphenhydramine at home  Reports she ran out of diphenhydramine and woke up this morning with worsening of vertigo  She describes vertigo as room spinning around  No significant lab abnormalities noted other than uncontrolled glucose  CT head negative for any acute abnormalities  Patient did receive Benadryl in ER  Currently reports vertigo has resolved  Denies chest pain, dyspnea, fever, palpitation, headache, loss of consciousness, chills, blurry vision, tinnitus, recent illness, nausea, vomiting, pain, dysuria, diarrhea, focal neurological complaints  Denies any new complaints  Reports feeling much better  Likely due to BPPV   Continue tele monitoring  Orthostatic vital monitoring  Meclizine p r n    PT eval  Continue supportive care

## 2019-02-12 NOTE — ASSESSMENT & PLAN NOTE
Lab Results   Component Value Date    HGBA1C 8 4 08/11/2018       Recent Labs     02/12/19  0941   POCGLU 343*       Blood Sugar Average: Last 72 hrs:  (P) 343     Most recent A1c was in August 8 4  Follow-up A1c  Patient is on metformin and Amaryl  Patient reports that she has not been taking metformin because it makes her stomach upset  Noted sugars in 300s  Will hold p o   Agents  Diabetic diet  Sliding scale coverage  Inpatient goal should be 140-180

## 2019-02-12 NOTE — H&P
H&P- John Going 1965, 47 y o  female MRN: [id number]    Unit/Bed#: -01 Encounter: [id number]    Primary Care Provider: No primary care provider on file  Date and time admitted to hospital: 2/12/2019  8:51 AM        * Vertigo  Assessment & Plan  History of chronic vertigo presents to ER complaining of worsening of symptoms  Patient reports that vertigo resolves when she takes diphenhydramine at home  Reports she ran out of diphenhydramine and woke up this morning with worsening of vertigo  She describes vertigo as room spinning around  No significant lab abnormalities noted other than uncontrolled glucose  CT head negative for any acute abnormalities  Patient did receive Benadryl in ER  Currently reports vertigo has resolved  Denies chest pain, dyspnea, fever, palpitation, headache, loss of consciousness, chills, blurry vision, tinnitus, recent illness, nausea, vomiting, pain, dysuria, diarrhea, focal neurological complaints  Denies any new complaints  Reports feeling much better  Likely due to BPPV   Continue tele monitoring  Orthostatic vital monitoring  Meclizine p r n  PT eval  Continue supportive care      Osteoarthritis  Assessment & Plan  Reported history of osteoarthritis takes NSAIDs  Limit use of NSAIDs  PT eval    Dyslipidemia  Assessment & Plan  Continue meds    Uncontrolled type 2 diabetes mellitus, without long-term current use of insulin [facility])  Assessment & Plan  Lab Results   Component Value Date    HGBA1C 8 4 08/11/2018       Recent Labs     02/12/19  0941   POCGLU 343*       Blood Sugar Average: Last 72 hrs:  (P) 343     Most recent A1c was in August 8 4  Follow-up A1c  Patient is on metformin and Amaryl  Patient reports that she has not been taking metformin because it makes her stomach upset  Noted sugars in 300s  Will hold p o   Agents  Diabetic diet  Sliding scale coverage  Inpatient goal should be 140-180      GERD (gastroesophageal reflux disease)  Assessment & Plan  History of GERD on PPI  Currently stable  Continue home medication    VTE Prophylaxis: Enoxaparin (Lovenox)  / sequential compression device   Code Status:  Level 1 full code  POLST: POLST form is not discussed and not completed at this time  Discussion with family:   present at bedside  Answered all questions appropriate  Anticipated Length of Stay:  Patient will be admitted on an Observation basis with an anticipated length of stay of  < 2 midnights  Justification for Hospital Stay:  Vertigo    Total Time for Visit, including Counseling / Coordination of Care: 1 hour  Greater than 50% of this total time spent on direct patient counseling and coordination of care  Chief Complaint:   Vertigo    History of Present Illness:    Aurora Muñoz is a 47 y o  female with past medical history of vertigo, diabetes, osteoarthritis,? Rheumatoid arthritis, GERD, hernia, ? TIA who presents with complaining of vertigo this morning associated with an episode of nonbloody vomiting  Patient describes vertigo as room spinning around  Reports that changing position appears to be exacerbating her symptoms  Patient reports that she used to to take diphenhydramine for vertigo which she  run out  During initial evaluation in ED patient had complain of associated headache, numbness in both arms and both legs  On my encounter patient reports feeling much better  Reports that vertigo was resolved  Denies chest pain, dyspnea, fever, chills, blurry vision, vertigo, tinnitus, recent illness, abdominal pain, dysuria, diarrhea, focal neurological complaints  Last menstrual cycle was when she was 51years of age  Patient also reports that she has stopped taking metformin since she was getting upset stomach  Also reports that she is in process of getting a new primary care provider  Lives with   Ambulates independently  Former smoker, denies alcohol use  Level 1 full code    No other events reported  Review of Systems:    Review of Systems   Constitutional: Negative for chills, fatigue and fever  HENT: Negative for congestion  Eyes: Negative for photophobia  Respiratory: Negative for chest tightness and shortness of breath  Cardiovascular: Negative for chest pain and palpitations  Gastrointestinal: Negative for abdominal pain and diarrhea  Endocrine: Negative for polyuria  Genitourinary: Negative for dysuria  Musculoskeletal: Positive for gait problem  Neurological: Negative for tremors and syncope  Psychiatric/Behavioral: Negative for agitation  Past Medical and Surgical History:     Past Medical History:   Diagnosis Date    Arthritis     Colonic polyp     resolved: 08/25/2017    Diabetes mellitus ([place] [address] )     Dry eye syndrome of bilateral lacrimal glands     last assessed: 05/15/2015    GERD (gastroesophageal reflux disease)     History of screening mammography     last assessed: 07/25/2016    Hypertension     Lyme disease     Osteoarthritis     last assessed: 07/25/2016    RA (rheumatoid arthritis) ([place] [address] )     Rheumatoid arteritis     Rheumatoid arthritis (HCC)     osteoarthritis    Stroke ([place] [address] )     Symptomatic hypotension     last assessed: 03/22/2017    Ventral hernia with obstruction and without gangrene     last assessed: 02/16/2017    Vertigo        Past Surgical History:   Procedure Laterality Date    BACK SURGERY      CYST REMOVAL      EGD AND COLONOSCOPY N/A 2/3/2017    Procedure: COLONOSCOPY; POSSIBLE POLYPECTOMY; POSSIBLE BIOPSY AND EGD ;  Surgeon: Lorna Mike MD;  Location: [facility];   Service:     HERNIA REPAIR      LIPOMA RESECTION      OTHER SURGICAL HISTORY      excision of sebaceous cst to back    TUBAL LIGATION      VENTRAL HERNIA REPAIR N/A 3/17/2017    Procedure: LAPAROSCOPIC VENTRAL HERNIA REPAIR WITH MESH ;  Surgeon: Lorna Mike MD;  [facility]: [facility];  Service:        Meds/Allergies:    Prior to Admission medications    Medication Sig Start Date End Date Taking?  Authorizing Provider   Cyanocobalamin (B-12 PO) Take 1 tablet by mouth     Yes Historical Provider, MD   dimenhyDRINATE (DRAMAMINE) 50 mg tablet Take 50 mg by mouth 2 (two) times a day     Yes Historical Provider, MD   docusate sodium (COLACE) 100 mg capsule Take 100 mg by mouth 2 (two) times a day as needed     Yes Historical Provider, MD   famotidine (PEPCID) 20 mg tablet Take 20 mg by mouth 2 (two) times a day   Yes Historical Provider, MD   lisinopril (ZESTRIL) 5 mg tablet Take 1 tablet (5 mg total) by mouth daily 2/28/18  Yes Neymar Holm MD   metFORMIN (GLUCOPHAGE) 850 mg tablet Take 1 tablet (850 mg total) by mouth 2 (two) times a day with meals 5/23/18  Yes Kristel Pizano DO   omeprazole (PriLOSEC) 20 mg delayed release capsule Take 1 capsule (20 mg total) by mouth daily 2/28/18  Yes Neymar Holm MD   omeprazole (PRILOSEC) 20 mg delayed release capsule  11/20/17  Yes Historical Provider, MD   ascorbic acid (VITAMIN C) 500 mg tablet Take 500 mg by mouth daily    Historical Provider, MD   aspirin 325 mg tablet Take 325 mg by mouth daily    Historical Provider, MD   atorvastatin (LIPITOR) 10 mg tablet Take 1 tablet (10 mg total) by mouth daily  Patient not taking: Reported on 2/12/2019 2/28/18   Neymar Holm MD   calcium carbonate (TUMS) 500 mg chewable tablet Chew 1 tablet daily    Historical Provider, MD   dicyclomine (BENTYL) 20 mg tablet Take 1 tablet (20 mg total) by mouth 3 (three) times a day as needed (cramping or abdominal pain) for up to 10 days 2/28/18 3/10/18  Neymar Holm MD   glimepiride (AMARYL) 1 mg tablet Take 1 tablet (1 mg total) by mouth daily with breakfast  Patient not taking: Reported on 2/12/2019 2/28/18   Neymar Holm MD   ibuprofen (MOTRIN) 600 mg tablet Take 1 tablet by mouth every 6 (six) hours as needed for fever for up to 12 doses 10/26/17   Kevin Pinedo DO   ibuprofen (MOTRIN) 600 mg tablet Take 1 tablet (600 mg total) by mouth every 8 (eight) hours 18   Liana Boateng MD     I have reviewed home medications with patient personally  Allergies: Allergies   Allergen Reactions    Penicillins        Social History:     Marital Status: /Civil Union   Patient Pre-hospital Living Situation:  With   Patient Pre-hospital Level of Mobility:  Independent  Patient Pre-hospital Diet Restrictions:  None reported  Substance Use History:   Social History     Substance and Sexual Activity   Alcohol Use No    Comment: Stopped drinking alcohol      Social History     Tobacco Use   Smoking Status Former Smoker    Last attempt to quit:     Years since quittin    Smokeless Tobacco Never Used     Social History     Substance and Sexual Activity   Drug Use No       Family History:    non-contributory    Physical Exam:     Vitals:   Blood Pressure: 123/70 (19 1211)  Pulse: 70 (19 1211)  Temperature: 97 7 °F (36 5 °C) (19 0858)  Respirations: 15 (19 1211)  Height: 5' 3" (160 cm) (19 1355)  Weight - Scale: 60 kg (132 lb 4 4 oz) (19 1355)  SpO2: 95 % (19 1211)    Physical Exam   Constitutional: She is oriented to person, place, and time  No distress  HENT:   Head: Normocephalic and atraumatic  Eyes: Pupils are equal, round, and reactive to light  EOM are normal    Asymmetric  Patient reports that she has had dizzy eyes for longtime  Neck: Normal range of motion  Neck supple  No JVD present  Cardiovascular: Normal rate and regular rhythm  Pulmonary/Chest: Effort normal and breath sounds normal  No stridor  No respiratory distress  She has no wheezes  Abdominal: Soft  Bowel sounds are normal  She exhibits no distension  There is no tenderness  Musculoskeletal: Normal range of motion  She exhibits no edema  Neurological: She is alert and oriented to person, place, and time  Psychiatric: She has a normal mood and affect         Additional Data:     Lab Results: I have personally reviewed pertinent reports  Results from last 7 days   Lab Units 02/12/19  0941   WBC Thousand/uL 7 97   HEMOGLOBIN g/dL 15 0   HEMATOCRIT % 44 9   PLATELETS Thousands/uL 161   NEUTROS PCT % 70   LYMPHS PCT % 24   MONOS PCT % 5   EOS PCT % 1     Results from last 7 days   Lab Units 02/12/19  0941   SODIUM mmol/L 137   POTASSIUM mmol/L 4 5   CHLORIDE mmol/L 101   CO2 mmol/L 28   BUN mg/dL 16   CREATININE mg/dL 0 72   ANION GAP mmol/L 8   CALCIUM mg/dL 9 2   ALBUMIN g/dL 3 9   TOTAL BILIRUBIN mg/dL 0 40   ALK PHOS U/L 145*   ALT U/L 21   AST U/L 16   GLUCOSE RANDOM mg/dL 330*     Results from last 7 days   Lab Units 02/12/19  0941   INR  0 91     Results from last 7 days   Lab Units 02/12/19  0941   POC GLUCOSE mg/dl 343*               Imaging: I have personally reviewed pertinent reports  CT head without contrast   Final Result by Moe Phan MD (02/12 1010)      No CT evidence of an acute intracranial abnormality  Workstation performed: [id number]             EKG, Pathology, and Other Studies Reviewed on Admission:   · EKG:  Not available    Allscripts / Epic Records Reviewed: Yes     ** Please Note: This note has been constructed using a voice recognition system   **

## 2019-02-12 NOTE — ED PROVIDER NOTES
Pt Name: Bro Winslow  MRN: [id number]  [place] 1965  Age/Sex: 47 y o  female  Date of evaluation: 2/12/2019  PCP: No primary care provider on file  CHIEF COMPLAINT    Chief Complaint   Patient presents with    Vertigo - Recurrent     pt states that she has had vertigo before but this morning the dizziness and headache was alot worse than normal  pt c/o of her arms and legs being numb         HPI    47 y o  female presenting with sensation of the room spinning  Patient states she has chronic vertigo recurrent episodes with unclear diagnosis, states she has had 2 strokes in the past   She she also complains of severe headache, dull, throughout the head, not radiating otherwise, worse with light or movement better at rest   She also notes that she vomited 3 times today and complains of numbness in both arms and both legs, as well as some pain in her left leg that she states is due to rheumatoid arthritis and stomach that she states is due to her gastritis, both of which are chronic and unchanged   She states that her symptoms this morning are consistent with prior symptoms but worse than usual       HPI      Past Medical and Surgical History    Past Medical History:   Diagnosis Date    Arthritis     Colonic polyp     resolved: 08/25/2017    Diabetes mellitus ([facility] )     Dry eye syndrome of bilateral lacrimal glands     last assessed: 05/15/2015    GERD (gastroesophageal reflux disease)     History of screening mammography     last assessed: 07/25/2016    Hypertension     Lyme disease     Osteoarthritis     last assessed: 07/25/2016    RA [facility]umatoid [facility] )     Rheumatoid arteritis     Rheumatoid arthritis (HCC)     osteoarthritis    Stroke ([facility] )     Symptomatic hypotension     last assessed: 03/22/2017    Ventral hernia with obstruction and without gangrene     last assessed: 02/16/2017    Vertigo        Past Surgical History:   Procedure Laterality Date    BACK SURGERY      CYST REMOVAL      EGD AND COLONOSCOPY N/A 2/3/2017    Procedure: COLONOSCOPY; POSSIBLE POLYPECTOMY; POSSIBLE BIOPSY AND EGD ;  Surgeon: La Oliver MD;  [facility]; Service:     HERNIA REPAIR      LIPOMA RESECTION      OTHER SURGICAL HISTORY      excision of sebaceous cst to back    TUBAL LIGATION      VENTRAL HERNIA REPAIR N/A 3/17/2017    Procedure: LAPAROSCOPIC VENTRAL HERNIA REPAIR WITH MESH ;  Surgeon: La Oliver MD;  [facility];  Service:        Family History   Problem Relation Age of Onset    COPD Mother         COPD/Emphysema    Diabetes Mother     Heart disease Father     Stroke Father     COPD Father         COPD/Emphysema    Heart attack Father     Diabetes Paternal Grandmother     Breast cancer Paternal Aunt     Depression Daughter        Social History     Tobacco Use    Smoking status: Former Smoker     Last attempt to quit:      Years since quittin     Smokeless tobacco: Never Used   Substance Use Topics    Alcohol use: No     Comment: Stopped drinking alcohol     Drug use: No           Allergies    Allergies   Allergen Reactions    Penicillins        Home Medications    Prior to Admission medications    Medication Sig Start Date End Date Taking?  Authorizing Provider   ascorbic acid (VITAMIN C) 500 mg tablet Take 500 mg by mouth daily    Historical Provider, MD   aspirin 325 mg tablet Take 325 mg by mouth daily    Historical Provider, MD   atorvastatin (LIPITOR) 10 mg tablet Take 1 tablet (10 mg total) by mouth daily 18   Arpan Rosenberg MD   calcium carbonate (TUMS) 500 mg chewable tablet Chew 1 tablet daily    Historical Provider, MD   Cyanocobalamin (B-12 PO) Take 1 tablet by mouth      Historical Provider, MD   dicyclomine (BENTYL) 20 mg tablet Take 1 tablet (20 mg total) by mouth 3 (three) times a day as needed (cramping or abdominal pain) for up to 10 days 2/28/18 3/10/18  Arpan Rosenberg MD   dimenhyDRINATE (DRAMAMINE) 50 mg tablet Take 50 mg by mouth 2 (two) times a day      Historical Provider, MD   docusate sodium (COLACE) 100 mg capsule Take 100 mg by mouth 2 (two) times a day as needed      Historical Provider, MD   famotidine (PEPCID) 20 mg tablet Take 20 mg by mouth 2 (two) times a day    Historical Provider, MD   glimepiride (AMARYL) 1 mg tablet Take 1 tablet (1 mg total) by mouth daily with breakfast 2/28/18   Cherelle Graham MD   ibuprofen (MOTRIN) 600 mg tablet Take 1 tablet by mouth every 6 (six) hours as needed for fever for up to 12 doses 10/26/17   Ronny Burden,    ibuprofen (MOTRIN) 600 mg tablet Take 1 tablet (600 mg total) by mouth every 8 (eight) hours 7/26/18   Mary Nguyen MD   lisinopril (ZESTRIL) 5 mg tablet Take 1 tablet (5 mg total) by mouth daily 2/28/18   Cherelle Graham MD   metFORMIN (GLUCOPHAGE) 850 mg tablet Take 1 tablet (850 mg total) by mouth 2 (two) times a day with meals 5/23/18   Kirknell Bairon,    omeprazole (PriLOSEC) 20 mg delayed release capsule Take 1 capsule (20 mg total) by mouth daily 2/28/18   Cherelle Graham MD   omeprazole (PRILOSEC) 20 mg delayed release capsule  11/20/17   Historical Provider, MD           Review of Systems    Review of Systems   Constitutional: Negative for activity change, chills and fever  HENT: Negative for drooling and facial swelling  Eyes: Negative for pain, discharge and visual disturbance  Respiratory: Negative for apnea, cough, chest tightness, shortness of breath and wheezing  Cardiovascular: Negative for chest pain and leg swelling  Gastrointestinal: Positive for abdominal pain, nausea and vomiting  Negative for constipation and diarrhea  Genitourinary: Negative for difficulty urinating, dysuria and urgency  Musculoskeletal: Negative for arthralgias, back pain and gait problem  Skin: Negative for color change and rash  Neurological: Positive for dizziness, numbness and headaches  Negative for speech difficulty and weakness  Psychiatric/Behavioral: Negative for agitation, behavioral problems and confusion  All other systems reviewed and negative  Physical Exam      ED Triage Vitals [02/12/19 0858]   Temperature Pulse Respirations Blood Pressure SpO2   97 7 °F (36 5 °C) 70 20 127/83 95 %      Temp src Heart Rate Source Patient Position - Orthostatic VS BP Location FiO2 (%)   -- Monitor Lying Right arm --      Pain Score       7               Physical Exam   Constitutional: She is oriented to person, place, and time  She appears well-developed and well-nourished  HENT:   Head: Normocephalic and atraumatic  Nose: Nose normal    Mouth/Throat: Oropharynx is clear and moist    Eyes: Pupils are equal, round, and reactive to light  Conjunctivae are normal    Extraocular movements intact bilaterally although dysconjugate gaze noted  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 and her  states that her eyes usually look that was   Neck: Normal range of motion  Neck supple  Cardiovascular: Normal rate, regular rhythm, normal heart sounds and intact distal pulses  Pulmonary/Chest: Effort normal and breath sounds normal  No respiratory distress  She has no wheezes  She has no rales  Abdominal: Soft  She exhibits no distension  There is tenderness  There is no rebound and no guarding  Diffusely tender palpation throughout the abdomen, no rebound or guarding   Musculoskeletal: Normal range of motion  She exhibits tenderness  She exhibits no edema or deformity  Patient tender to palpation at the left leg, states is her baseline due to rheumatoid arthritis, some crepitus with ranging the knee also states that this is baseline   Neurological: She is alert and oriented to person, place, and time  She displays normal reflexes  No cranial nerve deficit or sensory deficit  She exhibits normal muscle tone  Coordination normal    Skin: Skin is warm and dry  No rash noted  No erythema     Psychiatric: She has a normal mood and affect   Her behavior is normal  Judgment and thought content normal             Diagnostic Results      Labs:    Results for orders placed or performed during the hospital encounter of 02/12/19   CBC and differential   Result Value Ref Range    WBC 7 97 4 31 - 10 16 Thousand/uL    RBC 5 25 (H) 3 81 - 5 12 Million/uL    Hemoglobin 15 0 11 5 - 15 4 g/dL    Hematocrit 44 9 34 8 - 46 1 %    MCV 86 82 - 98 fL    MCH 28 6 26 8 - 34 3 pg    MCHC 33 4 31 4 - 37 4 g/dL    RDW 11 7 11 6 - 15 1 %    MPV 10 5 8 9 - 12 7 fL    Platelets 969 231 - 065 Thousands/uL    nRBC 0 /100 WBCs    Neutrophils Relative 70 43 - 75 %    Immat GRANS % 0 0 - 2 %    Lymphocytes Relative 24 14 - 44 %    Monocytes Relative 5 4 - 12 %    Eosinophils Relative 1 0 - 6 %    Basophils Relative 0 0 - 1 %    Neutrophils Absolute 5 56 1 85 - 7 62 Thousands/µL    Immature Grans Absolute 0 01 0 00 - 0 20 Thousand/uL    Lymphocytes Absolute 1 94 0 60 - 4 47 Thousands/µL    Monocytes Absolute 0 39 0 17 - 1 22 Thousand/µL    Eosinophils Absolute 0 05 0 00 - 0 61 Thousand/µL    Basophils Absolute 0 02 0 00 - 0 10 Thousands/µL   Comprehensive metabolic panel   Result Value Ref Range    Sodium 137 136 - 145 mmol/L    Potassium 4 5 3 5 - 5 3 mmol/L    Chloride 101 100 - 108 mmol/L    CO2 28 21 - 32 mmol/L    ANION GAP 8 4 - 13 mmol/L    BUN 16 5 - 25 mg/dL    Creatinine 0 72 0 60 - 1 30 mg/dL    Glucose 330 (H) 65 - 140 mg/dL    Calcium 9 2 8 3 - 10 1 mg/dL    AST 16 5 - 45 U/L    ALT 21 12 - 78 U/L    Alkaline Phosphatase 145 (H) 46 - 116 U/L    Total Protein 7 8 6 4 - 8 2 g/dL    Albumin 3 9 3 5 - 5 0 g/dL    Total Bilirubin 0 40 0 20 - 1 00 mg/dL    eGFR 97 ml/min/1 73sq m   Protime-INR   Result Value Ref Range    Protime 12 2 11 8 - 14 2 seconds    INR 0 91 0 86 - 1 17   APTT   Result Value Ref Range    PTT 25 (L) 26 - 38 seconds   Lipase   Result Value Ref Range    Lipase 150 73 - 393 u/L   Troponin I   Result Value Ref Range    Troponin I <0 02 <=0 04 ng/mL   Fingerstick Glucose (POCT)   Result Value Ref Range    POC Glucose 343 (H) 65 - 140 mg/dl       All labs reviewed and utilized in the medical decision making process    Radiology:    CT head without contrast   Final Result      No CT evidence of an acute intracranial abnormality  Workstation performed: [id number]             All radiology studies independently viewed by me and interpreted by the radiologist     Procedure    Procedures    [facility]      ED Course of Care and Re-Assessments      Imaging reassuring, labs remarkable for hyperglycemia  Vertigo improved substantially with  diazepam as well as headache with Reglan and Benadryl  Medications   sodium chloride 0 9 % bolus 1,000 mL (0 mL Intravenous Stopped 2/12/19 1224)   metoclopramide (REGLAN) injection 10 mg (10 mg Intravenous Given 2/12/19 0939)   diphenhydrAMINE (BENADRYL) injection 25 mg (25 mg Intravenous Given 2/12/19 0937)   diazepam (VALIUM) tablet 5 mg (5 mg Oral Given 2/12/19 1246)   aspirin chewable tablet 324 mg (324 mg Oral Given 2/12/19 1204)           FINAL IMPRESSION    Final diagnoses:   Vertigo   Acute nonintractable headache, unspecified headache type   Vomiting   Hyperglycemia   TIA (transient ischemic attack)         DISPOSITION/PLAN    Symptoms above  Vital signs reassuring, examination remarkable dysconjugate gaze above  Vertigo seems independent of movement  Patient responded well to treatment, labs concerning for significant hyperglycemia  The patient relates history of prior strokes, states that she is not on aspirin or statin, does not currently follow up in neurologist   She is not sure if she has ever had an MRI of her head    At this time, unclear if vertigo was regular central based on history, patient admitted for further care and to facilitate MRI and evaluation with concern for TIA versus stroke as well as to optimize patient for secondary prevention of stroke and potentially optimize glycemic control  Hemodynamically stable at time of admit  Time reflects when diagnosis was documented in both MDM as applicable and the Disposition within this note     Time User Action Codes Description Comment    2/12/2019 11:54 AM Annita Asp Add [R42] Vertigo     2/12/2019 11:54 AM Melly Epstein T Add [R51] Acute nonintractable headache, unspecified headache type     2/12/2019 11:54 AM Melly Epstein T Add [R11 10] Vomiting     2/12/2019 11:54 AM Melly Epstein T Add [R73 9] Hyperglycemia     2/12/2019 11:55 AM Melly Epstein T Add [G45 9] TIA (transient ischemic attack)     2/12/2019 11:56 AM Melly Epstein T Modify [R42] Vertigo     2/12/2019 11:56 AM Melly Epstein T Modify [G45 9] TIA (transient ischemic attack)       ED Disposition     ED Disposition Condition Date/Time Comment    Admit Stable Tue Feb 12, 2019 11:55 AM Case was discussed with JUANITA and the patient's admission status was agreed to be Admission Status: observation status to the service of Dr Emilie Luz          Follow-up Information    None           PATIENT REFERRED TO:    No follow-up provider specified  DISCHARGE MEDICATIONS:    Patient's Medications   Discharge Prescriptions    No medications on file       No discharge procedures on file           MD Higinio Alba MD  02/12/19 5963

## 2019-02-12 NOTE — ED NOTES
1  CC - dizziness  Pt stated that it felt like her vertigo  Also c/o recent high blood glucose levels and high BP  Hx of diabetes and two previous strokes from which she denies any lasting deficits  Admitted b/c of hx for obs and neuro consult  2  Orientation status oriented x4    3  Abnormal labs/ abnormal focused assessment/ abnormal vitals glucose 330 pt c/o headache which since arrival went for 7/10 to 4/10    4  Medications/drips one 1000ML bolus was administered with reglan benadryl and valium followed by 324mg asprin later    5  Last time narcotics given valium at 0920    6  IV lines/drains/etc 20G in right AC    7  Isolation status n/a    8  Skin intact    9  Ambulation was brought in wheelchair but did stand and move to bed with assistance of one  Independent at home    10   ED nurse's phone number [phone] Dyllan Li RN  02/12/19 5784

## 2019-02-12 NOTE — PLAN OF CARE
Problem: PAIN - ADULT  Goal: Verbalizes/displays adequate comfort level or baseline comfort level  Description  Interventions:  - Encourage patient to monitor pain and request assistance  - Assess pain using appropriate pain scale  - Administer analgesics based on type and severity of pain and evaluate response  - Implement non-pharmacological measures as appropriate and evaluate response  - Consider cultural and social influences on pain and pain management  - Notify physician/advanced practitioner if interventions unsuccessful or patient reports new pain  Outcome: Progressing     Problem: INFECTION - ADULT  Goal: Absence or prevention of progression during hospitalization  Description  INTERVENTIONS:  - Assess and monitor for signs and symptoms of infection  - Monitor lab/diagnostic results  - Monitor all insertion sites, i e  indwelling lines, tubes, and drains  - Monitor endotracheal (as able) and nasal secretions for changes in amount and color  - Artemas appropriate cooling/warming therapies per order  - Administer medications as ordered  - Instruct and encourage patient and family to use good hand hygiene technique  - Identify and instruct in appropriate isolation precautions for identified infection/condition  Outcome: Progressing  Goal: Absence of fever/infection during neutropenic period  Description  INTERVENTIONS:  - Monitor WBC  - Implement neutropenic guidelines  Outcome: Progressing     Problem: SAFETY ADULT  Goal: Patient will remain free of falls  Description  INTERVENTIONS:  - Assess patient frequently for physical needs  -  Identify cognitive and physical deficits and behaviors that affect risk of falls    -  Artemas fall precautions as indicated by assessment   - Educate patient/family on patient safety including physical limitations  - Instruct patient to call for assistance with activity based on assessment  - Modify environment to reduce risk of injury  - Consider OT/PT consult to assist with strengthening/mobility  Outcome: Progressing  Goal: Maintain or return to baseline ADL function  Description  INTERVENTIONS:  -  Assess patient's ability to carry out ADLs; assess patient's baseline for ADL function and identify physical deficits which impact ability to perform ADLs (bathing, care of mouth/teeth, toileting, grooming, dressing, etc )  - Assess/evaluate cause of self-care deficits   - Assess range of motion  - Assess patient's mobility; develop plan if impaired  - Assess patient's need for assistive devices and provide as appropriate  - Encourage maximum independence but intervene and supervise when necessary  ¯ Involve family in performance of ADLs  ¯ Assess for home care needs following discharge   ¯ Request OT consult to assist with ADL evaluation and planning for discharge  ¯ Provide patient education as appropriate  Outcome: Progressing  Goal: Maintain or return mobility status to optimal level  Description  INTERVENTIONS:  - Assess patient's baseline mobility status (ambulation, transfers, stairs, etc )    - Identify cognitive and physical deficits and behaviors that affect mobility  - Identify mobility aids required to assist with transfers and/or ambulation (gait belt, sit-to-stand, lift, walker, cane, etc )  - [place] fall precautions as indicated by assessment  - Record patient progress and toleration of activity level on Mobility SBAR; progress patient to next Phase/Stage  - Instruct patient to call for assistance with activity based on assessment  - Request Rehabilitation consult to assist with strengthening/weightbearing, etc   Outcome: Progressing     Problem: DISCHARGE PLANNING  Goal: Discharge to home or other facility with appropriate resources  Description  INTERVENTIONS:  - Identify barriers to discharge w/patient and caregiver  - Arrange for needed discharge resources and transportation as appropriate  - Identify discharge learning needs (meds, wound care, etc )  - Arrange for interpretive services to assist at discharge as needed  - Refer to Case Management Department for coordinating discharge planning if the patient needs post-hospital services based on physician/advanced practitioner order or complex needs related to functional status, cognitive ability, or social support system  Outcome: Progressing     Problem: Knowledge Deficit  Goal: Patient/family/caregiver demonstrates understanding of disease process, treatment plan, medications, and discharge instructions  Description  Complete learning assessment and assess knowledge base    Interventions:  - Provide teaching at level of understanding  - Provide teaching via preferred learning methods  Outcome: Progressing

## 2019-02-13 VITALS
HEIGHT: 63 IN | WEIGHT: 132.28 LBS | DIASTOLIC BLOOD PRESSURE: 65 MMHG | TEMPERATURE: 98.1 F | SYSTOLIC BLOOD PRESSURE: 115 MMHG | HEART RATE: 68 BPM | OXYGEN SATURATION: 96 % | RESPIRATION RATE: 18 BRPM | BODY MASS INDEX: 23.44 KG/M2

## 2019-02-13 LAB
ANION GAP SERPL CALCULATED.3IONS-SCNC: 8 MMOL/L (ref 4–13)
ATRIAL RATE: 60 BPM
BUN SERPL-MCNC: 16 MG/DL (ref 5–25)
CALCIUM SERPL-MCNC: 8.5 MG/DL (ref 8.3–10.1)
CHLORIDE SERPL-SCNC: 104 MMOL/L (ref 100–108)
CO2 SERPL-SCNC: 26 MMOL/L (ref 21–32)
CREAT SERPL-MCNC: 0.62 MG/DL (ref 0.6–1.3)
ERYTHROCYTE [DISTWIDTH] IN BLOOD BY AUTOMATED COUNT: 11.8 % (ref 11.6–15.1)
EST. AVERAGE GLUCOSE BLD GHB EST-MCNC: 283 MG/DL
GFR SERPL CREATININE-BSD FRML MDRD: 104 ML/MIN/1.73SQ M
GLUCOSE SERPL-MCNC: 232 MG/DL (ref 65–140)
GLUCOSE SERPL-MCNC: 268 MG/DL (ref 65–140)
GLUCOSE SERPL-MCNC: 268 MG/DL (ref 65–140)
HBA1C MFR BLD: 11.5 % (ref 4.2–6.3)
HCT VFR BLD AUTO: 40.5 % (ref 34.8–46.1)
HGB BLD-MCNC: 13.6 G/DL (ref 11.5–15.4)
MCH RBC QN AUTO: 29.2 PG (ref 26.8–34.3)
MCHC RBC AUTO-ENTMCNC: 33.6 G/DL (ref 31.4–37.4)
MCV RBC AUTO: 87 FL (ref 82–98)
P AXIS: 59 DEGREES
PLATELET # BLD AUTO: 147 THOUSANDS/UL (ref 149–390)
PMV BLD AUTO: 10.4 FL (ref 8.9–12.7)
POTASSIUM SERPL-SCNC: 3.8 MMOL/L (ref 3.5–5.3)
PR INTERVAL: 142 MS
QRS AXIS: 46 DEGREES
QRSD INTERVAL: 86 MS
QT INTERVAL: 440 MS
QTC INTERVAL: 440 MS
RBC # BLD AUTO: 4.65 MILLION/UL (ref 3.81–5.12)
SODIUM SERPL-SCNC: 138 MMOL/L (ref 136–145)
T WAVE AXIS: 73 DEGREES
VENTRICULAR RATE: 60 BPM
WBC # BLD AUTO: 5.07 THOUSAND/UL (ref 4.31–10.16)

## 2019-02-13 PROCEDURE — 85027 COMPLETE CBC AUTOMATED: CPT | Performed by: STUDENT IN AN ORGANIZED HEALTH CARE EDUCATION/TRAINING PROGRAM

## 2019-02-13 PROCEDURE — G8978 MOBILITY CURRENT STATUS: HCPCS

## 2019-02-13 PROCEDURE — 99217 PR OBSERVATION CARE DISCHARGE MANAGEMENT: CPT | Performed by: INTERNAL MEDICINE

## 2019-02-13 PROCEDURE — 82948 REAGENT STRIP/BLOOD GLUCOSE: CPT

## 2019-02-13 PROCEDURE — 93010 ELECTROCARDIOGRAM REPORT: CPT | Performed by: INTERNAL MEDICINE

## 2019-02-13 PROCEDURE — 80048 BASIC METABOLIC PNL TOTAL CA: CPT | Performed by: STUDENT IN AN ORGANIZED HEALTH CARE EDUCATION/TRAINING PROGRAM

## 2019-02-13 PROCEDURE — G8979 MOBILITY GOAL STATUS: HCPCS

## 2019-02-13 PROCEDURE — 97116 GAIT TRAINING THERAPY: CPT

## 2019-02-13 PROCEDURE — 83036 HEMOGLOBIN GLYCOSYLATED A1C: CPT | Performed by: STUDENT IN AN ORGANIZED HEALTH CARE EDUCATION/TRAINING PROGRAM

## 2019-02-13 PROCEDURE — 97163 PT EVAL HIGH COMPLEX 45 MIN: CPT

## 2019-02-13 RX ORDER — MECLIZINE HCL 12.5 MG/1
12.5 TABLET ORAL EVERY 8 HOURS PRN
Qty: 30 TABLET | Refills: 0 | Status: SHIPPED | OUTPATIENT
Start: 2019-02-13 | End: 2019-03-08

## 2019-02-13 RX ADMIN — ASPIRIN 325 MG: 325 TABLET ORAL at 08:30

## 2019-02-13 RX ADMIN — INSULIN LISPRO 2 UNITS: 100 INJECTION, SOLUTION INTRAVENOUS; SUBCUTANEOUS at 14:00

## 2019-02-13 RX ADMIN — INSULIN LISPRO 2 UNITS: 100 INJECTION, SOLUTION INTRAVENOUS; SUBCUTANEOUS at 08:31

## 2019-02-13 RX ADMIN — ENOXAPARIN SODIUM 40 MG: 40 INJECTION SUBCUTANEOUS at 08:30

## 2019-02-13 RX ADMIN — PANTOPRAZOLE SODIUM 40 MG: 40 TABLET, DELAYED RELEASE ORAL at 06:18

## 2019-02-13 RX ADMIN — OXYCODONE HYDROCHLORIDE AND ACETAMINOPHEN 500 MG: 500 TABLET ORAL at 08:30

## 2019-02-13 RX ADMIN — DOCUSATE SODIUM 100 MG: 100 CAPSULE, LIQUID FILLED ORAL at 08:30

## 2019-02-13 NOTE — PLAN OF CARE
Problem: PHYSICAL THERAPY ADULT  Goal: Performs mobility at highest level of function for planned discharge setting  See evaluation for individualized goals  Description  Treatment/Interventions: Functional transfer training, LE strengthening/ROM, Elevations, Therapeutic exercise, Endurance training, Patient/family training, Equipment eval/education, Bed mobility, Gait training, Spoke to nursing, Family          See flowsheet documentation for full assessment, interventions and recommendations  Outcome: Progressing  Note:   Prognosis: Good  Problem List: Decreased endurance, Impaired balance, Decreased mobility, Decreased strength, Pain, Impaired sensation  Assessment: Pt seen for PT treatment session this date with interventions consisting of gait training w/ emphasis on improving pt's ability to ambulate level surfaces x 15 ft x 2 with distant S provided by therapist with RW, therapeutic activity consisting of training: supine<>sit transfers, sit<>stand transfers and vc and tactile cues for static standing posture faciliation and navigating 3 stairs w/ B handrail with step to pattern with SBA  Pt agreeable to PT treatment session upon arrival, pt found supine in bed w/ HOB elevated, in no apparent distress, A&O x 4 and responsive  In comparison to previous session, pt with improvements in tolerating greater OOB mobility and elevation training  Post session: pt returned back to recliner, all needs in reach and RN notified of session findings/recommendations  Continue to recommend Home PT with family support at time of d/c in order to maximize pt's functional independence and safety w/ mobility  Pt continues to be functioning below baseline level, and remains limited 2* factors listed above  PT will continue to see pt while here in order to address the deficits listed above and provide interventions consistent w/ POC in effort to achieve STGs    Barriers to Discharge: None  Barriers to Discharge Comments: 4-5 ARLET c bilateral rails  Recommendation: Home PT, Home with family support(With transition to OP PT (with vestibular emphasis) )     PT - OK to Discharge: Yes(when medically cleared)    See flowsheet documentation for full assessment

## 2019-02-13 NOTE — PHYSICAL THERAPY NOTE
Physical Therapy Treatment Note       02/13/19 0816   Pain Assessment   Pain Assessment 0-10   Pain Score 5  (pt reporting 3/10 pain post-session)   Pain Type Chronic pain   Pain Location Leg;Knee   Pain Orientation Bilateral   Pain Descriptors Aching   Pain Frequency Intermittent   Pain Onset Gradual   Clinical Progression Not changed   Hospital Pain Intervention(s) Repositioned; Ambulation/increased activity   Restrictions/Precautions   Weight Bearing Precautions Per Order No   Braces or Orthoses Other (Comment)  (none per patient)   Other Precautions Fall Risk;Pain;Multiple lines   General   Chart Reviewed Yes   Additional Pertinent History Pt seen for PT treatment session immediately following PT evaluation   Response to Previous Treatment Patient with no complaints from previous session  Family/Caregiver Present Yes  (Spouse)   Cognition   Overall Cognitive Status WFL   Arousal/Participation Cooperative   Attention Within functional limits   Orientation Level Oriented X4   Memory Within functional limits   Following Commands Follows all commands and directions without difficulty   Comments Pt agreeable to participate in skilled therapy services   Subjective   Subjective "I will have my son help me "   Bed Mobility   Supine to Sit 5  Supervision  (x 2 trials)   Additional items Assist x 1; Increased time required;Verbal cues   Sit to Supine 5  Supervision  (x 2 trials - VC's for appropriate technique)   Additional items Assist x 1; Increased time required;Verbal cues   Transfers   Sit to Stand 6  Modified independent   Additional items Increased time required   Stand to Sit 5  Supervision   Additional items Increased time required;Assist x 1;Verbal cues   Ambulation/Elevation   Gait pattern Excessively slow;Decreased foot clearance; Step to;Short stride   Gait Assistance 5  Supervision  (SBA; 1 minor LOB; pt reporting episode of dizziness requiring CGA for enhanced safety/fall prevention)   Additional items Assist x 1;Verbal cues   Assistive Device Rolling walker   Distance 15' x 2   Stair Management Assistance   (SBA-CGA; pt's  able to demonstrate appropriate guarding technique for enhanced safety/fall prevention)   Additional items Assist x 1;Verbal cues; Increased time required   Stair Management Technique Step to pattern; With walker  (b/l UE support on walker)   Number of Stairs 3  (at 4" practice step)   Balance   Static Sitting Good   Dynamic Sitting Good   Static Standing Fair +   Dynamic Standing Fair   Ambulatory Fair -   Endurance Deficit   Endurance Deficit Yes   Endurance Deficit Description Chronic pain secondary to history of osteoarthritis and rheumatoid arthritis; general deconditioning; history of vertigo   Activity Tolerance   Activity Tolerance Patient limited by fatigue;Treatment limited secondary to medical complications (Comment)  (Mild dizziness upon navigating turns)   Nurse Constanza Villagomez, RN made aware of session outcomes   Assessment   Prognosis Good   Problem List Decreased endurance; Impaired balance;Decreased mobility; Decreased strength;Pain; Impaired sensation   Assessment Pt seen for PT treatment session this date with interventions consisting of gait training w/ emphasis on improving pt's ability to ambulate level surfaces x 15 ft x 2 with SBA provided by therapist (1 minor LOB, patient requiring CGA for enhanced safety/fall prevention secondary to episode of dizziness) with RW, therapeutic activity consisting of training: supine<>sit transfers and sit<>stand transfers and navigating 3 stairs at 4" practice step w/ b/l UE support on walker with SBA-CGA  Pt agreeable to participate with PT services, pt found supine in bed w/ HOB elevated, in no apparent distress, A&O x 4 and responsive   In comparison to previous session, pt with improvements in initiation of stair training; provided patient/family education on safety on stairs including appropriate guarding technique; patient's  able to demonstrate appropriate guarding technique (CGA) for enhanced safety/fall prevention on stairs  Post session: pt returned BTB, all needs in reach and RN notified of session findings/recommendations  Continue to recommend Home PT with family support (With transition to OP PT (with vestibular emphasis) at time of d/c in order to maximize pt's functional independence and safety w/ mobility  Pt continues to be functioning below baseline level, and remains limited 2* factors listed above  PT will continue to see pt while here in order to address the deficits listed above and provide interventions consistent w/ POC in effort to achieve STGs  Barriers to Discharge None   Goals   Patient Goals to return home   STG Expiration Date 02/20/19   Short Term Goal #1 STGs remain appropriate   Treatment Day 1   Plan   Treatment/Interventions Functional transfer training;LE strengthening/ROM; Elevations; Therapeutic exercise; Endurance training;Patient/family training;Equipment eval/education; Bed mobility;Gait training;Spoke to nursing;Family;Spoke to case management   Progress Slow progress, decreased activity tolerance   PT Frequency 5x/wk   Recommendation   Recommendation Home PT; Home with family support  (With transition to OP PT (with vestibular emphasis) )   PT - OK to Discharge Yes  (when medically cleared)       Gaby Chang, PT, DPT    Time of PT treatment session: 08:00-08:16  16 minutes

## 2019-02-13 NOTE — PLAN OF CARE
Problem: PHYSICAL THERAPY ADULT  Goal: Performs mobility at highest level of function for planned discharge setting  See evaluation for individualized goals  Description  Treatment/Interventions: Functional transfer training, LE strengthening/ROM, Elevations, Therapeutic exercise, Endurance training, Patient/family training, Equipment eval/education, Bed mobility, Gait training, Spoke to nursing, Family          See flowsheet documentation for full assessment, interventions and recommendations  2/13/2019 1134 by Bridger Douglas, PT  Outcome: Progressing  Note:   Prognosis: Good  Problem List: Decreased endurance, Impaired balance, Decreased mobility, Decreased strength, Pain, Impaired sensation  Assessment: Pt seen for PT treatment session this date with interventions consisting of gait training w/ emphasis on improving pt's ability to ambulate level surfaces x 15 ft x 2 with SBA provided by therapist (1 minor LOB, patient requiring CGA for enhanced safety/fall prevention secondary to episode of dizziness) with RW, therapeutic activity consisting of training: supine<>sit transfers and sit<>stand transfers and navigating 3 stairs at 4" practice step w/ b/l UE support on walker with SBA-CGA  Pt agreeable to participate with PT services, pt found supine in bed w/ HOB elevated, in no apparent distress, A&O x 4 and responsive  In comparison to previous session, pt with improvements in initiation of stair training; provided patient/family education on safety on stairs including appropriate guarding technique; patient's  able to demonstrate appropriate guarding technique (CGA) for enhanced safety/fall prevention on stairs  Post session: pt returned BTB, all needs in reach and RN notified of session findings/recommendations   Continue to recommend Home PT with family support (With transition to OP PT (with vestibular emphasis) at time of d/c in order to maximize pt's functional independence and safety w/ mobility  Pt continues to be functioning below baseline level, and remains limited 2* factors listed above  PT will continue to see pt while here in order to address the deficits listed above and provide interventions consistent w/ POC in effort to achieve STGs  Barriers to Discharge: None  Barriers to Discharge Comments: 4-5 ARLET c bilateral rails  Recommendation: Home PT, Home with family support(With transition to OP PT (with vestibular emphasis) )     PT - OK to Discharge: Yes(when medically cleared)    See flowsheet documentation for full assessment

## 2019-02-13 NOTE — UTILIZATION REVIEW
Initial Clinical Review    Admission: Date/Time/Statement:  OBS  2/12  1156  Orders Placed This Encounter   Procedures    Place in Observation     Standing Status:   Standing     Number of Occurrences:   1     Order Specific Question:   Admitting Physician     Answer:   Maria A Carrasco [[id number]]     Order Specific Question:   Level of Care     Answer:   Med Surg [16]     ED: Date/Time/Mode of Arrival:   ED Arrival Information     Expected Arrival Acuity Means of Arrival Escorted By Service Admission Type    - 2/12/2019 08:43 Urgent Walk-In Family Member General Medicine Urgent    Arrival Complaint    DIZZINESS        Chief Complaint:   Chief Complaint   Patient presents with    Vertigo - Recurrent     pt states that she has had vertigo before but this morning the dizziness and headache was alot worse than normal  pt c/o of her arms and legs being numb     History of Illness: 48 yo female to ED from home w/ c /o vertigo  This morning assoc w/ non bloody vomiting    Initially also c/ o HA and numbness both arms and legs   ED Vital Signs:   ED Triage Vitals   Temperature Pulse Respirations Blood Pressure SpO2   02/12/19 0858 02/12/19 0858 02/12/19 0858 02/12/19 0858 02/12/19 0858   97 7 °F (36 5 °C) 70 20 127/83 95 %      Temp Source Heart Rate Source Patient Position - Orthostatic VS BP Location FiO2 (%)   02/12/19 1355 02/12/19 0858 02/12/19 0858 02/12/19 0858 --   Oral Monitor Lying Right arm       Pain Score       02/12/19 0858       7        Wt Readings from Last 1 Encounters:   02/12/19 60 kg (132 lb 4 4 oz)     Vital Signs (abnormal):   wnl   Pertinent Labs/Diagnostic Test Results: gluc  330, alk phos  145  CT head- wnl   ED Treatment:   Medication Administration from 02/12/2019 0843 to 02/12/2019 1337       Date/Time Order Dose Route Action Action by Comments     02/12/2019 1224 sodium chloride 0 9 % bolus 1,000 mL 0 mL Intravenous Stopped Jerome Bustos RN      02/12/2019 0943 sodium chloride 0 9 % bolus 1,000 mL 1,000 mL Intravenous New Bag Keara Zabalatavia Escalante, [facility]      02/12/2019 0518 metoclopramide (REGLAN) injection 10 mg 10 mg Intravenous Given Marilu Olguin, RN      02/12/2019 2786 diphenhydrAMINE (BENADRYL) injection 25 mg 25 mg Intravenous Given Marilu Olguin, RN      02/12/2019 8333 diazepam (VALIUM) tablet 5 mg 5 mg Oral Given Keara Escalante RN      02/12/2019 1204 aspirin chewable tablet 324 mg 324 mg Oral Given Pamela Fernandez RN         Past Medical/Surgical History: Active Ambulatory Problems     Diagnosis Date Noted    Gastritis 02/03/2017    GERD (gastroesophageal reflux disease) 02/03/2017    Colon polyp 02/03/2017    Ventral hernia with obstruction and without gangrene 41/07/9528    Umbilical hernia without obstruction and without gangrene 03/17/2017    Uncontrolled type 2 diabetes mellitus, without long-term current use of insulin ([facility] [address] ) 12/21/2016    Dyslipidemia 04/24/2017     Past Medical History:   Diagnosis Date    Arthritis     Colonic polyp     Diabetes mellitus ([facility] [address] )     Dry eye syndrome of bilateral lacrimal glands     GERD (gastroesophageal reflux disease)     History of screening mammography     Hypertension     Lyme disease     Osteoarthritis     RA (rheumatoid arthritis) ([facility] [address] )     Rheumatoid arteritis     Rheumatoid arthritis ([facility] [address] )     Stroke ([facility] [address] )     Symptomatic hypotension     Ventral hernia with obstruction and without gangrene     Vertigo      Admitting Diagnosis: TIA (transient ischemic attack) [G45 9]  Vertigo [R42]  Vomiting [R11 10]  Hyperglycemia [R73 9]  Recurrent vertigo [R42]  Acute nonintractable headache, unspecified headache type [R51]  Age/Sex: 47 y o  female  Assessment/Plan: Vertigo  Assessment & Plan  History of chronic vertigo presents to ER complaining of worsening of symptoms  Patient reports that vertigo resolves when she takes diphenhydramine at home    Reports she ran out of diphenhydramine and woke up this morning with worsening of vertigo  She describes vertigo as room spinning around  No significant lab abnormalities noted other than uncontrolled glucose  CT head negative for any acute abnormalities    Patient did receive Benadryl in ER  Currently reports vertigo has resolved  Denies chest pain, dyspnea, fever, palpitation, headache, loss of consciousness, chills, blurry vision, tinnitus, recent illness, nausea, vomiting, pain, dysuria, diarrhea, focal neurological complaints  Denies any new complaints  Reports feeling much better   Sarah Pierce due to BPPV   Continue tele monitoring  Orthostatic vital monitoring  Meclizine p r n  PT eval  Continue supportive care    Osteoarthritis  Assessment & Plan  Reported history of osteoarthritis takes NSAIDs  Limit use of NSAIDs  PT eval   Dyslipidemia  Assessment & Plan  Continue meds   Uncontrolled type 2 diabetes mellitus, without long-term current use of insulin [facility])  Assessment & Plan        Lab Results   Component Value Date     HGBA1C 8 4 08/11/2018          Recent Labs     02/12/19  0941   POCGLU 343*    Blood Sugar Average: Last 72 hrs:  (P) 343    Most recent A1c was in August 8 4  Follow-up A1c  Patient is on metformin and Amaryl  Patient reports that she has not been taking metformin because it makes her stomach upset  Noted sugars in 300s  Will hold p o   Agents  Diabetic diet  Sliding scale coverage  Inpatient goal should be 140-180   GERD (gastroesophageal reflux disease)  Assessment & Plan  History of GERD on PPI  Currently stable  Continue home medication      Admission Orders:  Scheduled Meds:   Current Facility-Administered Medications:  acetaminophen 650 mg Oral Q6H PRN    ascorbic acid 500 mg Oral Daily    aspirin 325 mg Oral Daily    atorvastatin 10 mg Oral Daily    dicyclomine 20 mg Oral TID PRN    docusate sodium 100 mg Oral BID    enoxaparin 40 mg Subcutaneous Q24H NITZA    insulin lispro 1-5 Units Subcutaneous TID AC    insulin lispro 1-5 Units Subcutaneous HS lisinopril 5 mg Oral Daily    meclizine 12 5 mg Oral Q8H PRN    pantoprazole 40 mg Oral Early Morning      Up w/ assist   Cons carb diet   PT eval   Tele  Act as zahra   Orthostatic BP   Fingerstick ac and hs   2/13 cbc  Bmp  plt   147  Gluc  232

## 2019-02-13 NOTE — PLAN OF CARE
Problem: PAIN - ADULT  Goal: Verbalizes/displays adequate comfort level or baseline comfort level  Description  Interventions:  - Encourage patient to monitor pain and request assistance  - Assess pain using appropriate pain scale  - Administer analgesics based on type and severity of pain and evaluate response  - Implement non-pharmacological measures as appropriate and evaluate response  - Consider cultural and social influences on pain and pain management  - Notify physician/advanced practitioner if interventions unsuccessful or patient reports new pain  2/13/2019 1356 by Cecilia Currie RN  Outcome: Progressing  2/13/2019 1355 by Cecilia Currie RN  Outcome: Progressing     Problem: INFECTION - ADULT  Goal: Absence or prevention of progression during hospitalization  Description  INTERVENTIONS:  - Assess and monitor for signs and symptoms of infection  - Monitor lab/diagnostic results  - Monitor all insertion sites, i e  indwelling lines, tubes, and drains  - Monitor endotracheal (as able) and nasal secretions for changes in amount and color  - [place] appropriate cooling/warming therapies per order  - Administer medications as ordered  - Instruct and encourage patient and family to use good hand hygiene technique  - Identify and instruct in appropriate isolation precautions for identified infection/condition  2/13/2019 1356 by Cecilia Currie RN  Outcome: Progressing  2/13/2019 1355 by Cecilia Currie RN  Outcome: Progressing  Goal: Absence of fever/infection during neutropenic period  Description  INTERVENTIONS:  - Monitor WBC  - Implement neutropenic guidelines  2/13/2019 1356 by Cecilia Currie RN  Outcome: Progressing  2/13/2019 1355 by Cecilia Currie RN  Outcome: Progressing     Problem: SAFETY ADULT  Goal: Patient will remain free of falls  Description  INTERVENTIONS:  - Assess patient frequently for physical needs  -  Identify cognitive and physical deficits and behaviors that affect risk of falls   -  [place] fall precautions as indicated by assessment   - Educate patient/family on patient safety including physical limitations  - Instruct patient to call for assistance with activity based on assessment  - Modify environment to reduce risk of injury  - Consider OT/PT consult to assist with strengthening/mobility  2/13/2019 1356 by Gerald Vigil RN  Outcome: Progressing  2/13/2019 1355 by Gerald Vigil RN  Outcome: Progressing  Goal: Maintain or return to baseline ADL function  Description  INTERVENTIONS:  -  Assess patient's ability to carry out ADLs; assess patient's baseline for ADL function and identify physical deficits which impact ability to perform ADLs (bathing, care of mouth/teeth, toileting, grooming, dressing, etc )  - Assess/evaluate cause of self-care deficits   - Assess range of motion  - Assess patient's mobility; develop plan if impaired  - Assess patient's need for assistive devices and provide as appropriate  - Encourage maximum independence but intervene and supervise when necessary  ¯ Involve family in performance of ADLs  ¯ Assess for home care needs following discharge   ¯ Request OT consult to assist with ADL evaluation and planning for discharge  ¯ Provide patient education as appropriate  2/13/2019 1356 by Gerald Vigil RN  Outcome: Progressing  2/13/2019 1355 by Gerald Vigil RN  Outcome: Progressing  Goal: Maintain or return mobility status to optimal level  Description  INTERVENTIONS:  - Assess patient's baseline mobility status (ambulation, transfers, stairs, etc )    - Identify cognitive and physical deficits and behaviors that affect mobility  - Identify mobility aids required to assist with transfers and/or ambulation (gait belt, sit-to-stand, lift, walker, cane, etc )  - [place] fall precautions as indicated by assessment  - Record patient progress and toleration of activity level on Mobility SBAR; progress patient to next Phase/Stage  - Instruct patient to call for assistance with activity based on assessment  - Request Rehabilitation consult to assist with strengthening/weightbearing, etc   2/13/2019 1356 by César Partida RN  Outcome: Progressing  2/13/2019 1355 by César Partida RN  Outcome: Progressing     Problem: DISCHARGE PLANNING  Goal: Discharge to home or other facility with appropriate resources  Description  INTERVENTIONS:  - Identify barriers to discharge w/patient and caregiver  - Arrange for needed discharge resources and transportation as appropriate  - Identify discharge learning needs (meds, wound care, etc )  - Arrange for interpretive services to assist at discharge as needed  - Refer to Case Management Department for coordinating discharge planning if the patient needs post-hospital services based on physician/advanced practitioner order or complex needs related to functional status, cognitive ability, or social support system  2/13/2019 1356 by César Partida RN  Outcome: Progressing  2/13/2019 1355 by César Partida RN  Outcome: Progressing     Problem: Knowledge Deficit  Goal: Patient/family/caregiver demonstrates understanding of disease process, treatment plan, medications, and discharge instructions  Description  Complete learning assessment and assess knowledge base  Interventions:  - Provide teaching at level of understanding  - Provide teaching via preferred learning methods  2/13/2019 1356 by César Partida RN  Outcome: Progressing  2/13/2019 1355 by César Partida RN  Outcome: Progressing     Problem: DISCHARGE PLANNING - CARE MANAGEMENT  Goal: Discharge to post-acute care or home with appropriate resources  Description  PT rec home PT  Cm met w pt and spouse  Pt has no POA or AD, but denies need for resources  1 story home  1 step to enter  Pt ind w adls [facility] RXs  Denies issues obtaining medications  Pt agreeable to [facility]  [facility] to [facility]  Referral sent and pt accepted   Pt does not need rw, has one at home  Has ride home at dc  Cm to follow    CM reviewed discharge planning process including the following: identifying help at home, patient preference for discharge planning needs, pharmacy preference, and availability of treatment team to discuss questions or concerns patient and/or family may have regarding understanding medications and recognizing signs and symptoms once discharged  CM also encouraged patient to follow up with all recommended appointments after discharge  Patient advised of importance for patient and family to participate in managing patient's medical well being  CM name and role reviewed  Discharge Checklist reviewed and CM will continue to monitor for progress toward discharge goals in nursing and provider rounds      INTERVENTIONS:  - Conduct assessment to determine patient/family and health care team treatment goals, and need for post-acute services based on payer coverage, community resources, and patient preferences, and barriers to discharge  - Address psychosocial, clinical, and financial barriers to discharge as identified in assessment in conjunction with the patient/family and health care team  - Arrange appropriate level of post-acute services according to patient's   needs and preference and payer coverage in collaboration with the physician and health care team  - Communicate with and update the patient/family, physician, and health care team regarding progress on the discharge plan  - Arrange appropriate transportation to post-acute venues   2/13/2019 1356 by Dhruv Pugh, RN  Outcome: Progressing  2/13/2019 1355 by Dhruv Pugh, RN  Outcome: Progressing

## 2019-02-13 NOTE — PLAN OF CARE
Problem: PHYSICAL THERAPY ADULT  Goal: Performs mobility at highest level of function for planned discharge setting  See evaluation for individualized goals  Description  Treatment/Interventions: Functional transfer training, LE strengthening/ROM, Elevations, Therapeutic exercise, Endurance training, Patient/family training, Equipment eval/education, Bed mobility, Gait training, Spoke to nursing, Family          See flowsheet documentation for full assessment, interventions and recommendations  Note:   Prognosis: Good  Problem List: Decreased endurance, Impaired balance, Decreased mobility, Decreased strength, Pain, Impaired sensation  Assessment: Pt is 47 y o  female seen for PT evaluation s/p admit to [facility] on 2019 w/ Vertigo  PT consulted to assess pt's functional mobility and d/c needs  Order placed for PT eval and tx, w/ up w/ A order  Performed at least 2 patient identifiers during session: Name and   Comorbidities affecting pt's physical performance at time of assessment include: DM, GERD, HTN, Lyme disease, osteoarthritis, rheumatoid arthritis, stroke, symptomatic hypotension, vertigo, ventral hernia with obstruction and without gangrene  PTA, pt was independent w/ all functional mobility w/ cane (intermittent use), ambulates household distances, has 4-5 ARLET, lives w/ family in one level house and unemployed  Personal factors affecting pt at time of IE include: inaccessible home environment, stairs to enter home, positive fall history and inability to perform IADLs  Please find objective findings from PT assessment regarding body systems outlined above with impairments and limitations including impaired balance, decreased endurance, gait deviations, pain, decreased activity tolerance, decreased functional mobility tolerance, altered sensation and fall risk   The following objective measures performed on IE also reveal limitations: Barthel Index: 55/100 and Modified [place]: 3 (moderate disability)  Pt's clinical presentation is currently unstable/unpredictable seen in pt's presentation of ongoing medical management/monitoring, pain impacting overall mobility status and need for input for mobility technique/safety  Pt to benefit from continued PT tx to address deficits as defined above and maximize level of functional independent mobility and consistency  From PT/mobility standpoint, recommendation at time of d/c would be Home PT with family support pending progress in order to facilitate return to PLOF  Barriers to Discharge: Inaccessible home environment  Barriers to Discharge Comments: 4-5 ARLET c bilateral rails  Recommendation: Home PT, Home with family support(With transition to OP PT (with vestibular emphasis) )     PT - OK to Discharge: No    See flowsheet documentation for full assessment

## 2019-02-13 NOTE — PHYSICAL THERAPY NOTE
Physical Therapy Treatment Note       02/13/19 1205   Pain Assessment   Pain Assessment 0-10   Pain Score No Pain   Restrictions/Precautions   Weight Bearing Precautions Per Order No   Braces or Orthoses Other (Comment)  (none per patient)   Other Precautions Fall Risk;Multiple lines   General   Chart Reviewed Yes   Response to Previous Treatment Patient with no complaints from previous session  Family/Caregiver Present Yes  (Spouse)   Cognition   Overall Cognitive Status WFL   Arousal/Participation Cooperative   Attention Within functional limits   Orientation Level Oriented X4   Memory Within functional limits   Following Commands Follows all commands and directions without difficulty   Comments Pt agreeable to participate in skilled therapy services   Subjective   Subjective "I feel kind of clammy"   Bed Mobility   Rolling L 5  Supervision   Additional items Assist x 1; Increased time required;Verbal cues   Supine to Sit 5  Supervision   Additional items Assist x 1;HOB elevated; Bedrails; Increased time required;Verbal cues   Transfers   Sit to Stand 5  Supervision   Additional items Assist x 1;Verbal cues  (for proper RW use)   Stand to Sit 5  Supervision   Additional items Assist x 1;Verbal cues   Additional Comments SpO2 on RA = 96%, HR = 68 bpm after elevation trial   Ambulation/Elevation   Gait pattern Excessively slow;Decreased foot clearance; Step to;Short stride   Gait Assistance 5  Supervision   Additional items Assist x 1;Verbal cues   Assistive Device Rolling walker   Distance 15' x 2   Stair Management Assistance 5  Supervision   Additional items Assist x 1;Verbal cues   Stair Management Technique Two rails; Step to pattern; Foreward   Number of Stairs 3  ((+) clamminess during elevation trial)   Balance   Static Sitting Good   Dynamic Sitting Good   Static Standing Fair +   Dynamic Standing Fair   Ambulatory Fair -   Endurance Deficit   Endurance Deficit Yes   Activity Tolerance   Activity Tolerance Patient limited by fatigue;Treatment limited secondary to medical complications (Comment)  (Mild dizziness)   Nurse Constanza eLe, RN verbalized patient appropriate for PT session   Assessment   Prognosis Good   Problem List Decreased endurance; Impaired balance;Decreased mobility; Decreased strength;Pain; Impaired sensation   Assessment Pt seen for PT treatment session this date with interventions consisting of gait training w/ emphasis on improving pt's ability to ambulate level surfaces x 15 ft x 2 with distant S provided by therapist with RW, therapeutic activity consisting of training: supine<>sit transfers, sit<>stand transfers and vc and tactile cues for static standing posture faciliation and navigating 3 stairs w/ B handrail with step to pattern with SBA  Pt agreeable to PT treatment session upon arrival, pt found supine in bed w/ HOB elevated, in no apparent distress, A&O x 4 and responsive  In comparison to previous session, pt with improvements in tolerating greater OOB mobility and elevation training  Post session: pt returned back to recliner, all needs in reach and RN notified of session findings/recommendations  Continue to recommend Home PT with family support at time of d/c in order to maximize pt's functional independence and safety w/ mobility  Pt continues to be functioning below baseline level, and remains limited 2* factors listed above  PT will continue to see pt while here in order to address the deficits listed above and provide interventions consistent w/ POC in effort to achieve STGs  Barriers to Discharge None   Goals   Patient Goals "to return home"   STG Expiration Date 02/20/19   Short Term Goal #1 STGs remain appropriate   Treatment Day 2   Plan   Treatment/Interventions Functional transfer training;LE strengthening/ROM; Elevations; Therapeutic exercise; Endurance training;Patient/family training;Equipment eval/education; Bed mobility;Gait training;Spoke to nursing;Family;Spoke to case management   Progress Slow progress, decreased activity tolerance   PT Frequency 5x/wk   Recommendation   Recommendation Home PT; Home with family support  (With transition to OP PT (with vestibular emphasis) )   Equipment Recommended Walker  (pt reports she is getting one from family member)   PT - OK to Discharge Yes  (when medically cleared)       Rush Martinez, PT    Time of PT treatment session: [id number]

## 2019-02-13 NOTE — SOCIAL WORK
PT rec home PT  Cm met w pt and spouse  Pt has no POA or AD, but denies need for resources  1 story home  1 step to enter  Pt ind w adls uses [facility] for RXs  Denies issues obtaining medications  Pt agreeable to [facility]  [facility]gree[facility] to [facility]  Referral sent and pt accepted  Pt does not need rw, has one at home  Has ride home at dc  Cm to follow    Observation notice provided    CM reviewed discharge planning process including the following: identifying help at home, patient preference for discharge planning needs, pharmacy preference, and availability of treatment team to discuss questions or concerns patient and/or family may have regarding understanding medications and recognizing signs and symptoms once discharged  CM also encouraged patient to follow up with all recommended appointments after discharge  Patient advised of importance for patient and family to participate in managing patients medical well being  CM name and role reviewed  Discharge Checklist reviewed and CM will continue to monitor for progress toward discharge goals in nursing and provider rounds

## 2019-02-13 NOTE — PLAN OF CARE
Problem: PAIN - ADULT  Goal: Verbalizes/displays adequate comfort level or baseline comfort level  Description  Interventions:  - Encourage patient to monitor pain and request assistance  - Assess pain using appropriate pain scale  - Administer analgesics based on type and severity of pain and evaluate response  - Implement non-pharmacological measures as appropriate and evaluate response  - Consider cultural and social influences on pain and pain management  - Notify physician/advanced practitioner if interventions unsuccessful or patient reports new pain  Outcome: Progressing     Problem: INFECTION - ADULT  Goal: Absence or prevention of progression during hospitalization  Description  INTERVENTIONS:  - Assess and monitor for signs and symptoms of infection  - Monitor lab/diagnostic results  - Monitor all insertion sites, i e  indwelling lines, tubes, and drains  - Monitor endotracheal (as able) and nasal secretions for changes in amount and color  - [place] appropriate cooling/warming therapies per order  - Administer medications as ordered  - Instruct and encourage patient and family to use good hand hygiene technique  - Identify and instruct in appropriate isolation precautions for identified infection/condition  Outcome: Progressing  Goal: Absence of fever/infection during neutropenic period  Description  INTERVENTIONS:  - Monitor WBC  - Implement neutropenic guidelines  Outcome: Progressing     Problem: SAFETY ADULT  Goal: Patient will remain free of falls  Description  INTERVENTIONS:  - Assess patient frequently for physical needs  -  Identify cognitive and physical deficits and behaviors that affect risk of falls    -  [place] fall precautions as indicated by assessment   - Educate patient/family on patient safety including physical limitations  - Instruct patient to call for assistance with activity based on assessment  - Modify environment to reduce risk of injury  - Consider OT/PT consult to assist with strengthening/mobility  Outcome: Progressing  Goal: Maintain or return to baseline ADL function  Description  INTERVENTIONS:  -  Assess patient's ability to carry out ADLs; assess patient's baseline for ADL function and identify physical deficits which impact ability to perform ADLs (bathing, care of mouth/teeth, toileting, grooming, dressing, etc )  - Assess/evaluate cause of self-care deficits   - Assess range of motion  - Assess patient's mobility; develop plan if impaired  - Assess patient's need for assistive devices and provide as appropriate  - Encourage maximum independence but intervene and supervise when necessary  ¯ Involve family in performance of ADLs  ¯ Assess for home care needs following discharge   ¯ Request OT consult to assist with ADL evaluation and planning for discharge  ¯ Provide patient education as appropriate  Outcome: Progressing  Goal: Maintain or return mobility status to optimal level  Description  INTERVENTIONS:  - Assess patient's baseline mobility status (ambulation, transfers, stairs, etc )    - Identify cognitive and physical deficits and behaviors that affect mobility  - Identify mobility aids required to assist with transfers and/or ambulation (gait belt, sit-to-stand, lift, walker, cane, etc )  - [place] fall precautions as indicated by assessment  - Record patient progress and toleration of activity level on Mobility SBAR; progress patient to next Phase/Stage  - Instruct patient to call for assistance with activity based on assessment  - Request Rehabilitation consult to assist with strengthening/weightbearing, etc   Outcome: Progressing     Problem: DISCHARGE PLANNING  Goal: Discharge to home or other facility with appropriate resources  Description  INTERVENTIONS:  - Identify barriers to discharge w/patient and caregiver  - Arrange for needed discharge resources and transportation as appropriate  - Identify discharge learning needs (meds, wound care, etc )  - Arrange for interpretive services to assist at discharge as needed  - Refer to Case Management Department for coordinating discharge planning if the patient needs post-hospital services based on physician/advanced practitioner order or complex needs related to functional status, cognitive ability, or social support system  Outcome: Progressing     Problem: Knowledge Deficit  Goal: Patient/family/caregiver demonstrates understanding of disease process, treatment plan, medications, and discharge instructions  Description  Complete learning assessment and assess knowledge base    Interventions:  - Provide teaching at level of understanding  - Provide teaching via preferred learning methods  Outcome: Progressing

## 2019-02-13 NOTE — PHYSICAL THERAPY NOTE
Physical Therapy Evaluation     Patient's Name: Rosemarie Iraheta    Admitting Diagnosis  TIA (transient ischemic attack) [G45 9]  Vertigo [R42]  Vomiting [R11 10]  Hyperglycemia [R73 9]  Recurrent vertigo [R42]  Acute nonintractable headache, unspecified headache type [R51]    Problem List  Patient Active Problem List   Diagnosis    Gastritis    GERD (gastroesophageal reflux disease)    Colon polyp    Ventral hernia with obstruction and without gangrene    Umbilical hernia without obstruction and without gangrene    Uncontrolled type 2 diabetes mellitus, without long-term current use of insulin ([facility] )    Dyslipidemia    Vertigo    Osteoarthritis       Past Medical History  Past Medical History:   Diagnosis Date    Arthritis     Colonic polyp     resolved: 08/25/2017    Diabetes mellitus ([facility] )     Dry eye syndrome of bilateral lacrimal glands     last assessed: 05/15/2015    GERD (gastroesophageal reflux disease)     History of screening mammography     last assessed: 07/25/2016    Hypertension     Lyme disease     Osteoarthritis     last assessed: 07/25/2016    RA (rheumatoid arthritis) ([facility] )     Rheumatoid arteritis     Rheumatoid arthritis (HCC)     osteoarthritis    Stroke ([place] [address] )     Symptomatic hypotension     last assessed: 03/22/2017    Ventral hernia with obstruction and without gangrene     last assessed: 02/16/2017    Vertigo        Past Surgical History  Past Surgical History:   Procedure Laterality Date    BACK SURGERY      CYST REMOVAL      EGD AND COLONOSCOPY N/A 2/3/2017    Procedure: COLONOSCOPY; POSSIBLE POLYPECTOMY; POSSIBLE BIOPSY AND EGD ;  Surgeon: Jak Sheridan MD;  Location: [facility];   Service:     HERNIA REPAIR      LIPOMA RESECTION      OTHER SURGICAL HISTORY      excision of sebaceous cst to back    TUBAL LIGATION      VENTRAL HERNIA REPAIR N/A 3/17/2017    Procedure: LAPAROSCOPIC VENTRAL HERNIA REPAIR WITH MESH ;  Surgeon: Jak Sheridan MD;  Location: [facility];  Service:           02/13/19 0741   Note Type   Note type Eval/Treat   Pain Assessment   Pain Assessment FLACC   Pain Type Chronic pain   Pain Location Leg;Knee   Pain Orientation Bilateral   Pain Descriptors Aching   Pain Frequency Intermittent   Pain Onset Gradual   Clinical Progression Not changed   Hospital Pain Intervention(s) Repositioned; Ambulation/increased activity   Multiple Pain Sites No   Pain Rating: FLACC (Rest) - Face 0   Pain Rating: FLACC (Rest) - Legs 0   Pain Rating: FLACC (Rest) - Activity 0   Pain Rating: FLACC (Rest) - Cry 0   Pain Rating: FLACC (Rest) - Consolability 0   Score: FLACC (Rest) 0   Pain Rating: FLACC (Activity) - Face 1   Pain Rating: FLACC (Activity) - Legs 0   Pain Rating: FLACC (Activity) - Activity 0   Pain Rating: FLACC (Activity) - Cry 1   Pain Rating: FLACC (Activity) - Consolability 1   Score: FLACC (Activity) 3   Home Living   Type of Home House   Home Layout One level;Performs ADLs on one level; Able to live on main level with bedroom/bathroom;Stairs to enter with rails  (4-5 ARLET c bilateral rails)   Bathroom Shower/Tub Tub/shower unit   Bathroom Toilet Standard   Bathroom Equipment Grab bars in shower   [facility]   Prior Function   Level of [place] Independent with ADLs and functional mobility   Lives With Spouse; Son   Lydia 69 Help From Family   ADL Assistance Independent   IADLs Needs assistance   Falls in the last 6 months 1 to 4  (3)   Vocational Unemployed   Comments (-)    Restrictions/Precautions   [place] Precautions Per Order No   Braces or Orthoses Other (Comment)  (none per patient)   Other Precautions Fall Risk;Pain;Multiple lines  (resting vitals: BP: 95/57, HR: 70 bpm, SpO2: 96% on RA)   General   Additional Pertinent History CT head without contrast (2/12/2019): IMPRESSION: No CT evidence of an acute intracranial abnormality     Family/Caregiver Present Yes  (Spouse)   Cognition Overall Cognitive Status WFL   Arousal/Participation Cooperative   Orientation Level Oriented X4   Memory Within functional limits   Following Commands Follows all commands and directions without difficulty   Comments Pt agreeable to participate in skilled PT evaluation   RUE Assessment   RUE Assessment WFL  (based on functional assessment)   LUE Assessment   LUE Assessment WFL  (based on functional assessment)   RLE Assessment   RLE Assessment WFL  (grossly assessed with functional mobility: at least 3+/5)   LLE Assessment   LLE Assessment WFL  (grossly assessed with functional mobility: at least 3+/5)   Coordination   Movements are Fluid and Coordinated 1   Sensation [facility]  (Patient reports intermittent tingling in fingers)   Light Touch   RLE Light Touch Grossly intact   LLE Light Touch Grossly intact   Bed Mobility   Supine to Sit 5  Supervision   Additional items HOB elevated; Increased time required;Verbal cues   Sit to Supine 5  Supervision   Additional items Increased time required;Verbal cues;HOB elevated   Transfers   Sit to Stand 5  Supervision   Additional items Assist x 1; Increased time required;Verbal cues   Stand to Sit 5  Supervision   Additional items Assist x 1; Increased time required;Verbal cues   Ambulation/Elevation   Gait pattern Step to;Short stride; Excessively slow;Decreased foot clearance   Gait Assistance 5  Supervision  (SBA)   Additional items Assist x 1;Verbal cues   Assistive Device None   Distance 15' x 2  (Mild dizziness upon navigating turns)   Stair Management Assistance Not tested   Balance   Static Sitting Good   Dynamic Sitting Good   Static Standing Fair +   Dynamic Standing Fair   Ambulatory Fair -   Endurance Deficit   Endurance Deficit Yes   Endurance Deficit Description Chronic pain secondary to history of osteoarthritis and rheumatoid arthritis; general deconditioning; history of vertigo   Activity Tolerance   Activity Tolerance Patient limited by fatigue;Treatment limited secondary to medical complications (Comment)  (Mild dizziness upon navigating turns)   Nurse Made Mariann Cifuentes, RN verbalized patient appropriate for PT evaluation   Assessment   Prognosis Good   Problem List Decreased endurance; Impaired balance;Decreased mobility; Decreased strength;Pain; Impaired sensation   Assessment Pt is 47 y o  female seen for PT evaluation s/p admit to [facility] on 2019 w/ Vertigo  PT consulted to assess pt's functional mobility and d/c needs  Order placed for PT eval and tx, w/ up w/ A order  Performed at least 2 patient identifiers during session: Name and   Comorbidities affecting pt's physical performance at time of assessment include: DM, GERD, HTN, Lyme disease, osteoarthritis, rheumatoid arthritis, stroke, symptomatic hypotension, vertigo, ventral hernia with obstruction and without gangrene  PTA, pt was independent w/ all functional mobility w/ cane (intermittent use), ambulates household distances, has 4-5 ARLET, lives w/ family in one level house and unemployed  Personal factors affecting pt at time of IE include: inaccessible home environment, stairs to enter home, positive fall history and inability to perform IADLs  Please find objective findings from PT assessment regarding body systems outlined above with impairments and limitations including impaired balance, decreased endurance, gait deviations, pain, decreased activity tolerance, decreased functional mobility tolerance, altered sensation and fall risk  The following objective measures performed on IE also reveal limitations: Barthel Index: 55/100 and Modified Olman: 3 (moderate disability)  Pt's clinical presentation is currently unstable/unpredictable seen in pt's presentation of ongoing medical management/monitoring, pain impacting overall mobility status and need for input for mobility technique/safety   Pt to benefit from continued PT tx to address deficits as defined above and maximize level of functional independent mobility and consistency  From PT/mobility standpoint, recommendation at time of d/c would be Home PT with family support pending progress in order to facilitate return to PLOF  Barriers to Discharge Inaccessible home environment   Barriers to Discharge Comments 4-5 ARLET c bilateral rails   Goals   Patient Goals to return home   STG Expiration Date 02/20/19   Short Term Goal #1 In 7 days: Increase bilateral LE strength 1/2 grade to facilitate independent mobility, Perform all bed mobility tasks modified independent to decrease caregiver burden, Perform all transfers modified independent to improve independence, Ambulate > 50 ft  with least restrictive assistive device modified independent w/o LOB and w/ normalized gait pattern 100% of the time, Navigate 5 stairs modified independent with bilateral handrails to facilitate return to previous living environment, Increase all balance 1 grade to decrease risk for falls, Complete exercise program independently and Complete % of the time   Treatment Day 1   Plan   Treatment/Interventions Functional transfer training;LE strengthening/ROM; Elevations; Therapeutic exercise; Endurance training;Patient/family training;Equipment eval/education; Bed mobility;Gait training;Spoke to nursing;Family   PT Frequency 5x/wk   Recommendation   Recommendation Home PT; Home with family support  (With transition to OP PT (with vestibular emphasis) )   PT - OK to Discharge No   Modified Olman Scale   Modified Olman Scale 3   Barthel Index   Feeding 10   Bathing 0   Grooming Score 5   Dressing Score 5   Bladder Score 10   Bowels Score 10   Toilet Use Score 5   Transfers (Bed/Chair) Score 10   Mobility (Level Surface) Score 0   Stairs Score 0   Barthel Index Score 55         Jameyllene Socks, PT, DPT

## 2019-02-13 NOTE — DISCHARGE SUMMARY
Discharge Summary - [facility] Internal Medicine    Patient Information: Edgard Ellsworth 47 y o  female MRN: [id number]  Unit/Bed#: -01 Encounter: [id number]    Discharging Physician / Practitioner: Gladis Pina MD  PCP: No primary care provider on file  Admission Date: 2/12/2019  Discharge Date: 02/13/19    Disposition:     Home    Reason for Admission: Dizziness    Discharge Diagnoses:     Principal Problem:    Vertigo  Active Problems:    GERD (gastroesophageal reflux disease)    Uncontrolled type 2 diabetes mellitus, without long-term current use of insulin (HCC)    Dyslipidemia    Osteoarthritis  Resolved Problems:    * No resolved hospital problems  *      Consultations During Hospital Stay:  · none    Procedures Performed:     · none    Significant Findings / Test Results:     · CT head  Impression:       No CT evidence of an acute intracranial abnormality  ·     Incidental Findings:   ·      Test Results Pending at Discharge (will require follow up):   ·      Outpatient Tests Requested:  ·     Complications:  None    History of Present Illness:     Edgard Ellsworth is a 47 y o  female with past medical history of vertigo, diabetes, osteoarthritis,? Rheumatoid arthritis, GERD, hernia, ? TIA who presents with complaining of vertigo this morning associated with an episode of nonbloody vomiting  Patient describes vertigo as room spinning around  Reports that changing position appears to be exacerbating her symptoms  Patient reports that she used to to take diphenhydramine for vertigo which she  run out  During initial evaluation in ED patient had complain of associated headache, numbness in both arms and both legs  On my encounter patient reports feeling much better  Reports that vertigo was resolved  Denies chest pain, dyspnea, fever, chills, blurry vision, vertigo, tinnitus, recent illness, abdominal pain, dysuria, diarrhea, focal neurological complaints    Last menstrual cycle was when she was 51years of age  Patient also reports that she has stopped taking metformin since she was getting upset stomach  Also reports that she is in process of getting a new primary care provider  Lives with   Ambulates independently  Former smoker, denies alcohol use  Level 1 full code  No other events reported  Hospital Course:     Yonis Tsang is a 47 y o  female patient who originally presented to the hospital on 2/12/2019 due to complaints of vertigo  Patient has a history of vertigo and ran out of her medications  Patient was placed on meclizine  Her dizziness has improved  Patient will be discharged home with PO meclizine  Condition at Discharge: stable     Discharge Day Visit / Exam:     Subjective:  Patient seen and examined at bedside  Patient has no new complaints  Vitals: Blood Pressure: 95/57 (02/13/19 0748)  Pulse: 68 (02/13/19 0748)  Temperature: 98 3 °F (36 8 °C) (02/13/19 0748)  Temp Source: Oral (02/13/19 0748)  Respirations: 18 (02/13/19 0748)  Height: 5' 3" (160 cm) (02/12/19 1355)  Weight - Scale: 60 kg (132 lb 4 4 oz) (02/12/19 1355)  SpO2: 99 % (02/13/19 0748)  Exam:   Physical Exam   Constitutional: She is oriented to person, place, and time  She appears well-developed and well-nourished  HENT:   Head: Normocephalic and atraumatic  Eyes: Pupils are equal, round, and reactive to light  EOM are normal    Neck: Normal range of motion  Neck supple  No JVD present  No tracheal deviation present  No thyromegaly present  Cardiovascular: Normal rate, regular rhythm and normal heart sounds  Exam reveals no gallop and no friction rub  No murmur heard  Pulmonary/Chest: Effort normal and breath sounds normal  No stridor  No respiratory distress  She has no wheezes  Abdominal: Soft  Bowel sounds are normal  She exhibits no distension  There is no tenderness  There is no guarding  Musculoskeletal: Normal range of motion  She exhibits no edema     Neurological: She is alert and oriented to person, place, and time  Skin: Skin is warm and dry  No rash noted  No erythema  No pallor  Discussion with Family:     Discharge instructions/Information to patient and family:   See after visit summary for information provided to patient and family  Provisions for Follow-Up Care:  See after visit summary for information related to follow-up care and any pertinent home health orders  Planned Readmission: none     Discharge Statement:  I spent 50 minutes discharging the patient  This time was spent on the day of discharge  I had direct contact with the patient on the day of discharge  Greater than 50% of the total time was spent examining patient, answering all patient questions, arranging and discussing plan of care with patient as well as directly providing post-discharge instructions  Additional time then spent on discharge activities  Discharge Medications:  See after visit summary for reconciled discharge medications provided to patient and family        ** Please Note: This note has been constructed using a voice recognition system **

## 2019-02-14 ENCOUNTER — TRANSITIONAL CARE MANAGEMENT (OUTPATIENT)
Dept: FAMILY MEDICINE CLINIC | Facility: CLINIC | Age: 54
End: 2019-02-14

## 2019-02-22 ENCOUNTER — TELEPHONE (OUTPATIENT)
Dept: ENDOCRINOLOGY | Facility: CLINIC | Age: 54
End: 2019-02-22

## 2019-02-22 DIAGNOSIS — E11.65 UNCONTROLLED TYPE 2 DIABETES MELLITUS WITH HYPERGLYCEMIA (HCC): Primary | ICD-10-CM

## 2019-02-26 ENCOUNTER — OFFICE VISIT (OUTPATIENT)
Dept: FAMILY MEDICINE CLINIC | Facility: CLINIC | Age: 54
End: 2019-02-26
Payer: COMMERCIAL

## 2019-02-26 VITALS
WEIGHT: 127 LBS | DIASTOLIC BLOOD PRESSURE: 70 MMHG | RESPIRATION RATE: 16 BRPM | HEIGHT: 63 IN | OXYGEN SATURATION: 97 % | HEART RATE: 73 BPM | SYSTOLIC BLOOD PRESSURE: 122 MMHG | BODY MASS INDEX: 22.5 KG/M2 | TEMPERATURE: 98.3 F

## 2019-02-26 DIAGNOSIS — IMO0002 UNCONTROLLED TYPE 2 DIABETES MELLITUS, WITHOUT LONG-TERM CURRENT USE OF INSULIN: ICD-10-CM

## 2019-02-26 DIAGNOSIS — I10 ESSENTIAL HYPERTENSION: ICD-10-CM

## 2019-02-26 DIAGNOSIS — R11.0 NAUSEA: ICD-10-CM

## 2019-02-26 DIAGNOSIS — R42 VERTIGO: ICD-10-CM

## 2019-02-26 DIAGNOSIS — I63.9 CEREBROVASCULAR ACCIDENT (CVA), UNSPECIFIED MECHANISM (HCC): ICD-10-CM

## 2019-02-26 DIAGNOSIS — IMO0001 TRANSITION OF CARE PERFORMED WITH SHARING OF CLINICAL SUMMARY: Primary | ICD-10-CM

## 2019-02-26 PROCEDURE — 99496 TRANSJ CARE MGMT HIGH F2F 7D: CPT | Performed by: INTERNAL MEDICINE

## 2019-02-26 PROCEDURE — 1111F DSCHRG MED/CURRENT MED MERGE: CPT | Performed by: INTERNAL MEDICINE

## 2019-02-26 RX ORDER — OMEPRAZOLE 20 MG/1
20 CAPSULE, DELAYED RELEASE ORAL DAILY
Qty: 30 CAPSULE | Refills: 5 | Status: SHIPPED | OUTPATIENT
Start: 2019-02-26 | End: 2019-03-08

## 2019-02-26 RX ORDER — ASPIRIN 81 MG/1
81 TABLET ORAL DAILY
COMMUNITY

## 2019-02-26 NOTE — PROGRESS NOTES
TCM Call (since 1/27/2019)     Date and time call was made  2/14/2019  9:23 AM    Patient was hospitialized at  [facility]    Date of Admission  02/12/19    Date of discharge  02/13/19    Diagnosis  vertigo    Disposition  Home      TCM Call (since 1/27/2019)     Comments  left message for patient to call and schedule follow up appointment        Assessment/Plan:         Diagnoses and all orders for this visit:    Transition of care performed with sharing of clinical summary    Vertigo  Comments:  states she developed this after her stroke  uses dramamine in past but ran out  now back on it plus antivert    Uncontrolled type 2 diabetes mellitus, without long-term current use of insulin ([address] )  Comments:  back on her metformen since nauses and vomiting are controlled  her a1c is >11 0   discussed endo  she is changing to a dr closer to her house  Essential hypertension  Comments:  stabled    Cerebrovascular accident (CVA), unspecified mechanism ([address] )  Comments:  stable    Nausea  -     omeprazole (PriLOSEC) 20 mg delayed release capsule; Take 1 capsule (20 mg total) by mouth daily    Other orders  -     aspirin (ECOTRIN LOW STRENGTH) 81 mg EC tablet; Take 81 mg by mouth daily          Subjective:      Patient ID: Nick Ellington is a 47 y o  female  Patient was admitted on the 12th and discharged on the 13th  She was admitted with vertigo  She had 2-3 episodes of nausea and vomiting  While in the hospital she underwent vestibular physical therapy and states she obtained some relief  She states she has a visiting  nurse who comes to her house and the nurse that she was going to send her to a doctor for disability    Since home she feels she still has some vertigo but not nearly as bad as in the hospital       The following portions of the patient's history were reviewed and updated as appropriate: She  has a past medical history of Arthritis, Colonic polyp, Diabetes mellitus ([address] ), Dry eye syndrome of bilateral lacrimal glands, GERD (gastroesophageal reflux disease), History of screening mammography, Hypertension, Lyme disease, Osteoarthritis, RA (rheumatoid arthritis) ([facility] ), Rheumatoid arteritis, Rheumatoid arthritis ([facility] ), Stroke ([facility] ), Symptomatic hypotension, Ventral hernia with obstruction and without gangrene, and Vertigo  She   Patient Active Problem List    Diagnosis Date Noted    Hypertension 02/26/2019    Stroke ([facility] ) 02/26/2019    Vertigo 02/12/2019    Osteoarthritis 02/12/2019    Dyslipidemia 04/24/2017    Ventral hernia with obstruction and without gangrene 75/59/9849    Umbilical hernia without obstruction and without gangrene 03/17/2017    Gastritis 02/03/2017    GERD (gastroesophageal reflux disease) 02/03/2017    Colon polyp 02/03/2017    Uncontrolled type 2 diabetes mellitus, without long-term current use of insulin ([facility] ) 12/21/2016     She  has a past surgical history that includes Back surgery; Other surgical history; Cyst Removal; Ventral hernia repair (N/A, 3/17/2017); EGD AND COLONOSCOPY (N/A, 2/3/2017); Tubal ligation; Lipoma resection; and Hernia repair  Her family history includes Breast cancer in her paternal aunt; COPD in her father and mother; Depression in her daughter; Diabetes in her mother and paternal grandmother; Heart attack in her father; Heart disease in her father; Stroke in her father  She  reports that she quit smoking about 29 years ago  Her smoking use included cigarettes  She has a 0 50 pack-year smoking history  She has never used smokeless tobacco  She reports that she drinks alcohol  She reports that she does not use drugs    Current Outpatient Medications   Medication Sig Dispense Refill    ascorbic acid (VITAMIN C) 500 mg tablet Take 500 mg by mouth daily      aspirin (ECOTRIN LOW STRENGTH) 81 mg EC tablet Take 81 mg by mouth daily      atorvastatin (LIPITOR) 10 mg tablet Take 1 tablet (10 mg total) by mouth daily 30 tablet 5    calcium carbonate (TUMS) 500 mg chewable tablet Chew 1 tablet daily      Cyanocobalamin (B-12 PO) Take 1 tablet by mouth        dimenhyDRINATE (DRAMAMINE) 50 mg tablet Take 50 mg by mouth 2 (two) times a day        docusate sodium (COLACE) 100 mg capsule Take 100 mg by mouth 2 (two) times a day as needed        meclizine (ANTIVERT) 12 5 MG tablet Take 1 tablet (12 5 mg total) by mouth every 8 (eight) hours as needed for dizziness 30 tablet 0    metFORMIN (GLUCOPHAGE) 500 mg tablet Take 1 tablet (500 mg total) by mouth 2 (two) times a day with meals for 30 days 60 tablet 0    lisinopril (ZESTRIL) 5 mg tablet Take 1 tablet (5 mg total) by mouth daily (Patient not taking: Reported on 2/26/2019) 30 tablet 5    omeprazole (PriLOSEC) 20 mg delayed release capsule Take 1 capsule (20 mg total) by mouth daily 30 capsule 5     No current facility-administered medications for this visit  Current Outpatient Medications on File Prior to Visit   Medication Sig    ascorbic acid (VITAMIN C) 500 mg tablet Take 500 mg by mouth daily    aspirin (ECOTRIN LOW STRENGTH) 81 mg EC tablet Take 81 mg by mouth daily    atorvastatin (LIPITOR) 10 mg tablet Take 1 tablet (10 mg total) by mouth daily    calcium carbonate (TUMS) 500 mg chewable tablet Chew 1 tablet daily    Cyanocobalamin (B-12 PO) Take 1 tablet by mouth      dimenhyDRINATE (DRAMAMINE) 50 mg tablet Take 50 mg by mouth 2 (two) times a day      docusate sodium (COLACE) 100 mg capsule Take 100 mg by mouth 2 (two) times a day as needed      meclizine (ANTIVERT) 12 5 MG tablet Take 1 tablet (12 5 mg total) by mouth every 8 (eight) hours as needed for dizziness    metFORMIN (GLUCOPHAGE) 500 mg tablet Take 1 tablet (500 mg total) by mouth 2 (two) times a day with meals for 30 days    lisinopril (ZESTRIL) 5 mg tablet Take 1 tablet (5 mg total) by mouth daily (Patient not taking: Reported on 2/26/2019)     No current facility-administered medications on file prior to visit  She is allergic to penicillins       Review of Systems   Constitutional: Negative  HENT: Negative  Respiratory: Negative  Cardiovascular: Negative  Objective:      /70 (BP Location: Left arm, Patient Position: Sitting, Cuff Size: Standard)   Pulse 73   Temp 98 3 °F (36 8 °C) (Tympanic)   Resp 16   Ht 5' 3" (1 6 m)   Wt 57 6 kg (127 lb)   SpO2 97%   BMI 22 50 kg/m²          Physical Exam   Constitutional: She appears well-developed and well-nourished  No distress  HENT:   Head: Normocephalic and atraumatic  Right Ear: External ear normal    Left Ear: External ear normal    Nose: Nose normal    Mouth/Throat: Oropharynx is clear and moist    Neurological: She is alert  She displays normal reflexes  No cranial nerve deficit  Coordination abnormal    str = b/l   Skin: She is not diaphoretic

## 2019-03-08 ENCOUNTER — APPOINTMENT (EMERGENCY)
Dept: RADIOLOGY | Facility: HOSPITAL | Age: 54
End: 2019-03-08
Payer: COMMERCIAL

## 2019-03-08 ENCOUNTER — APPOINTMENT (EMERGENCY)
Dept: CT IMAGING | Facility: HOSPITAL | Age: 54
End: 2019-03-08
Payer: COMMERCIAL

## 2019-03-08 ENCOUNTER — HOSPITAL ENCOUNTER (EMERGENCY)
Facility: HOSPITAL | Age: 54
Discharge: HOME | End: 2019-03-08
Attending: EMERGENCY MEDICINE
Payer: COMMERCIAL

## 2019-03-08 VITALS
BODY MASS INDEX: 22.5 KG/M2 | WEIGHT: 127 LBS | DIASTOLIC BLOOD PRESSURE: 68 MMHG | HEART RATE: 70 BPM | SYSTOLIC BLOOD PRESSURE: 121 MMHG | RESPIRATION RATE: 16 BRPM | OXYGEN SATURATION: 96 % | TEMPERATURE: 97.9 F | HEIGHT: 63 IN

## 2019-03-08 DIAGNOSIS — J06.9 VIRAL URI WITH COUGH: ICD-10-CM

## 2019-03-08 DIAGNOSIS — K62.5 RECTAL BLEEDING: Primary | ICD-10-CM

## 2019-03-08 DIAGNOSIS — K59.00 CONSTIPATION: ICD-10-CM

## 2019-03-08 DIAGNOSIS — K64.9 HEMORRHOIDS: ICD-10-CM

## 2019-03-08 LAB
ALBUMIN SERPL BCP-MCNC: 3.7 G/DL (ref 3.5–5)
ALP SERPL-CCNC: 119 U/L (ref 46–116)
ALT SERPL W P-5'-P-CCNC: 26 U/L (ref 12–78)
ANION GAP SERPL CALCULATED.3IONS-SCNC: 10 MMOL/L (ref 4–13)
APTT PPP: 30 SECONDS (ref 26–38)
AST SERPL W P-5'-P-CCNC: 11 U/L (ref 5–45)
BASOPHILS # BLD AUTO: 0.02 THOUSANDS/ΜL (ref 0–0.1)
BASOPHILS NFR BLD AUTO: 0 % (ref 0–1)
BILIRUB DIRECT SERPL-MCNC: 0.13 MG/DL (ref 0–0.2)
BILIRUB SERPL-MCNC: 0.4 MG/DL (ref 0.2–1)
BILIRUB UR QL STRIP: NEGATIVE
BUN SERPL-MCNC: 11 MG/DL (ref 5–25)
CALCIUM SERPL-MCNC: 9.2 MG/DL (ref 8.3–10.1)
CHLORIDE SERPL-SCNC: 103 MMOL/L (ref 100–108)
CLARITY UR: CLEAR
CO2 SERPL-SCNC: 29 MMOL/L (ref 21–32)
COLOR UR: NORMAL
CREAT SERPL-MCNC: 0.68 MG/DL (ref 0.6–1.3)
EOSINOPHIL # BLD AUTO: 0.11 THOUSAND/ΜL (ref 0–0.61)
EOSINOPHIL NFR BLD AUTO: 2 % (ref 0–6)
ERYTHROCYTE [DISTWIDTH] IN BLOOD BY AUTOMATED COUNT: 11.4 % (ref 11.6–15.1)
GFR SERPL CREATININE-BSD FRML MDRD: 101 ML/MIN/1.73SQ M
GLUCOSE SERPL-MCNC: 159 MG/DL (ref 65–140)
GLUCOSE UR STRIP-MCNC: NEGATIVE MG/DL
HCT VFR BLD AUTO: 41.8 % (ref 34.8–46.1)
HGB BLD-MCNC: 13.9 G/DL (ref 11.5–15.4)
HGB UR QL STRIP.AUTO: NEGATIVE
IMM GRANULOCYTES # BLD AUTO: 0.02 THOUSAND/UL (ref 0–0.2)
IMM GRANULOCYTES NFR BLD AUTO: 0 % (ref 0–2)
INR PPP: 0.98 (ref 0.86–1.17)
KETONES UR STRIP-MCNC: NEGATIVE MG/DL
LEUKOCYTE ESTERASE UR QL STRIP: NEGATIVE
LIPASE SERPL-CCNC: 159 U/L (ref 73–393)
LYMPHOCYTES # BLD AUTO: 2.08 THOUSANDS/ΜL (ref 0.6–4.47)
LYMPHOCYTES NFR BLD AUTO: 31 % (ref 14–44)
MCH RBC QN AUTO: 28.5 PG (ref 26.8–34.3)
MCHC RBC AUTO-ENTMCNC: 33.3 G/DL (ref 31.4–37.4)
MCV RBC AUTO: 86 FL (ref 82–98)
MONOCYTES # BLD AUTO: 0.61 THOUSAND/ΜL (ref 0.17–1.22)
MONOCYTES NFR BLD AUTO: 9 % (ref 4–12)
NEUTROPHILS # BLD AUTO: 3.95 THOUSANDS/ΜL (ref 1.85–7.62)
NEUTS SEG NFR BLD AUTO: 58 % (ref 43–75)
NITRITE UR QL STRIP: NEGATIVE
NRBC BLD AUTO-RTO: 0 /100 WBCS
PH UR STRIP.AUTO: 6 [PH]
PLATELET # BLD AUTO: 180 THOUSANDS/UL (ref 149–390)
PMV BLD AUTO: 9.9 FL (ref 8.9–12.7)
POTASSIUM SERPL-SCNC: 4.5 MMOL/L (ref 3.5–5.3)
PROT SERPL-MCNC: 7.9 G/DL (ref 6.4–8.2)
PROT UR STRIP-MCNC: NEGATIVE MG/DL
PROTHROMBIN TIME: 12.9 SECONDS (ref 11.8–14.2)
RBC # BLD AUTO: 4.87 MILLION/UL (ref 3.81–5.12)
SODIUM SERPL-SCNC: 142 MMOL/L (ref 136–145)
SP GR UR STRIP.AUTO: <=1.005 (ref 1–1.03)
UROBILINOGEN UR QL STRIP.AUTO: 0.2 E.U./DL
WBC # BLD AUTO: 6.79 THOUSAND/UL (ref 4.31–10.16)

## 2019-03-08 PROCEDURE — 85610 PROTHROMBIN TIME: CPT | Performed by: EMERGENCY MEDICINE

## 2019-03-08 PROCEDURE — 85025 COMPLETE CBC W/AUTO DIFF WBC: CPT | Performed by: EMERGENCY MEDICINE

## 2019-03-08 PROCEDURE — 71046 X-RAY EXAM CHEST 2 VIEWS: CPT

## 2019-03-08 PROCEDURE — 36415 COLL VENOUS BLD VENIPUNCTURE: CPT | Performed by: EMERGENCY MEDICINE

## 2019-03-08 PROCEDURE — 96360 HYDRATION IV INFUSION INIT: CPT

## 2019-03-08 PROCEDURE — 81003 URINALYSIS AUTO W/O SCOPE: CPT | Performed by: EMERGENCY MEDICINE

## 2019-03-08 PROCEDURE — 83690 ASSAY OF LIPASE: CPT | Performed by: EMERGENCY MEDICINE

## 2019-03-08 PROCEDURE — 74177 CT ABD & PELVIS W/CONTRAST: CPT

## 2019-03-08 PROCEDURE — 85730 THROMBOPLASTIN TIME PARTIAL: CPT | Performed by: EMERGENCY MEDICINE

## 2019-03-08 PROCEDURE — 80048 BASIC METABOLIC PNL TOTAL CA: CPT | Performed by: EMERGENCY MEDICINE

## 2019-03-08 PROCEDURE — 99285 EMERGENCY DEPT VISIT HI MDM: CPT

## 2019-03-08 PROCEDURE — 80076 HEPATIC FUNCTION PANEL: CPT | Performed by: EMERGENCY MEDICINE

## 2019-03-08 RX ORDER — ACETAMINOPHEN 325 MG/1
650 TABLET ORAL ONCE
Status: COMPLETED | OUTPATIENT
Start: 2019-03-08 | End: 2019-03-08

## 2019-03-08 RX ORDER — POLYETHYLENE GLYCOL 3350 17 G/17G
17 POWDER, FOR SOLUTION ORAL DAILY PRN
Qty: 14 EACH | Refills: 0 | Status: SHIPPED | OUTPATIENT
Start: 2019-03-08

## 2019-03-08 RX ORDER — GUAIFENESIN 600 MG
600 TABLET, EXTENDED RELEASE 12 HR ORAL ONCE
Status: COMPLETED | OUTPATIENT
Start: 2019-03-08 | End: 2019-03-08

## 2019-03-08 RX ORDER — RANITIDINE HCL 75 MG
75 TABLET ORAL 2 TIMES DAILY
COMMUNITY
End: 2019-09-30 | Stop reason: SDUPTHER

## 2019-03-08 RX ORDER — GUAIFENESIN 600 MG
1200 TABLET, EXTENDED RELEASE 12 HR ORAL EVERY 12 HOURS PRN
Qty: 12 TABLET | Refills: 0 | Status: SHIPPED | OUTPATIENT
Start: 2019-03-08 | End: 2020-12-01

## 2019-03-08 RX ADMIN — SODIUM CHLORIDE 1000 ML: 0.9 INJECTION, SOLUTION INTRAVENOUS at 17:32

## 2019-03-08 RX ADMIN — ACETAMINOPHEN 650 MG: 325 TABLET, FILM COATED ORAL at 17:45

## 2019-03-08 RX ADMIN — IOHEXOL 100 ML: 350 INJECTION, SOLUTION INTRAVENOUS at 18:13

## 2019-03-08 RX ADMIN — GUAIFENESIN 600 MG: 600 TABLET, EXTENDED RELEASE ORAL at 17:45

## 2019-03-08 NOTE — ED PROVIDER NOTES
History  Chief Complaint   Patient presents with    Rectal Bleeding     patient is c/o 1 episode of blood in her stool 1 hour ago  notes chronic abd pain  Patient is a 64-year-old female with past medical history of hypertension, hyperlipidemia, non-insulin-dependent diabetes, rheumatoid arthritis, GERD, ventral hernia repair, tubal ligation, presents to the emergency department complaining of 1 episode of rectal bleeding today  Patient reports that after having a bowel movement when she wiped she noted bright red blood on the toilet paper  She did not look into the toilet so could not comment on if there was blood in the toilet bowl or mixed in with her stool  She states this occurred 1 time today  She reports she does have a history of hemorrhoids  She also is chronically constipated and strains to have bowel movements  She states she does take over-the-counter stool softener but does not know what medication she takes  According to medication list, Colace is listed  She also states for the past several days she has been having cold-like symptoms with runny nose, congestion, sore throat and coughing  She also reports having chronic lower abdominal pain that she denies any worse today but has not had any recent evaluation for this pain  She states she was recently here and admitted to the hospital for dizziness  She denies any fever, shaking chills, headache, dizziness or near syncope currently, visual disturbance or eye pain, neck pain or stiffness, chest pain, palpitations, dyspnea or wheezing, nausea, vomiting, diarrhea, melena, dysuria, change in urinary frequency, hematuria, vaginal bleeding, skin rash or color change, extremity weakness or paresthesia or other focal neurologic deficits  Last colonoscopy was in 2017 which showed a benign colonic polyp and internal hemorrhoids  She takes 81 mg of aspirin daily but denies being on any other blood thinners        History provided by: Patient   used: No        Prior to Admission Medications   Prescriptions Last Dose Informant Patient Reported? Taking?    Cyanocobalamin (B-12 PO)  Self Yes Yes   Sig: Take 1 tablet by mouth     ascorbic acid (VITAMIN C) 500 mg tablet  Self Yes Yes   Sig: Take 500 mg by mouth daily   aspirin (ECOTRIN LOW STRENGTH) 81 mg EC tablet  Self Yes Yes   Sig: Take 81 mg by mouth daily   atorvastatin (LIPITOR) 10 mg tablet  Self No Yes   Sig: Take 1 tablet (10 mg total) by mouth daily   calcium carbonate (TUMS) 500 mg chewable tablet  Self Yes Yes   Sig: Chew 1 tablet daily   dimenhyDRINATE (DRAMAMINE) 50 mg tablet  Self Yes Yes   Sig: Take 50 mg by mouth 2 (two) times a day     docusate sodium (COLACE) 100 mg capsule  Self Yes Yes   Sig: Take 100 mg by mouth 2 (two) times a day as needed     lisinopril (ZESTRIL) 5 mg tablet Not Taking at Unknown time Self No No   Sig: Take 1 tablet (5 mg total) by mouth daily   Patient not taking: Reported on 2/26/2019   metFORMIN (GLUCOPHAGE) 500 mg tablet  Self No Yes   Sig: Take 1 tablet (500 mg total) by mouth 2 (two) times a day with meals for 30 days   ranitidine (ZANTAC) 75 MG tablet   Yes Yes   Sig: Take 75 mg by mouth 2 (two) times a day      Facility-Administered Medications: None       Past Medical History:   Diagnosis Date    Arthritis     Colonic polyp     resolved: 08/25/2017    Diabetes mellitus ([facility] )     Dry eye syndrome of bilateral lacrimal glands     last assessed: 05/15/2015    GERD (gastroesophageal reflux disease)     History of screening mammography     last assessed: 07/25/2016    Hypertension     Lyme disease     Osteoarthritis     last assessed: 07/25/2016    [facility]     Rheumatoid arteritis     [facility]oid arthritis ([facility])     osteoarthritis    Stroke ([facility] )     Symptomatic hypotension     last assessed: 03/22/2017    Ventral hernia with obstruction and without gangrene     last assessed: 02/16/2017    Vertigo Past Surgical History:   Procedure Laterality Date    BACK SURGERY      CYST REMOVAL      EGD AND COLONOSCOPY N/A 2/3/2017    Procedure: COLONOSCOPY; POSSIBLE POLYPECTOMY; POSSIBLE BIOPSY AND EGD ;  Surgeon: Jak Sheridan MD;  Location: [facility]; Service:     HERNIA REPAIR      LIPOMA RESECTION      OTHER SURGICAL HISTORY      excision of sebaceous cst to back    TUBAL LIGATION      VENTRAL HERNIA REPAIR N/A 3/17/2017    Procedure: LAPAROSCOPIC VENTRAL HERNIA REPAIR WITH MESH ;  Surgeon: Jak Sheridan MD;  Location: [facility];  Service:        Family History   Problem Relation Age of Onset    COPD Mother         COPD/Emphysema    Diabetes Mother     Heart disease Father     Stroke Father     COPD Father         COPD/Emphysema    Heart attack Father     Diabetes Paternal Grandmother     Breast cancer Paternal Aunt     Depression Daughter      I have reviewed and agree with the history as documented  Social History     Tobacco Use    Smoking status: Former Smoker     Packs/day: 0 25     Years: 2 00     Pack years: 0 50     Types: Cigarettes     Last attempt to quit:      Years since quittin 2    Smokeless tobacco: Never Used   Substance Use Topics    Alcohol use: Yes     Frequency: Monthly or less     Drinks per session: Patient refused     Binge frequency: Never     Comment: Stopped drinking alcohol     Drug use: No        Review of Systems   Constitutional: Negative for chills and fever  HENT: Positive for congestion, rhinorrhea and sore throat  Negative for ear pain  Eyes: Negative for pain and visual disturbance  Respiratory: Positive for cough  Negative for chest tightness, shortness of breath and wheezing  Cardiovascular: Negative for chest pain and palpitations  Gastrointestinal: Positive for abdominal pain, blood in stool and constipation  Negative for abdominal distention, diarrhea, nausea, rectal pain and vomiting     Genitourinary: Negative for dysuria, flank pain, frequency, hematuria, vaginal bleeding and vaginal discharge  Musculoskeletal: Negative for back pain, neck pain and neck stiffness  Skin: Negative for color change, pallor, rash and wound  Allergic/Immunologic: Negative for immunocompromised state  Neurological: Negative for dizziness, syncope, weakness, light-headedness, numbness and headaches  Hematological: Negative for adenopathy  Does not bruise/bleed easily  Psychiatric/Behavioral: Negative for confusion and decreased concentration  All other systems reviewed and are negative  Physical Exam  Physical Exam   Constitutional: She is oriented to person, place, and time  She appears well-developed and well-nourished  No distress  HENT:   Head: Normocephalic and atraumatic  Right Ear: External ear normal    Left Ear: External ear normal    Mouth/Throat: Oropharynx is clear and moist  No oropharyngeal exudate  +Nasal congestion   Eyes: Pupils are equal, round, and reactive to light  Conjunctivae and EOM are normal    Neck: Normal range of motion  Neck supple  No JVD present  Cardiovascular: Normal rate, regular rhythm, normal heart sounds and intact distal pulses  Exam reveals no gallop and no friction rub  No murmur heard  Pulmonary/Chest: Effort normal and breath sounds normal  No respiratory distress  She has no wheezes  She has no rales  Abdominal: Soft  Bowel sounds are normal  She exhibits no distension  There is tenderness  There is no rebound and no guarding  Bilateral lower quadrant abdominal tenderness  Genitourinary: Rectal exam shows guaiac positive stool  Genitourinary Comments: Stool is grossly negative for blood but trace guaiac positive  Musculoskeletal: Normal range of motion  She exhibits no edema or tenderness  Lymphadenopathy:     She has no cervical adenopathy  Neurological: She is alert and oriented to person, place, and time  No gross motor or sensory deficits     Skin: Skin is warm and dry  No rash noted  She is not diaphoretic  No erythema  No pallor  Psychiatric: She has a normal mood and affect  Her behavior is normal    Nursing note and vitals reviewed        Vital Signs  ED Triage Vitals [03/08/19 1546]   Temperature Pulse Respirations Blood Pressure SpO2   97 9 °F (36 6 °C) 72 17 128/86 99 %      Temp Source Heart Rate Source Patient Position - Orthostatic VS BP Location FiO2 (%)   Oral Monitor Sitting Left arm --      Pain Score       5         Vitals:    03/08/19 1546 03/08/19 1914   BP: 128/86 121/68   BP Location: Left arm Right arm   Pulse: 72 70   Resp: 17 16   Temp: 97 9 °F (36 6 °C)    TempSrc: Oral    SpO2: 99% 96%   Weight: 57 6 kg (127 lb)    Height: 5' 3" (1 6 m)        Visual Acuity      ED Medications  Medications   sodium chloride 0 9 % bolus 1,000 mL (0 mL Intravenous Stopped 3/8/19 1832)   guaiFENesin (MUCINEX) 12 hr tablet 600 mg (600 mg Oral Given 3/8/19 1745)   acetaminophen (TYLENOL) tablet 650 mg (650 mg Oral Given 3/8/19 1745)   iohexol (OMNIPAQUE) 350 MG/ML injection (MULTI-DOSE) 100 mL (100 mL Intravenous Given 3/8/19 1813)       Diagnostic Studies  Results Reviewed     Procedure Component Value Units Date/Time    UA w Reflex to Microscopic [[id number]] Collected:  03/08/19 1913    Lab Status:  Final result Specimen:  Urine, Clean Catch Updated:  03/08/19 1924     Color, UA Light Yellow     Clarity, UA Clear     Specific Gravity, UA <=1 005     pH, UA 6 0     Leukocytes, UA Negative     Nitrite, UA Negative     Protein, UA Negative mg/dl      Glucose, UA Negative mg/dl      Ketones, UA Negative mg/dl      Urobilinogen, UA 0 2 E U /dl      Bilirubin, UA Negative     Blood, UA Negative    Lipase [[id number]]  (Normal) Collected:  03/08/19 1731    Lab Status:  Final result Specimen:  Blood from Arm, Right Updated:  03/08/19 1753     Lipase 159 u/L     Basic metabolic panel [[id number]]  (Abnormal) Collected:  03/08/19 1731    Lab Status:  Final result Specimen: Blood from Arm, Right Updated:  03/08/19 1753     Sodium 142 mmol/L      Potassium 4 5 mmol/L      Chloride 103 mmol/L      CO2 29 mmol/L      ANION GAP 10 mmol/L      BUN 11 mg/dL      Creatinine 0 68 mg/dL      Glucose 159 mg/dL      Calcium 9 2 mg/dL      eGFR 101 ml/min/1 73sq m     Narrative:       National Kidney Disease Education Program recommendations are as follows:  GFR calculation is accurate only with a steady state creatinine  Chronic Kidney disease less than 60 ml/min/1 73 sq  meters  Kidney failure less than 15 ml/min/1 73 sq  meters      Hepatic function panel [[id number]]  (Abnormal) Collected:  03/08/19 1731    Lab Status:  Final result Specimen:  Blood from Arm, Right Updated:  03/08/19 1753     Total Bilirubin 0 40 mg/dL      Bilirubin, Direct 0 13 mg/dL      Alkaline Phosphatase 119 U/L      AST 11 U/L      ALT 26 U/L      Total Protein 7 9 g/dL      Albumin 3 7 g/dL     Protime-INR [[id number]]  (Normal) Collected:  03/08/19 1731    Lab Status:  Final result Specimen:  Blood from Arm, Right Updated:  03/08/19 1748     Protime 12 9 seconds      INR 0 98    APTT [[id number]]  (Normal) Collected:  03/08/19 1731    Lab Status:  Final result Specimen:  Blood from Arm, Right Updated:  03/08/19 1748     PTT 30 seconds     CBC and differential [[id number]]  (Abnormal) Collected:  03/08/19 1731    Lab Status:  Final result Specimen:  Blood from Arm, Right Updated:  03/08/19 1739     WBC 6 79 Thousand/uL      RBC 4 87 Million/uL      Hemoglobin 13 9 g/dL      Hematocrit 41 8 %      MCV 86 fL      MCH 28 5 pg      MCHC 33 3 g/dL      RDW 11 4 %      MPV 9 9 fL      Platelets 806 Thousands/uL      nRBC 0 /100 WBCs      Neutrophils Relative 58 %      Immat GRANS % 0 %      Lymphocytes Relative 31 %      Monocytes Relative 9 %      Eosinophils Relative 2 %      Basophils Relative 0 %      Neutrophils Absolute 3 95 Thousands/µL      Immature Grans Absolute 0 02 Thousand/uL      Lymphocytes Absolute 2 08 Thousands/µL      Monocytes Absolute 0 61 Thousand/µL      Eosinophils Absolute 0 11 Thousand/µL      Basophils Absolute 0 02 Thousands/µL                  CT abdomen pelvis with contrast   Final Result by Fatou Tompkins MD (03/08 1831)      Extensive fecalization of the small bowel without prominent bowel dilation is a nonspecific finding  These findings could indicate underlying motility disorder or chronic low-grade partial obstruction  There are some loops of small bowel anterior to the    transverse colon which may indicate an internal hernia as a cause of a chronic low-grade obstruction  Prominent stool throughout the colon without colonic obstruction  The study was marked in [facility] for immediate notification  Workstation performed: [id number]         XR chest 2 views   ED Interpretation by Kyle Mcneil DO (03/08 1840)   No acute abnormality in the chest       Final Result by Reggie Lockhart DO (03/08 1928)      No acute cardiopulmonary disease  Workstation performed: [id number]                    Procedures  Procedures       Phone Contacts  ED Phone Contact    ED Course  ED Course as of Mar 08 1937   [facility] Mar 08, 2019   1759 Hemoglobin: 13 9   1838 Reviewed CT findings and patient's pain is chronic  She is still having bowel movements despite the feeling of constipation  She is not having any nausea or vomiting  No prior bypass surgery so low suspicion for internal hernia  Will most likely give referral for both GI and General surgery  46 Updated patient about essentially normal workup and CT findings likely consistent with a motility disorder as opposed to an actual obstruction or hernia  Advised her to follow up with Dr Jessy Dorsey who was the surgeon who performed her ventral hernia repair  Also will give referral for GI  Discussed ED return parameters  Recommended increasing her fiber intake and will also give script for MiraLax  MDM  Number of Diagnoses or Management Options  Diagnosis management comments: 52-year-old female presents for evaluation of 1 episode of rectal bleeding today  Most likely this is secondary to internal hemorrhoids which she has had in the past   Patient does have external hemorrhoids on exam   She also has chronic abdominal pain and has not had any recent CT imaging  Will check abdominal labs, CT abdomen and pelvis  Will also obtain x-ray to rule out pneumonia given recent URI and cough symptoms  Will give IV fluids, Mucinex and Tylenol for her cold symptoms  If hemoglobin stable and CT unremarkable, will discharge home with follow-up referral for GI  Recommended continued stool softeners and will likely prescribed MiraLax  Amount and/or Complexity of Data Reviewed  Clinical lab tests: ordered and reviewed  Tests in the radiology section of CPT®: ordered and reviewed  Review and summarize past medical records: yes  Independent visualization of images, tracings, or specimens: yes        Disposition  Final diagnoses:   Rectal bleeding   Hemorrhoids   Constipation   Viral URI with cough     Time reflects when diagnosis was documented in both MDM as applicable and the Disposition within this note     Time User Action Codes Description Comment    3/8/2019  7:34 PM Othel Butter E Add [K62 5] Rectal bleeding     3/8/2019  7:34 PM Othel Butter E Add [K64 9] Hemorrhoids     3/8/2019  7:34 PM Othel Butter E Add [K59 00] Constipation     3/8/2019  7:35 PM Luis Officer Add [J06 9,  B97 89] Viral URI with cough       ED Disposition     ED Disposition Condition Date/Time Comment    Discharge Stable Fri Mar 8, 2019  7:34 PM Rosemarie rIaheta discharge to home/self care              Follow-up Information     Follow up With Specialties Details Why Contact Info Additional Information    Dilcia Khoury DO Internal Medicine, Family Medicine Schedule an appointment as soon as possible for a visit   [address] [facility] Zander Cobian MD General Surgery Schedule an appointment as soon as possible for a visit   [address]  [facility] Johana Felix III, MD Gastroenterology Schedule an appointment as soon as possible for a visit   [address]  [address] [facility] [phone]       [facility] Emergency Department Emergency Medicine Go to  If symptoms worsen [address] [facility] [id number] [phone] [place] [facility], [address] [place] [address], [id number]          Patient's Medications   Discharge Prescriptions    GUAIFENESIN (MUCINEX) 600 MG 12 HR TABLET    Take 2 tablets (1,200 mg total) by mouth every 12 (twelve) hours as needed for cough or congestion       Start Date: 3/8/2019  End Date: --       Order Dose: 1,200 mg       Quantity: 12 tablet    Refills: 0    POLYETHYLENE GLYCOL (MIRALAX) 17 G PACKET    Take 17 g by mouth daily as needed (Constipation)       Start Date: 3/8/2019  End Date: --       Order Dose: 17 g       Quantity: 14 each    Refills: 0     No discharge procedures on file      ED Provider  Electronically Signed by           Nahun Schmitz DO  03/08/19 4046

## 2019-03-09 NOTE — DISCHARGE INSTRUCTIONS
Rectal Bleeding   WHAT YOU NEED TO KNOW:   Rectal bleeding can be caused by constipation, hemorrhoids, or anal fissures  It may also be caused by polyps, tumors, or medical conditions, such as colitis or diverticulitis  DISCHARGE INSTRUCTIONS:   Medicines:   · Pain medicine: You may be given medicine to take away or decrease pain  Do not wait until the pain is severe before you take your medicine  · Iron supplement:  Iron helps your body make more red blood cells  · Steroids: This medicine decreases inflammation in your rectum  It may be applied as a cream, ointment, or lotion  · Take your medicine as directed  Contact your healthcare provider if you think your medicine is not helping or if you have side effects  Tell him or her if you are allergic to any medicine  Keep a list of the medicines, vitamins, and herbs you take  Include the amounts, and when and why you take them  Bring the list or the pill bottles to follow-up visits  Carry your medicine list with you in case of an emergency  Follow up with your healthcare provider as directed:  Write down your questions so you remember to ask them during your visits  Drink liquids as directed:  Ask your healthcare provider how much liquid to drink each day and which liquids are best for you  This will help prevent dehydration and constipation  Contact your healthcare provider if:   · You have a fever  · Your rectal bleeding stopped for a time, but has started again  · You have nausea  · You have cold, sweaty, pale skin  · You have changes in your bowel movements, such as diarrhea  · You have questions or concerns about your condition or care  Seek care immediately or call 911 if:   · You are breathing faster than usual     · You are dizzy, lightheaded, or feel faint  · You are confused or cannot think clearly  · You urinate less than usual or not at all  · Your rectal bleeding is constant or heavy      · You have severe abdominal pain or cramping  © 2017 [address] [employer] Information is for End User's use only and may not be sold, redistributed or otherwise used for commercial purposes  All illustrations and images included in CareNotes® are the copyrighted property of A D [employer] M [employer] Inc  or Chadnana Lee  The above information is an  only  It is not intended as medical advice for individual conditions or treatments  Talk to your doctor, nurse or pharmacist before following any medical regimen to see if it is safe and effective for you  Hemorrhoids   WHAT YOU NEED TO KNOW:   Hemorrhoids are swollen blood vessels inside your rectum (internal hemorrhoids) or on your anus (external hemorrhoids)  Sometimes a hemorrhoid may prolapse  This means it extends out of your anus  DISCHARGE INSTRUCTIONS:   Seek care immediately if:   · You have severe pain in your rectum or around your anus  · You have severe pain in your abdomen and you are vomiting  · You have bleeding from your anus that soaks through your underwear  Contact your healthcare provider if:   · You have frequent and painful bowel movements  · Your hemorrhoid looks or feels more swollen than usual      · You do not have a bowel movement for 2 days or more  · You see or feel tissue coming through your anus  · You have questions or concerns about your condition or care  Medicines: You may  need any of the following:  · Medicine  may be given to decrease pain, swelling, and itching  The medicine may come as a pad, cream, or ointment  · Stool softeners  help treat or prevent constipation  · NSAIDs , such as ibuprofen, help decrease swelling, pain, and fever  NSAIDs can cause stomach bleeding or kidney problems in certain people  If you take blood thinner medicine, always ask your healthcare provider if NSAIDs are safe for you  Always read the medicine label and follow directions      · Take your medicine as directed  Contact your healthcare provider if you think your medicine is not helping or if you have side effects  Tell him or her if you are allergic to any medicine  Keep a list of the medicines, vitamins, and herbs you take  Include the amounts, and when and why you take them  Bring the list or the pill bottles to follow-up visits  Carry your medicine list with you in case of an emergency  Manage your symptoms:   · Apply ice on your anus for 15 to 20 minutes every hour or as directed  Use an ice pack, or put crushed ice in a plastic bag  Cover it with a towel before you apply it to your anus  Ice helps prevent tissue damage and decreases swelling and pain  · Take a sitz bath  Fill a bathtub with 4 to 6 inches of warm water  You may also use a sitz bath pan that fits inside a toilet bowl  Sit in the sitz bath for 15 minutes  Do this 3 times a day, and after each bowel movement  The warm water can help decrease pain and swelling  · Keep your anal area clean  Gently wash the area with warm water daily  Soap may irritate the area  After a bowel movement, wipe with moist towelettes or wet toilet paper  Dry toilet paper can irritate the area  Prevent hemorrhoids:   · Do not strain to have a bowel movement  Do not sit on the toilet too long  These actions can increase pressure on the tissues in your rectum and anus  · Drink plenty of liquids  Liquids can help prevent constipation  Ask how much liquid to drink each day and which liquids are best for you  · Eat a variety of high-fiber foods  Examples include fruits, vegetables, and whole grains  Ask your healthcare provider how much fiber you need each day  You may need to take a fiber supplement  · Exercise as directed  Exercise, such as walking, may make it easier to have a bowel movement  Ask your healthcare provider to help you create an exercise plan  · Do not have anal sex    Anal sex can weaken the skin around your rectum and anus  · Avoid heavy lifting  This can cause straining and increase your risk for another hemorrhoid  Follow up with your healthcare provider as directed:  Write down your questions so you remember to ask them during your visits  © 2017 [address] Information is for End User's use only and may not be sold, redistributed or otherwise used for commercial purposes  All illustrations and images included in CareNotes® are the copyrighted property of A D A M , Inc  or Chandana Lee  The above information is an  only  It is not intended as medical advice for individual conditions or treatments  Talk to your doctor, nurse or pharmacist before following any medical regimen to see if it is safe and effective for you  Constipation   WHAT YOU NEED TO KNOW:   Constipation is when you have hard, dry bowel movements, or you go longer than usual between bowel movements  DISCHARGE INSTRUCTIONS:   Seek care immediately if:   · You have blood in your bowel movements  · You have a fever and abdominal pain with the constipation  Contact your healthcare provider if:   · Your constipation gets worse  · You start to vomit  · You have questions or concerns about your condition or care  Medicines:   · Medicine or a fiber supplement  may help make your bowel movement softer  A laxative may help relax and loosen your intestines to help you have a bowel movement  You may also be given medicine to increase fluid in your intestines  The fluid may help move bowel movements through your intestines  · Take your medicine as directed  Contact your healthcare provider if you think your medicine is not helping or if you have side effects  Tell him of her if you are allergic to any medicine  Keep a list of the medicines, vitamins, and herbs you take  Include the amounts, and when and why you take them  Bring the list or the pill bottles to follow-up visits   Carry your medicine list with you in case of an emergency  Manage your constipation:   · Drink liquids as directed  You may need to drink extra liquids to help soften and move your bowels  Ask how much liquid to drink each day and which liquids are best for you  · Eat high-fiber foods  This may help decrease constipation by adding bulk to your bowel movements  High-fiber foods include fruit, vegetables, whole-grain breads and cereals, and beans  Your healthcare provider or dietitian can help you create a high-fiber meal plan  · Exercise regularly  Regular physical activity can help stimulate your intestines  Ask which exercises are best for you  · Schedule a time each day to have a bowel movement  This may help train your body to have regular bowel movements  Bend forward while you are on the toilet to help move the bowel movement out  Sit on the toilet for at least 10 minutes, even if you do not have a bowel movement  Follow up with your healthcare provider as directed:  Write down your questions so you remember to ask them during your visits  © 2017 [address] [facility] Information is for End User's use only and may not be sold, redistributed or otherwise used for commercial purposes  All illustrations and images included in CareNotes® are the copyrighted property of A D A M , Inc  or [email or web address]  The above information is an  only  It is not intended as medical advice for individual conditions or treatments  Talk to your doctor, nurse or pharmacist before following any medical regimen to see if it is safe and effective for you  Upper Respiratory Infection   WHAT YOU NEED TO KNOW:   An upper respiratory infection is also called the common cold  It is an infection that can affect your nose, throat, ears, and sinuses  For healthy people, the common cold is usually not serious and does not need special treatment   Cold symptoms are usually worst for the first 3 to 5 days  Most people get better in 7 to 14 days  You may continue to cough for 2 to 3 weeks  Colds are caused by viruses and do not get better with antibiotics  DISCHARGE INSTRUCTIONS:   Seek care immediately if:   · You have chest pain or trouble breathing  Contact your healthcare provider if:   · You have a fever over 102ºF (39°C)  · Your sore throat gets worse or you see white or yellow spots in your throat  · Your symptoms get worse after 3 to 5 days or your cold is not better in 14 days  · You have a rash anywhere on your skin  · You have large, tender lumps in your neck  · You have thick, green, or yellow drainage from your nose  · You cough up thick yellow, green, or bloody mucus  · You are vomiting for more than 24 hours and cannot keep fluids down  · You have a bad earache  · You have questions or concerns about your condition or care  Medicines: You may need any of the following:  · Decongestants  help reduce nasal congestion and help you breathe more easily  If you take decongestant pills, they may make you feel restless or cause problems with your sleep  Do not use decongestant sprays for more than a few days  · Cough suppressants  help reduce coughing  Ask your healthcare provider which type of cough medicine is best for you  · NSAIDs , such as ibuprofen, help decrease swelling, pain, and fever  NSAIDs can cause stomach bleeding or kidney problems in certain people  If you take blood thinner medicine, always ask your healthcare provider if NSAIDs are safe for you  Always read the medicine label and follow directions  · Acetaminophen  decreases pain and fever  It is available without a doctor's order  Ask how much to take and how often to take it  Follow directions  Read the labels of all other medicines you are using to see if they also contain acetaminophen, or ask your doctor or pharmacist  Acetaminophen can cause liver damage if not taken correctly   Do not use more than 4 grams (4,000 milligrams) total of acetaminophen in one day  · Take your medicine as directed  Contact your healthcare provider if you think your medicine is not helping or if you have side effects  Tell him or her if you are allergic to any medicine  Keep a list of the medicines, vitamins, and herbs you take  Include the amounts, and when and why you take them  Bring the list or the pill bottles to follow-up visits  Carry your medicine list with you in case of an emergency  Follow up with your healthcare provider as directed:  Write down your questions so you remember to ask them during your visits  Self-care:   · Rest as much as possible  Slowly start to do more each day  · Drink more liquids as directed  Liquids will help thin and loosen mucus so you can cough it up  Liquids will also help prevent dehydration  Liquids that help prevent dehydration include water, fruit juice, and broth  Do not drink liquids that contain caffeine  Caffeine can increase your risk for dehydration  Ask your healthcare provider how much liquid to drink each day  · Soothe a sore throat  Gargle with warm salt water  This helps your sore throat feel better  Make salt water by dissolving ¼ teaspoon salt in 1 cup warm water  You may also suck on hard candy or throat lozenges  You may use a sore throat spray  · Use a humidifier or vaporizer  Use a cool mist humidifier or a vaporizer to increase air moisture in your home  This may make it easier for you to breathe and help decrease your cough  · Use saline nasal drops as directed  These help relieve congestion  · Apply petroleum-based jelly around the outside of your nostrils  This can decrease irritation from blowing your nose  · Do not smoke  Nicotine and other chemicals in cigarettes and cigars can make your symptoms worse  They can also cause infections such as bronchitis or pneumonia   Ask your healthcare provider for information if you currently smoke and need help to quit  E-cigarettes or smokeless tobacco still contain nicotine  Talk to your healthcare provider before you use these products  Prevent spreading your cold to others:   · Try to stay away from other people during the first 2 to 3 days of your cold when it is more easily spread  · Do not share food or drinks  · Do not share hand towels with household members  · Wash your hands often, especially after you blow your nose  Turn away from other people and cover your mouth and nose with a tissue when you sneeze or cough  © 2017 [facility] Information is for End User's use only and may not be sold, redistributed or otherwise used for commercial purposes  All illustrations and images included in CareNotes® are the copyrighted property of [employer]  or Chandana Lee  The above information is an  only  It is not intended as medical advice for individual conditions or treatments  Talk to your doctor, nurse or pharmacist before following any medical regimen to see if it is safe and effective for you

## 2019-03-15 ENCOUNTER — TELEPHONE (OUTPATIENT)
Dept: OTHER | Facility: OTHER | Age: 54
End: 2019-03-15

## 2019-03-15 DIAGNOSIS — IMO0002 UNCONTROLLED TYPE 2 DIABETES MELLITUS, WITHOUT LONG-TERM CURRENT USE OF INSULIN: ICD-10-CM

## 2019-03-15 NOTE — TELEPHONE ENCOUNTER
Riley Hassan 1965  CONFIDENTIALTY NOTICE: This fax transmission is intended only for the addressee  It contains information that is legally privileged,  confidential or otherwise protected from use or disclosure  If you are not the intended recipient, you are strictly prohibited from reviewing,  disclosing, copying using or disseminating any of this information or taking any action in reliance on or regarding this information  If you have  received this fax in error, please notify us immediately by telephone so that we can arrange for its return to us  Page:   Call Id: [id number]  Health Call  Standard Call Report  Health Call  Patient Name: Riley Hassan  Gender: Female  : 1965  Age: 47 Y 6 M 13 D  Return Phone  Number: [phone] (Current)  Address: [address] [place] [address]  City/State/[facility]: [place] [address]  Practice Name: [address] [place] [address],[address]  Floor Charged:  Physician:  [facility] Name:  Relationship To  Patient: Self  Return Phone Number: [phone] (Current)  Presenting Problem: " I do not have any metformin left my  doctor was supposed to call it in  "  Service Type: Prescription Refills  Charged Service 1: Prescription Refills  Pharmacy Name and  Number:  Nurse Assessment  Protocols  Protocol Title Nurse Date/Time  Disp  Time Disposition Final User  3/15/2019 4:09:05 PM Send to [facility]  3/15/2019 7:51:17 PM Close Yes Myles Doshi  Comments  User: Idania Tang RN Date/Time: 3/15/2019 7:51:12 PM  The pharmacy gave her 6 tablets to last her until Monday  Please complete prescription  It did not make it to the pharmacy

## 2019-03-19 DIAGNOSIS — IMO0002 UNCONTROLLED TYPE 2 DIABETES MELLITUS, WITHOUT LONG-TERM CURRENT USE OF INSULIN: ICD-10-CM

## 2019-03-27 ENCOUNTER — OFFICE VISIT (OUTPATIENT)
Dept: FAMILY MEDICINE CLINIC | Facility: CLINIC | Age: 54
End: 2019-03-27
Payer: COMMERCIAL

## 2019-03-27 VITALS
DIASTOLIC BLOOD PRESSURE: 80 MMHG | BODY MASS INDEX: 21.97 KG/M2 | HEIGHT: 63 IN | TEMPERATURE: 98.2 F | OXYGEN SATURATION: 98 % | SYSTOLIC BLOOD PRESSURE: 122 MMHG | WEIGHT: 124 LBS | HEART RATE: 72 BPM

## 2019-03-27 DIAGNOSIS — A69.20 LYME DISEASE: ICD-10-CM

## 2019-03-27 DIAGNOSIS — E78.5 DYSLIPIDEMIA: ICD-10-CM

## 2019-03-27 DIAGNOSIS — K21.9 GASTROESOPHAGEAL REFLUX DISEASE WITHOUT ESOPHAGITIS: ICD-10-CM

## 2019-03-27 DIAGNOSIS — Z12.31 BREAST CANCER SCREENING BY MAMMOGRAM: Primary | ICD-10-CM

## 2019-03-27 DIAGNOSIS — Z12.31 VISIT FOR SCREENING MAMMOGRAM: ICD-10-CM

## 2019-03-27 DIAGNOSIS — I63.9 CEREBROVASCULAR ACCIDENT (CVA), UNSPECIFIED MECHANISM (HCC): ICD-10-CM

## 2019-03-27 DIAGNOSIS — M19.90 ARTHRITIS: ICD-10-CM

## 2019-03-27 DIAGNOSIS — IMO0002 UNCONTROLLED TYPE 2 DIABETES MELLITUS, WITHOUT LONG-TERM CURRENT USE OF INSULIN: ICD-10-CM

## 2019-03-27 DIAGNOSIS — I10 ESSENTIAL HYPERTENSION: ICD-10-CM

## 2019-03-27 PROCEDURE — 3074F SYST BP LT 130 MM HG: CPT | Performed by: FAMILY MEDICINE

## 2019-03-27 PROCEDURE — 99213 OFFICE O/P EST LOW 20 MIN: CPT | Performed by: FAMILY MEDICINE

## 2019-03-27 PROCEDURE — 3079F DIAST BP 80-89 MM HG: CPT | Performed by: FAMILY MEDICINE

## 2019-03-27 NOTE — ASSESSMENT & PLAN NOTE
History of arthritis rheumatoid and osteoarthritis by history will further evaluate and research old records  No current flare ups or symptoms at this time and will follow up at next office visit

## 2019-03-27 NOTE — ASSESSMENT & PLAN NOTE
Mixed hyperlipidemia continue on atorvastatin follow-up closely  Lipid profile will be due by Mervat

## 2019-03-27 NOTE — ASSESSMENT & PLAN NOTE
GERD under relatively good control continue with current medication and follow up at next office visit

## 2019-03-27 NOTE — ASSESSMENT & PLAN NOTE
Lab Results   Component Value Date    HGBA1C 11 5 (H) 02/13/2019       No results for input(s): POCGLU in the last 72 hours  Blood Sugar Average: Last 72 hrs:   diabetes under poor control with A1c at 11 5 she has been losing weight and is requesting further treatment plan at this point she requires additional medication  Patient has fluctuating blood sugars throughout the day difficult to control cording to her history at this time  This is her 1st visit with me and I will review old records  I do recommend diabetic counseling and endocrinology evaluation

## 2019-03-27 NOTE — ASSESSMENT & PLAN NOTE
Hypertension under relatively good control at 122/80 on ACE-inhibitor continue this medication at this time

## 2019-03-27 NOTE — ASSESSMENT & PLAN NOTE
History of CVA on current medications low-dose aspirin at this time and no neurologic deficits will follow up at next office visit she will see Neurology as scheduled

## 2019-03-27 NOTE — PATIENT INSTRUCTIONS
10% - bad control"> 10% - bad control,Hemoglobin A1c (HbA1c) greater than 10% indicating poor diabetic control,Haemoglobin A1c greater than 10% indicating poor diabetic control">   Diabetes Mellitus Type 2 in Adults, Ambulatory Care   GENERAL INFORMATION:   Diabetes mellitus type 2  is a disease that affects how your body uses glucose (sugar)  Insulin helps move sugar out of the blood so it can be used for energy  Normally, when the blood sugar level increases, the pancreas makes more insulin  Type 2 diabetes develops because either the body cannot make enough insulin, or it cannot use the insulin correctly  After many years, your pancreas may stop making insulin  Common symptoms include the following:   · More hunger or thirst than usual     · Frequent urination     · Weight loss without trying     · Blurred vision  Seek immediate care for the following symptoms:   · Severe abdominal pain, or pain that spreads to your back  You may also be vomiting  · Trouble staying awake or focusing    · Shaking or sweating    · Blurred or double vision    · Breath has a fruity, sweet smell    · Breathing is deep and labored, or rapid and shallow    · Heartbeat is fast and weak  Treatment for diabetes mellitus type 2  includes keeping your blood sugar at a normal level  You must eat the right foods, and exercise regularly  You may also need medicine if you cannot control your blood sugar level with nutrition and exercise  Manage diabetes mellitus type 2:   · Check your blood sugar level  You will be taught how to check a small drop of blood in a glucose monitor  Ask your healthcare provider when and how often to check during the day  Ask your healthcare provider what your blood sugar levels should be when you check them  · Keep track of carbohydrates (sugar and starchy foods)  Your blood sugar level can get too high if you eat too many carbohydrates   Your dietitian will help you plan meals and snacks that have the right amount of carbohydrates  · Eat low-fat foods  Some examples are skinless chicken and low-fat milk  · Eat less sodium (salt)  Some examples of high-sodium foods to limit are soy sauce, potato chips, and soup  Do not add salt to food you cook  Limit your use of table salt  · Eat high-fiber foods  Foods that are a good source of fiber include vegetables, whole grain bread, and beans  · Limit alcohol  Alcohol affects your blood sugar level and can make it harder to manage your diabetes  Women should limit alcohol to 1 drink a day  Men should limit alcohol to 2 drinks a day  A drink of alcohol is 12 ounces of beer, 5 ounces of wine, or 1½ ounces of liquor  · Get regular exercise  Exercise can help keep your blood sugar level steady, decrease your risk of heart disease, and help you lose weight  Exercise for at least 30 minutes, 5 days a week  Include muscle strengthening activities 2 days each week  Work with your healthcare provider to create an exercise plan  · Check your feet each day  for injuries or open sores  Ask your healthcare provider for activities you can do if you have an open sore  · Quit smoking  If you smoke, it is never too late to quit  Smoking can worsen the problems that may occur with diabetes  Ask your healthcare provider for information about how to stop smoking if you are having trouble quitting  · Ask about your weight:  Ask healthcare providers if you need to lose weight, and how much to lose  Ask them to help you with a weight loss program  Even a 10 to 15 pound weight loss can help you manage your blood sugar level  · Carry medical alert identification  Wear medical alert jewelry or carry a card that says you have diabetes  Ask your healthcare provider where to get these items  · Ask about vaccines  Diabetes puts you at risk of serious illness if you get the flu, pneumonia, or hepatitis   Ask your healthcare provider if you should get a flu, pneumonia, or hepatitis B vaccine, and when to get the vaccine  Follow up with your healthcare provider as directed:  Write down your questions so you remember to ask them during your visits  CARE AGREEMENT:   You have the right to help plan your care  Learn about your health condition and how it may be treated  Discuss treatment options with your caregivers to decide what care you want to receive  You always have the right to refuse treatment  The above information is an  only  It is not intended as medical advice for individual conditions or treatments  Talk to your doctor, nurse or pharmacist before following any medical regimen to see if it is safe and effective for you  © 2014 [address] is for End User's use only and may not be sold, redistributed or otherwise used for commercial purposes  All illustrations and images included in CareNotes® are the copyrighted property of A D A M , Inc  or Chandana Lee

## 2019-03-27 NOTE — PROGRESS NOTES
Assessment/Plan:       Problem List Items Addressed This Visit        Digestive    GERD (gastroesophageal reflux disease)     GERD under relatively good control continue with current medication and follow up at next office visit            Endocrine    Uncontrolled type 2 diabetes mellitus, without long-term current use of insulin ([facility] [address] )     Lab Results   Component Value Date    HGBA1C 11 5 (H) 02/13/2019       No results for input(s): POCGLU in the last 72 hours  Blood Sugar Average: Last 72 hrs:   diabetes under poor control with A1c at 11 5 she has been losing weight and is requesting further treatment plan at this point she requires additional medication  Patient has fluctuating blood sugars throughout the day difficult to control cording to her history at this time  This is her 1st visit with me and I will review old records  I do recommend diabetic counseling and endocrinology evaluation  Relevant Orders    Ambulatory referral to Endocrinology    Ambulatory referral to Diabetic Education    CBC and differential    Comprehensive metabolic panel    Hemoglobin A1C    Lipid panel    Microalbumin / creatinine urine ratio    TSH, 3rd generation with Free T4 reflex       Cardiovascular and Mediastinum    Hypertension      Hypertension under relatively good control at 122/80 on ACE-inhibitor continue this medication at this time         Stroke [facility])     History of CVA on current medications low-dose aspirin at this time and no neurologic deficits will follow up at next office visit she will see Neurology as scheduled            Musculoskeletal and Integument    Arthritis     History of arthritis rheumatoid and osteoarthritis by history will further evaluate and research old records  No current flare ups or symptoms at this time and will follow up at next office visit  Other    Dyslipidemia     Mixed hyperlipidemia continue on atorvastatin follow-up closely    Lipid profile will be due by June          Relevant Orders    Lipid panel    Breast cancer screening by mammogram - Primary    Relevant Orders    Mammo screening bilateral w cad      Other Visit Diagnoses     Visit for screening mammogram        Lyme disease        Relevant Orders    Lyme Antibody Profile with reflex to WB            Subjective:      Patient ID: Brett Summers is a 47 y o  female  Patient is here today presenting for office visit appointment establish with new provider  Her family has been here over the years in the past   She has been losing weight and is concerned about her diabetes  Also requesting a mammogram referral      The following portions of the patient's history were reviewed and updated as appropriate: allergies, current medications, past family history, past medical history, past social history, past surgical history and problem list     Review of Systems   Constitutional: Negative for chills, fatigue and fever  HENT: Negative for congestion, nosebleeds, rhinorrhea, sinus pressure and sore throat  Eyes: Negative for discharge and redness  Respiratory: Negative for cough and shortness of breath  Cardiovascular: Negative for chest pain, palpitations and leg swelling  Gastrointestinal: Negative for abdominal pain, blood in stool and nausea  Endocrine: Negative for cold intolerance, heat intolerance and polyuria  Genitourinary: Negative for dysuria and frequency  Musculoskeletal: Negative for arthralgias, back pain and myalgias  Skin: Negative for rash  Neurological: Negative for dizziness, weakness and headaches  Hematological: Negative for adenopathy  Psychiatric/Behavioral: Negative for behavioral problems and sleep disturbance  The patient is not nervous/anxious            Objective:      /80 (BP Location: Left arm, Patient Position: Sitting)   Pulse 72   Temp 98 2 °F (36 8 °C) (Tympanic)   Ht 5' 3" (1 6 m)   Wt 56 2 kg (124 lb)   SpO2 98%   BMI 21 97 kg/m² Physical Exam   Constitutional: She is oriented to person, place, and time  She appears well-developed and well-nourished  No distress  HENT:   Head: Normocephalic and atraumatic  Right Ear: External ear normal    Left Ear: External ear normal    Nose: Nose normal    Mouth/Throat: Oropharynx is clear and moist  No oropharyngeal exudate  Eyes: Pupils are equal, round, and reactive to light  Conjunctivae and EOM are normal  Right eye exhibits no discharge  Left eye exhibits no discharge  No scleral icterus  Neck: Normal range of motion  No JVD present  No thyromegaly present  Cardiovascular: Normal rate, regular rhythm and normal heart sounds  No murmur heard  Pulmonary/Chest: Effort normal  She has no wheezes  She has no rales  She exhibits no tenderness  Abdominal: Soft  Bowel sounds are normal  She exhibits no distension and no mass  There is no tenderness  Musculoskeletal: Normal range of motion  She exhibits no edema, tenderness or deformity  Lymphadenopathy:     She has no cervical adenopathy  Neurological: She is alert and oriented to person, place, and time  She has normal reflexes  She displays normal reflexes  No cranial nerve deficit  Coordination normal    Skin: Skin is warm and dry  No rash noted  Psychiatric: She has a normal mood and affect  Her behavior is normal  Judgment and thought content normal    Nursing note and vitals reviewed         Data:    Laboratory Results:   Radiology/Other Diagnostic Testing Results:      Lab Results   Component Value Date    WBC 6 79 03/08/2019    HGB 13 9 03/08/2019    HCT 41 8 03/08/2019    MCV 86 03/08/2019     03/08/2019     Lab Results   Component Value Date     05/13/2015    K 4 5 03/08/2019     03/08/2019    CO2 29 03/08/2019    ANIONGAP 5 05/13/2015    BUN 11 03/08/2019    CREATININE 0 68 03/08/2019    GLUCOSE 194 (H) 05/13/2015    GLUF 148 (H) 10/21/2017    CALCIUM 9 2 03/08/2019    AST 11 03/08/2019    ALT 26 03/08/2019    ALKPHOS 119 (H) 03/08/2019    PROT 7 8 05/13/2015    BILITOT 0 3 05/13/2015    EGFR 101 03/08/2019     Lab Results   Component Value Date    CHOLESTEROL 169 10/21/2017    CHOLESTEROL 188 12/19/2016     Lab Results   Component Value Date    HDL 51 10/21/2017    HDL 60 12/19/2016     Lab Results   Component Value Date    LDLCALC 82 10/21/2017    LDLCALC 100 12/19/2016     Lab Results   Component Value Date    TRIG 179 (H) 10/21/2017    TRIG 140 12/19/2016     No results found for: CHOLHDL  No results found for: [id number], TSH  Lab Results   Component Value Date    HGBA1C 11 5 (H) 02/13/2019     No results found for: SATINDER Owens, DO

## 2019-03-28 ENCOUNTER — TELEPHONE (OUTPATIENT)
Dept: FAMILY MEDICINE CLINIC | Facility: CLINIC | Age: 54
End: 2019-03-28

## 2019-04-09 ENCOUNTER — OFFICE VISIT (OUTPATIENT)
Dept: SURGERY | Facility: CLINIC | Age: 54
End: 2019-04-09
Payer: COMMERCIAL

## 2019-04-09 VITALS
SYSTOLIC BLOOD PRESSURE: 102 MMHG | BODY MASS INDEX: 22.68 KG/M2 | RESPIRATION RATE: 12 BRPM | DIASTOLIC BLOOD PRESSURE: 60 MMHG | TEMPERATURE: 97.9 F | WEIGHT: 128 LBS | HEIGHT: 63 IN | HEART RATE: 70 BPM

## 2019-04-09 DIAGNOSIS — K64.9 BLEEDING HEMORRHOIDS: Primary | ICD-10-CM

## 2019-04-09 PROCEDURE — 99213 OFFICE O/P EST LOW 20 MIN: CPT | Performed by: SURGERY

## 2019-05-02 ENCOUNTER — HOSPITAL ENCOUNTER (EMERGENCY)
Facility: HOSPITAL | Age: 54
Discharge: HOME | End: 2019-05-02
Attending: EMERGENCY MEDICINE | Admitting: EMERGENCY MEDICINE
Payer: COMMERCIAL

## 2019-05-02 ENCOUNTER — APPOINTMENT (EMERGENCY)
Dept: CT IMAGING | Facility: HOSPITAL | Age: 54
End: 2019-05-02
Payer: COMMERCIAL

## 2019-05-02 VITALS
SYSTOLIC BLOOD PRESSURE: 133 MMHG | HEART RATE: 84 BPM | OXYGEN SATURATION: 97 % | TEMPERATURE: 98 F | RESPIRATION RATE: 18 BRPM | DIASTOLIC BLOOD PRESSURE: 63 MMHG

## 2019-05-02 DIAGNOSIS — R10.31 RIGHT LOWER QUADRANT ABDOMINAL PAIN: Primary | ICD-10-CM

## 2019-05-02 DIAGNOSIS — T14.8XXA MUSCLE STRAIN: ICD-10-CM

## 2019-05-02 LAB
ALBUMIN SERPL BCP-MCNC: 3.8 G/DL (ref 3.5–5)
ALP SERPL-CCNC: 102 U/L (ref 46–116)
ALT SERPL W P-5'-P-CCNC: 28 U/L (ref 12–78)
ANION GAP SERPL CALCULATED.3IONS-SCNC: 9 MMOL/L (ref 4–13)
AST SERPL W P-5'-P-CCNC: 12 U/L (ref 5–45)
BACTERIA UR QL AUTO: ABNORMAL /HPF
BASOPHILS # BLD AUTO: 0.01 THOUSANDS/ΜL (ref 0–0.1)
BASOPHILS NFR BLD AUTO: 0 % (ref 0–1)
BILIRUB DIRECT SERPL-MCNC: 0.09 MG/DL (ref 0–0.2)
BILIRUB SERPL-MCNC: 0.4 MG/DL (ref 0.2–1)
BILIRUB UR QL STRIP: NEGATIVE
BUN SERPL-MCNC: 18 MG/DL (ref 5–25)
CALCIUM SERPL-MCNC: 9.2 MG/DL (ref 8.3–10.1)
CHLORIDE SERPL-SCNC: 102 MMOL/L (ref 100–108)
CLARITY UR: CLEAR
CO2 SERPL-SCNC: 28 MMOL/L (ref 21–32)
COLOR UR: YELLOW
CREAT SERPL-MCNC: 0.75 MG/DL (ref 0.6–1.3)
EOSINOPHIL # BLD AUTO: 0.12 THOUSAND/ΜL (ref 0–0.61)
EOSINOPHIL NFR BLD AUTO: 2 % (ref 0–6)
ERYTHROCYTE [DISTWIDTH] IN BLOOD BY AUTOMATED COUNT: 12.1 % (ref 11.6–15.1)
GFR SERPL CREATININE-BSD FRML MDRD: 91 ML/MIN/1.73SQ M
GLUCOSE SERPL-MCNC: 144 MG/DL (ref 65–140)
GLUCOSE SERPL-MCNC: 165 MG/DL (ref 65–140)
GLUCOSE UR STRIP-MCNC: NEGATIVE MG/DL
HCT VFR BLD AUTO: 40.3 % (ref 34.8–46.1)
HGB BLD-MCNC: 13.5 G/DL (ref 11.5–15.4)
HGB UR QL STRIP.AUTO: NEGATIVE
IMM GRANULOCYTES # BLD AUTO: 0.01 THOUSAND/UL (ref 0–0.2)
IMM GRANULOCYTES NFR BLD AUTO: 0 % (ref 0–2)
KETONES UR STRIP-MCNC: ABNORMAL MG/DL
LEUKOCYTE ESTERASE UR QL STRIP: ABNORMAL
LIPASE SERPL-CCNC: 126 U/L (ref 73–393)
LYMPHOCYTES # BLD AUTO: 4.27 THOUSANDS/ΜL (ref 0.6–4.47)
LYMPHOCYTES NFR BLD AUTO: 56 % (ref 14–44)
MCH RBC QN AUTO: 28.9 PG (ref 26.8–34.3)
MCHC RBC AUTO-ENTMCNC: 33.5 G/DL (ref 31.4–37.4)
MCV RBC AUTO: 86 FL (ref 82–98)
MONOCYTES # BLD AUTO: 0.39 THOUSAND/ΜL (ref 0.17–1.22)
MONOCYTES NFR BLD AUTO: 5 % (ref 4–12)
NEUTROPHILS # BLD AUTO: 2.84 THOUSANDS/ΜL (ref 1.85–7.62)
NEUTS SEG NFR BLD AUTO: 37 % (ref 43–75)
NITRITE UR QL STRIP: NEGATIVE
NON-SQ EPI CELLS URNS QL MICRO: ABNORMAL /HPF
NRBC BLD AUTO-RTO: 0 /100 WBCS
PH UR STRIP.AUTO: 5.5 [PH]
PLATELET # BLD AUTO: 190 THOUSANDS/UL (ref 149–390)
PMV BLD AUTO: 9.6 FL (ref 8.9–12.7)
POTASSIUM SERPL-SCNC: 4 MMOL/L (ref 3.5–5.3)
PROT SERPL-MCNC: 7.6 G/DL (ref 6.4–8.2)
PROT UR STRIP-MCNC: NEGATIVE MG/DL
RBC # BLD AUTO: 4.67 MILLION/UL (ref 3.81–5.12)
RBC #/AREA URNS AUTO: ABNORMAL /HPF
SODIUM SERPL-SCNC: 139 MMOL/L (ref 136–145)
SP GR UR STRIP.AUTO: >=1.03 (ref 1–1.03)
UROBILINOGEN UR QL STRIP.AUTO: 0.2 E.U./DL
WBC # BLD AUTO: 7.64 THOUSAND/UL (ref 4.31–10.16)
WBC #/AREA URNS AUTO: ABNORMAL /HPF

## 2019-05-02 PROCEDURE — 36415 COLL VENOUS BLD VENIPUNCTURE: CPT | Performed by: EMERGENCY MEDICINE

## 2019-05-02 PROCEDURE — 74177 CT ABD & PELVIS W/CONTRAST: CPT

## 2019-05-02 PROCEDURE — 81001 URINALYSIS AUTO W/SCOPE: CPT | Performed by: EMERGENCY MEDICINE

## 2019-05-02 PROCEDURE — 80048 BASIC METABOLIC PNL TOTAL CA: CPT | Performed by: EMERGENCY MEDICINE

## 2019-05-02 PROCEDURE — 96375 TX/PRO/DX INJ NEW DRUG ADDON: CPT

## 2019-05-02 PROCEDURE — 99284 EMERGENCY DEPT VISIT MOD MDM: CPT | Performed by: EMERGENCY MEDICINE

## 2019-05-02 PROCEDURE — 83690 ASSAY OF LIPASE: CPT | Performed by: EMERGENCY MEDICINE

## 2019-05-02 PROCEDURE — 80076 HEPATIC FUNCTION PANEL: CPT | Performed by: EMERGENCY MEDICINE

## 2019-05-02 PROCEDURE — 96361 HYDRATE IV INFUSION ADD-ON: CPT

## 2019-05-02 PROCEDURE — 99284 EMERGENCY DEPT VISIT MOD MDM: CPT

## 2019-05-02 PROCEDURE — 82948 REAGENT STRIP/BLOOD GLUCOSE: CPT

## 2019-05-02 PROCEDURE — 85025 COMPLETE CBC W/AUTO DIFF WBC: CPT | Performed by: EMERGENCY MEDICINE

## 2019-05-02 PROCEDURE — 96374 THER/PROPH/DIAG INJ IV PUSH: CPT

## 2019-05-02 RX ORDER — NAPROXEN 500 MG/1
500 TABLET ORAL 2 TIMES DAILY WITH MEALS
Qty: 20 TABLET | Refills: 0 | Status: SHIPPED | OUTPATIENT
Start: 2019-05-02 | End: 2019-06-26 | Stop reason: HOSPADM

## 2019-05-02 RX ORDER — HYDROMORPHONE HCL/PF 1 MG/ML
1 SYRINGE (ML) INJECTION ONCE
Status: COMPLETED | OUTPATIENT
Start: 2019-05-02 | End: 2019-05-02

## 2019-05-02 RX ORDER — ACETAMINOPHEN 325 MG/1
975 TABLET ORAL ONCE
Status: COMPLETED | OUTPATIENT
Start: 2019-05-02 | End: 2019-05-02

## 2019-05-02 RX ORDER — KETOROLAC TROMETHAMINE 30 MG/ML
15 INJECTION, SOLUTION INTRAMUSCULAR; INTRAVENOUS ONCE
Status: COMPLETED | OUTPATIENT
Start: 2019-05-02 | End: 2019-05-02

## 2019-05-02 RX ORDER — DIAZEPAM 5 MG/1
5 TABLET ORAL EVERY 8 HOURS PRN
Qty: 12 TABLET | Refills: 0 | Status: SHIPPED | OUTPATIENT
Start: 2019-05-02 | End: 2019-06-26 | Stop reason: HOSPADM

## 2019-05-02 RX ADMIN — KETOROLAC TROMETHAMINE 15 MG: 30 INJECTION, SOLUTION INTRAMUSCULAR at 18:18

## 2019-05-02 RX ADMIN — ACETAMINOPHEN 975 MG: 325 TABLET, FILM COATED ORAL at 20:40

## 2019-05-02 RX ADMIN — SODIUM CHLORIDE 1000 ML: 0.9 INJECTION, SOLUTION INTRAVENOUS at 18:14

## 2019-05-02 RX ADMIN — HYDROMORPHONE HYDROCHLORIDE 1 MG: 1 INJECTION, SOLUTION INTRAMUSCULAR; INTRAVENOUS; SUBCUTANEOUS at 18:18

## 2019-05-02 RX ADMIN — IOHEXOL 100 ML: 350 INJECTION, SOLUTION INTRAVENOUS at 20:20

## 2019-05-09 ENCOUNTER — TELEPHONE (OUTPATIENT)
Dept: FAMILY MEDICINE CLINIC | Facility: CLINIC | Age: 54
End: 2019-05-09

## 2019-05-13 ENCOUNTER — APPOINTMENT (OUTPATIENT)
Dept: LAB | Facility: CLINIC | Age: 54
End: 2019-05-13
Payer: COMMERCIAL

## 2019-05-13 DIAGNOSIS — E78.5 DYSLIPIDEMIA: ICD-10-CM

## 2019-05-13 DIAGNOSIS — A69.20 LYME DISEASE: ICD-10-CM

## 2019-05-13 DIAGNOSIS — IMO0002 UNCONTROLLED TYPE 2 DIABETES MELLITUS, WITHOUT LONG-TERM CURRENT USE OF INSULIN: ICD-10-CM

## 2019-05-13 LAB
ALBUMIN SERPL BCP-MCNC: 3.8 G/DL (ref 3.5–5)
ALP SERPL-CCNC: 101 U/L (ref 46–116)
ALT SERPL W P-5'-P-CCNC: 32 U/L (ref 12–78)
ANION GAP SERPL CALCULATED.3IONS-SCNC: 4 MMOL/L (ref 4–13)
AST SERPL W P-5'-P-CCNC: 13 U/L (ref 5–45)
BASOPHILS # BLD AUTO: 0.01 THOUSANDS/ΜL (ref 0–0.1)
BASOPHILS NFR BLD AUTO: 0 % (ref 0–1)
BILIRUB SERPL-MCNC: 0.55 MG/DL (ref 0.2–1)
BUN SERPL-MCNC: 13 MG/DL (ref 5–25)
CALCIUM SERPL-MCNC: 9.3 MG/DL (ref 8.3–10.1)
CHLORIDE SERPL-SCNC: 104 MMOL/L (ref 100–108)
CHOLEST SERPL-MCNC: 203 MG/DL (ref 50–200)
CO2 SERPL-SCNC: 29 MMOL/L (ref 21–32)
CREAT SERPL-MCNC: 0.74 MG/DL (ref 0.6–1.3)
CREAT UR-MCNC: 86.7 MG/DL
EOSINOPHIL # BLD AUTO: 0.12 THOUSAND/ΜL (ref 0–0.61)
EOSINOPHIL NFR BLD AUTO: 3 % (ref 0–6)
ERYTHROCYTE [DISTWIDTH] IN BLOOD BY AUTOMATED COUNT: 12.1 % (ref 11.6–15.1)
EST. AVERAGE GLUCOSE BLD GHB EST-MCNC: 200 MG/DL
GFR SERPL CREATININE-BSD FRML MDRD: 93 ML/MIN/1.73SQ M
GLUCOSE P FAST SERPL-MCNC: 174 MG/DL (ref 65–99)
HBA1C MFR BLD: 8.6 % (ref 4.2–6.3)
HCT VFR BLD AUTO: 43.3 % (ref 34.8–46.1)
HDLC SERPL-MCNC: 54 MG/DL (ref 40–60)
HGB BLD-MCNC: 13.8 G/DL (ref 11.5–15.4)
IMM GRANULOCYTES # BLD AUTO: 0.01 THOUSAND/UL (ref 0–0.2)
IMM GRANULOCYTES NFR BLD AUTO: 0 % (ref 0–2)
LDLC SERPL CALC-MCNC: 107 MG/DL (ref 0–100)
LYMPHOCYTES # BLD AUTO: 2.52 THOUSANDS/ΜL (ref 0.6–4.47)
LYMPHOCYTES NFR BLD AUTO: 51 % (ref 14–44)
MCH RBC QN AUTO: 28.4 PG (ref 26.8–34.3)
MCHC RBC AUTO-ENTMCNC: 31.9 G/DL (ref 31.4–37.4)
MCV RBC AUTO: 89 FL (ref 82–98)
MICROALBUMIN UR-MCNC: 5.7 MG/L (ref 0–20)
MICROALBUMIN/CREAT 24H UR: 7 MG/G CREATININE (ref 0–30)
MONOCYTES # BLD AUTO: 0.37 THOUSAND/ΜL (ref 0.17–1.22)
MONOCYTES NFR BLD AUTO: 8 % (ref 4–12)
NEUTROPHILS # BLD AUTO: 1.84 THOUSANDS/ΜL (ref 1.85–7.62)
NEUTS SEG NFR BLD AUTO: 38 % (ref 43–75)
NONHDLC SERPL-MCNC: 149 MG/DL
NRBC BLD AUTO-RTO: 0 /100 WBCS
PLATELET # BLD AUTO: 183 THOUSANDS/UL (ref 149–390)
PMV BLD AUTO: 10.2 FL (ref 8.9–12.7)
POTASSIUM SERPL-SCNC: 4.5 MMOL/L (ref 3.5–5.3)
PROT SERPL-MCNC: 7.7 G/DL (ref 6.4–8.2)
RBC # BLD AUTO: 4.86 MILLION/UL (ref 3.81–5.12)
SODIUM SERPL-SCNC: 137 MMOL/L (ref 136–145)
TRIGL SERPL-MCNC: 211 MG/DL
TSH SERPL DL<=0.05 MIU/L-ACNC: 1.07 UIU/ML (ref 0.36–3.74)
WBC # BLD AUTO: 4.87 THOUSAND/UL (ref 4.31–10.16)

## 2019-05-13 PROCEDURE — 86618 LYME DISEASE ANTIBODY: CPT

## 2019-05-13 PROCEDURE — 82570 ASSAY OF URINE CREATININE: CPT | Performed by: PHYSICIAN ASSISTANT

## 2019-05-13 PROCEDURE — 36415 COLL VENOUS BLD VENIPUNCTURE: CPT

## 2019-05-13 PROCEDURE — 83036 HEMOGLOBIN GLYCOSYLATED A1C: CPT | Performed by: PHYSICIAN ASSISTANT

## 2019-05-13 PROCEDURE — 82043 UR ALBUMIN QUANTITATIVE: CPT | Performed by: PHYSICIAN ASSISTANT

## 2019-05-13 PROCEDURE — 3061F NEG MICROALBUMINURIA REV: CPT | Performed by: FAMILY MEDICINE

## 2019-05-13 PROCEDURE — 80061 LIPID PANEL: CPT | Performed by: PHYSICIAN ASSISTANT

## 2019-05-13 PROCEDURE — 85025 COMPLETE CBC W/AUTO DIFF WBC: CPT

## 2019-05-13 PROCEDURE — 84443 ASSAY THYROID STIM HORMONE: CPT

## 2019-05-13 PROCEDURE — 86617 LYME DISEASE ANTIBODY: CPT

## 2019-05-13 PROCEDURE — 80053 COMPREHEN METABOLIC PANEL: CPT

## 2019-05-15 LAB
B BURGDOR IGG SER IA-ACNC: 0.9
B BURGDOR IGM SER IA-ACNC: 1.28

## 2019-05-16 LAB
B BURGDOR IGG PATRN SER IB-IMP: NEGATIVE
B BURGDOR IGM PATRN SER IB-IMP: POSITIVE
B BURGDOR18KD IGG SER QL IB: PRESENT
B BURGDOR23KD IGG SER QL IB: PRESENT
B BURGDOR23KD IGM SER QL IB: PRESENT
B BURGDOR28KD IGG SER QL IB: ABNORMAL
B BURGDOR30KD IGG SER QL IB: ABNORMAL
B BURGDOR39KD IGG SER QL IB: ABNORMAL
B BURGDOR39KD IGM SER QL IB: PRESENT
B BURGDOR41KD IGG SER QL IB: ABNORMAL
B BURGDOR41KD IGM SER QL IB: PRESENT
B BURGDOR45KD IGG SER QL IB: ABNORMAL
B BURGDOR58KD IGG SER QL IB: ABNORMAL
B BURGDOR66KD IGG SER QL IB: ABNORMAL
B BURGDOR93KD IGG SER QL IB: ABNORMAL

## 2019-05-29 ENCOUNTER — HOSPITAL ENCOUNTER (OUTPATIENT)
Dept: RADIOLOGY | Facility: MEDICAL CENTER | Age: 54
Discharge: HOME | End: 2019-05-29

## 2019-05-29 DIAGNOSIS — Z12.31 BREAST CANCER SCREENING BY MAMMOGRAM: ICD-10-CM

## 2019-06-17 DIAGNOSIS — IMO0002 UNCONTROLLED TYPE 2 DIABETES MELLITUS, WITHOUT LONG-TERM CURRENT USE OF INSULIN: ICD-10-CM

## 2019-06-26 ENCOUNTER — OFFICE VISIT (OUTPATIENT)
Dept: FAMILY MEDICINE CLINIC | Facility: CLINIC | Age: 54
End: 2019-06-26
Payer: COMMERCIAL

## 2019-06-26 VITALS
OXYGEN SATURATION: 97 % | WEIGHT: 130 LBS | DIASTOLIC BLOOD PRESSURE: 64 MMHG | BODY MASS INDEX: 23.04 KG/M2 | SYSTOLIC BLOOD PRESSURE: 120 MMHG | HEART RATE: 84 BPM | HEIGHT: 63 IN

## 2019-06-26 DIAGNOSIS — E78.2 MIXED HYPERLIPIDEMIA: ICD-10-CM

## 2019-06-26 DIAGNOSIS — IMO0002 UNCONTROLLED TYPE 2 DIABETES MELLITUS, WITHOUT LONG-TERM CURRENT USE OF INSULIN: Primary | ICD-10-CM

## 2019-06-26 DIAGNOSIS — K21.9 GASTROESOPHAGEAL REFLUX DISEASE WITHOUT ESOPHAGITIS: ICD-10-CM

## 2019-06-26 DIAGNOSIS — I10 ESSENTIAL HYPERTENSION: ICD-10-CM

## 2019-06-26 PROCEDURE — 3078F DIAST BP <80 MM HG: CPT | Performed by: FAMILY MEDICINE

## 2019-06-26 PROCEDURE — 3074F SYST BP LT 130 MM HG: CPT | Performed by: FAMILY MEDICINE

## 2019-06-26 PROCEDURE — 99214 OFFICE O/P EST MOD 30 MIN: CPT | Performed by: FAMILY MEDICINE

## 2019-06-26 PROCEDURE — 1036F TOBACCO NON-USER: CPT | Performed by: FAMILY MEDICINE

## 2019-06-26 PROCEDURE — 3008F BODY MASS INDEX DOCD: CPT | Performed by: FAMILY MEDICINE

## 2019-06-26 RX ORDER — ATORVASTATIN CALCIUM 10 MG/1
10 TABLET, FILM COATED ORAL DAILY
Qty: 30 TABLET | Refills: 5 | Status: CANCELLED | OUTPATIENT
Start: 2019-06-26

## 2019-09-30 ENCOUNTER — OFFICE VISIT (OUTPATIENT)
Dept: FAMILY MEDICINE CLINIC | Facility: CLINIC | Age: 54
End: 2019-09-30
Payer: COMMERCIAL

## 2019-09-30 ENCOUNTER — APPOINTMENT (OUTPATIENT)
Dept: RADIOLOGY | Facility: CLINIC | Age: 54
End: 2019-09-30
Payer: COMMERCIAL

## 2019-09-30 VITALS
DIASTOLIC BLOOD PRESSURE: 80 MMHG | OXYGEN SATURATION: 97 % | SYSTOLIC BLOOD PRESSURE: 124 MMHG | TEMPERATURE: 98.9 F | HEIGHT: 63 IN | WEIGHT: 132 LBS | BODY MASS INDEX: 23.39 KG/M2 | HEART RATE: 71 BPM

## 2019-09-30 DIAGNOSIS — M79.674 TOE PAIN, RIGHT: ICD-10-CM

## 2019-09-30 DIAGNOSIS — K21.9 GASTROESOPHAGEAL REFLUX DISEASE WITHOUT ESOPHAGITIS: Primary | ICD-10-CM

## 2019-09-30 DIAGNOSIS — Z12.4 CERVICAL CANCER SCREENING: ICD-10-CM

## 2019-09-30 DIAGNOSIS — E78.2 MIXED HYPERLIPIDEMIA: ICD-10-CM

## 2019-09-30 DIAGNOSIS — IMO0002 UNCONTROLLED TYPE 2 DIABETES MELLITUS, WITHOUT LONG-TERM CURRENT USE OF INSULIN: ICD-10-CM

## 2019-09-30 DIAGNOSIS — Z12.39 BREAST CANCER SCREENING: ICD-10-CM

## 2019-09-30 PROCEDURE — 73630 X-RAY EXAM OF FOOT: CPT

## 2019-09-30 PROCEDURE — 3008F BODY MASS INDEX DOCD: CPT | Performed by: FAMILY MEDICINE

## 2019-09-30 PROCEDURE — 99214 OFFICE O/P EST MOD 30 MIN: CPT | Performed by: FAMILY MEDICINE

## 2019-09-30 RX ORDER — RANITIDINE HCL 75 MG
75 TABLET ORAL 2 TIMES DAILY
Qty: 180 TABLET | Refills: 2 | Status: SHIPPED | OUTPATIENT
Start: 2019-09-30 | End: 2021-11-10 | Stop reason: ALTCHOICE

## 2019-09-30 NOTE — PATIENT INSTRUCTIONS
10% - bad control"> 10% - bad control,Hemoglobin A1c (HbA1c) greater than 10% indicating poor diabetic control,Haemoglobin A1c greater than 10% indicating poor diabetic control">   Diabetes Mellitus Type 2 in Adults, Ambulatory Care   GENERAL INFORMATION:   Diabetes mellitus type 2  is a disease that affects how your body uses glucose (sugar)  Insulin helps move sugar out of the blood so it can be used for energy  Normally, when the blood sugar level increases, the pancreas makes more insulin  Type 2 diabetes develops because either the body cannot make enough insulin, or it cannot use the insulin correctly  After many years, your pancreas may stop making insulin  Common symptoms include the following:   · More hunger or thirst than usual     · Frequent urination     · Weight loss without trying     · Blurred vision  Seek immediate care for the following symptoms:   · Severe abdominal pain, or pain that spreads to your back  You may also be vomiting  · Trouble staying awake or focusing    · Shaking or sweating    · Blurred or double vision    · Breath has a fruity, sweet smell    · Breathing is deep and labored, or rapid and shallow    · Heartbeat is fast and weak  Treatment for diabetes mellitus type 2  includes keeping your blood sugar at a normal level  You must eat the right foods, and exercise regularly  You may also need medicine if you cannot control your blood sugar level with nutrition and exercise  Manage diabetes mellitus type 2:   · Check your blood sugar level  You will be taught how to check a small drop of blood in a glucose monitor  Ask your healthcare provider when and how often to check during the day  Ask your healthcare provider what your blood sugar levels should be when you check them  · Keep track of carbohydrates (sugar and starchy foods)  Your blood sugar level can get too high if you eat too many carbohydrates   Your dietitian will help you plan meals and snacks that have the right amount of carbohydrates  · Eat low-fat foods  Some examples are skinless chicken and low-fat milk  · Eat less sodium (salt)  Some examples of high-sodium foods to limit are soy sauce, potato chips, and soup  Do not add salt to food you cook  Limit your use of table salt  · Eat high-fiber foods  Foods that are a good source of fiber include vegetables, whole grain bread, and beans  · Limit alcohol  Alcohol affects your blood sugar level and can make it harder to manage your diabetes  Women should limit alcohol to 1 drink a day  Men should limit alcohol to 2 drinks a day  A drink of alcohol is 12 ounces of beer, 5 ounces of wine, or 1½ ounces of liquor  · Get regular exercise  Exercise can help keep your blood sugar level steady, decrease your risk of heart disease, and help you lose weight  Exercise for at least 30 minutes, 5 days a week  Include muscle strengthening activities 2 days each week  Work with your healthcare provider to create an exercise plan  · Check your feet each day  for injuries or open sores  Ask your healthcare provider for activities you can do if you have an open sore  · Quit smoking  If you smoke, it is never too late to quit  Smoking can worsen the problems that may occur with diabetes  Ask your healthcare provider for information about how to stop smoking if you are having trouble quitting  · Ask about your weight:  Ask healthcare providers if you need to lose weight, and how much to lose  Ask them to help you with a weight loss program  Even a 10 to 15 pound weight loss can help you manage your blood sugar level  · Carry medical alert identification  Wear medical alert jewelry or carry a card that says you have diabetes  Ask your healthcare provider where to get these items  · Ask about vaccines  Diabetes puts you at risk of serious illness if you get the flu, pneumonia, or hepatitis   Ask your healthcare provider if you should get a flu, pneumonia, or hepatitis B vaccine, and when to get the vaccine  Follow up with your healthcare provider as directed:  Write down your questions so you remember to ask them during your visits  CARE AGREEMENT:   You have the right to help plan your care  Learn about your health condition and how it may be treated  Discuss treatment options with your caregivers to decide what care you want to receive  You always have the right to refuse treatment  The above information is an  only  It is not intended as medical advice for individual conditions or treatments  Talk to your doctor, nurse or pharmacist before following any medical regimen to see if it is safe and effective for you  © 2014 [address] is for End User's use only and may not be sold, redistributed or otherwise used for commercial purposes  All illustrations and images included in CareNotes® are the copyrighted property of A D A M [employer]  or Chandana Lee

## 2019-09-30 NOTE — ASSESSMENT & PLAN NOTE
GERD symptoms patient is using Tums in taking Zantac at this time if symptoms worsen she will take Prilosec her proton pump inhibitor

## 2019-09-30 NOTE — ASSESSMENT & PLAN NOTE
Lab Results   Component Value Date    HGBA1C 8 6 (H) 05/13/2019    Diabetes with poor control A1c 8 6 at this point she has been checking her blood sugar morning and evening    I would like to repeat the A1c test and follow up closely

## 2019-09-30 NOTE — PROGRESS NOTES
Assessment/Plan:       Problem List Items Addressed This Visit        Digestive    GERD (gastroesophageal reflux disease) - Primary     GERD symptoms patient is using Tums in taking Zantac at this time if symptoms worsen she will take Prilosec her proton pump inhibitor         Relevant Medications    ranitidine (ZANTAC) 75 MG tablet       Endocrine    Uncontrolled type 2 diabetes mellitus, without long-term current use of insulin ([facility])       Lab Results   Component Value Date    HGBA1C 8 6 (H) 05/13/2019    Diabetes with poor control A1c 8 6 at this point she has been checking her blood sugar morning and evening  I would like to repeat the A1c test and follow up closely         Relevant Medications    metFORMIN (GLUCOPHAGE) 500 mg tablet    Other Relevant Orders    CBC and differential    Comprehensive metabolic panel    Hemoglobin A1C    Lipid panel       Other    Mixed hyperlipidemia     Mixed hyperlipidemia check lipid profile for next office visit continue with atorvastatin 10 mg         Toe pain, right     Patient has pain in the 3rd toe of the right foot after she fell in her driveway about 4 weeks ago she did not have it x-rayed or immobilized and she has has continuous pain at this point there was a lot of bruising at that time and today it is  to touch and with mobility  I will order an x-ray at this time to rule out a fracture         Relevant Orders    XR foot 3+ vw right      Other Visit Diagnoses     Cervical cancer screening        Relevant Orders    Conventional pap smear, screening    Breast cancer screening        Relevant Orders    Mammo screening bilateral w cad            Subjective:      Patient ID: Aura Davila is a 49 y o  female      Patient presents for checkup today pain in her right foot on the 3rd toe after she fell a month ago also do review her diabetes she did not have lab work done at this time and will have it done over the next 2 months      The following portions of the patient's history were reviewed and updated as appropriate: allergies, current medications, past family history, past medical history, past social history, past surgical history and problem list     Review of Systems   Constitutional: Negative for chills, fatigue and fever  HENT: Negative for congestion, nosebleeds, rhinorrhea, sinus pressure and sore throat  Eyes: Negative for discharge and redness  Respiratory: Negative for cough and shortness of breath  Cardiovascular: Negative for chest pain, palpitations and leg swelling  Gastrointestinal: Negative for abdominal pain, blood in stool and nausea  Endocrine: Negative for cold intolerance, heat intolerance and polyuria  Genitourinary: Negative for dysuria and frequency  Musculoskeletal: Negative for arthralgias, back pain and myalgias  Right foot pain   Skin: Negative for rash  Neurological: Negative for dizziness, weakness and headaches  Hematological: Negative for adenopathy  Psychiatric/Behavioral: Negative for behavioral problems and sleep disturbance  The patient is not nervous/anxious  Objective:      /80 (BP Location: Left arm, Patient Position: Sitting)   Pulse 71   Temp 98 9 °F (37 2 °C) (Tympanic)   Ht 5' 3" (1 6 m)   Wt 59 9 kg (132 lb)   SpO2 97%   BMI 23 38 kg/m²        Physical Exam   Constitutional: She is oriented to person, place, and time  She appears well-developed and well-nourished  No distress  HENT:   Head: Normocephalic and atraumatic  Right Ear: External ear normal    Left Ear: External ear normal    Nose: Nose normal    Mouth/Throat: Oropharynx is clear and moist  No oropharyngeal exudate  Eyes: Pupils are equal, round, and reactive to light  Conjunctivae and EOM are normal  Right eye exhibits no discharge  Left eye exhibits no discharge  No scleral icterus  Neck: Normal range of motion  No JVD present  No thyromegaly present     Cardiovascular: Normal rate, regular rhythm and normal heart sounds  No murmur heard  Pulmonary/Chest: Effort normal  She has no wheezes  She has no rales  She exhibits no tenderness  Abdominal: Soft  Bowel sounds are normal  She exhibits no distension and no mass  There is no tenderness  Musculoskeletal: Normal range of motion  She exhibits no edema, tenderness or deformity  Pain 3rd toe right foot   Lymphadenopathy:     She has no cervical adenopathy  Neurological: She is alert and oriented to person, place, and time  She has normal reflexes  She displays normal reflexes  No cranial nerve deficit  Coordination normal    Skin: Skin is warm and dry  No rash noted  Psychiatric: She has a normal mood and affect  Her behavior is normal  Judgment and thought content normal    Nursing note and vitals reviewed  Data:    Laboratory Results: I have personally reviewed the pertinent laboratory results/reports   Radiology/Other Diagnostic Testing Results: I have personally reviewed pertinent reports         Lab Results   Component Value Date    WBC 4 87 05/13/2019    HGB 13 8 05/13/2019    HCT 43 3 05/13/2019    MCV 89 05/13/2019     05/13/2019     Lab Results   Component Value Date     05/13/2015    K 4 5 05/13/2019     05/13/2019    CO2 29 05/13/2019    ANIONGAP 5 05/13/2015    BUN 13 05/13/2019    CREATININE 0 74 05/13/2019    GLUCOSE 194 (H) 05/13/2015    GLUF 174 (H) 05/13/2019    CALCIUM 9 3 05/13/2019    AST 13 05/13/2019    ALT 32 05/13/2019    ALKPHOS 101 05/13/2019    PROT 7 8 05/13/2015    BILITOT 0 3 05/13/2015    EGFR 93 05/13/2019     Lab Results   Component Value Date    CHOLESTEROL 203 (H) 05/13/2019    CHOLESTEROL 169 10/21/2017    CHOLESTEROL 188 12/19/2016     Lab Results   Component Value Date    HDL 54 05/13/2019    HDL 51 10/21/2017    HDL 60 12/19/2016     Lab Results   Component Value Date    LDLCALC 107 (H) 05/13/2019    LDLCALC 82 10/21/2017    LDLCALC 100 12/19/2016     Lab Results   Component Value Date TRIG 211 (H) 05/13/2019    TRIG 179 (H) 10/21/2017    TRIG 140 12/19/2016     No results found for: [place], [place]  Lab Results   Component Value Date    OAM2OLHCIUGG 1 070 05/13/2019     Lab Results   Component Value Date    HGBA1C 8 6 (H) 05/13/2019     No results found for: SATINDER Alfonso January, DO

## 2019-09-30 NOTE — ASSESSMENT & PLAN NOTE
Patient has pain in the 3rd toe of the right foot after she fell in her driveway about 4 weeks ago she did not have it x-rayed or immobilized and she has has continuous pain at this point there was a lot of bruising at that time and today it is  to touch and with mobility    I will order an x-ray at this time to rule out a fracture

## 2019-10-03 ENCOUNTER — TELEPHONE (OUTPATIENT)
Dept: FAMILY MEDICINE CLINIC | Facility: CLINIC | Age: 54
End: 2019-10-03

## 2019-10-04 NOTE — TELEPHONE ENCOUNTER
Contact patient and refer her for podiatry evaluation with Dr Yue Kinsey, her x-ray shows a calcium spot that could be a potential minor fracture as opposed to a calcium deposit

## 2020-01-27 ENCOUNTER — TELEPHONE (OUTPATIENT)
Dept: FAMILY MEDICINE CLINIC | Facility: CLINIC | Age: 55
End: 2020-01-27

## 2020-01-27 NOTE — TELEPHONE ENCOUNTER
Called pt to schedule over due apt with Dr Trini Huang, but pt states that her  is not currently working and they do not have insurance pt states that she will call back at a later time to make apt

## 2020-04-17 ENCOUNTER — TELEMEDICINE (OUTPATIENT)
Dept: FAMILY MEDICINE CLINIC | Facility: CLINIC | Age: 55
End: 2020-04-17
Payer: COMMERCIAL

## 2020-04-17 DIAGNOSIS — M19.90 OSTEOARTHRITIS, UNSPECIFIED OSTEOARTHRITIS TYPE, UNSPECIFIED SITE: ICD-10-CM

## 2020-04-17 DIAGNOSIS — K21.9 GASTROESOPHAGEAL REFLUX DISEASE WITHOUT ESOPHAGITIS: ICD-10-CM

## 2020-04-17 DIAGNOSIS — I10 ESSENTIAL HYPERTENSION: ICD-10-CM

## 2020-04-17 DIAGNOSIS — IMO0002 UNCONTROLLED TYPE 2 DIABETES MELLITUS, WITHOUT LONG-TERM CURRENT USE OF INSULIN: Primary | ICD-10-CM

## 2020-04-17 DIAGNOSIS — E78.2 MIXED HYPERLIPIDEMIA: ICD-10-CM

## 2020-04-17 PROCEDURE — 99443 PR PHYS/QHP TELEPHONE EVALUATION 21-30 MIN: CPT | Performed by: FAMILY MEDICINE

## 2020-04-17 RX ORDER — ATORVASTATIN CALCIUM 10 MG/1
10 TABLET, FILM COATED ORAL DAILY
Qty: 30 TABLET | Refills: 5 | Status: SHIPPED | OUTPATIENT
Start: 2020-04-17 | End: 2020-12-01

## 2020-04-17 RX ORDER — ASCORBIC ACID 500 MG
500 TABLET ORAL DAILY
Qty: 60 TABLET | Refills: 5 | Status: SHIPPED | OUTPATIENT
Start: 2020-04-17

## 2020-04-17 RX ORDER — OMEPRAZOLE 20 MG/1
20 CAPSULE, DELAYED RELEASE ORAL
Qty: 30 CAPSULE | Refills: 5 | Status: SHIPPED | OUTPATIENT
Start: 2020-04-17

## 2020-05-01 ENCOUNTER — TELEPHONE (OUTPATIENT)
Dept: FAMILY MEDICINE CLINIC | Facility: CLINIC | Age: 55
End: 2020-05-01

## 2020-09-09 DIAGNOSIS — Z12.31 ENCOUNTER FOR SCREENING MAMMOGRAM FOR BREAST CANCER: Primary | ICD-10-CM

## 2020-12-01 ENCOUNTER — OFFICE VISIT (OUTPATIENT)
Dept: FAMILY MEDICINE CLINIC | Facility: CLINIC | Age: 55
End: 2020-12-01
Payer: COMMERCIAL

## 2020-12-01 ENCOUNTER — TELEPHONE (OUTPATIENT)
Dept: FAMILY MEDICINE CLINIC | Facility: CLINIC | Age: 55
End: 2020-12-01

## 2020-12-01 VITALS
HEART RATE: 79 BPM | DIASTOLIC BLOOD PRESSURE: 78 MMHG | OXYGEN SATURATION: 98 % | BODY MASS INDEX: 21.37 KG/M2 | TEMPERATURE: 97.8 F | HEIGHT: 63 IN | SYSTOLIC BLOOD PRESSURE: 122 MMHG | WEIGHT: 120.6 LBS

## 2020-12-01 DIAGNOSIS — L02.212 ABSCESS OF UPPER BACK EXCLUDING SCAPULAR REGION: ICD-10-CM

## 2020-12-01 DIAGNOSIS — K21.9 GASTROESOPHAGEAL REFLUX DISEASE WITHOUT ESOPHAGITIS: ICD-10-CM

## 2020-12-01 DIAGNOSIS — IMO0002 UNCONTROLLED TYPE 2 DIABETES MELLITUS, WITHOUT LONG-TERM CURRENT USE OF INSULIN: Primary | ICD-10-CM

## 2020-12-01 DIAGNOSIS — E78.2 MIXED HYPERLIPIDEMIA: ICD-10-CM

## 2020-12-01 DIAGNOSIS — I10 ESSENTIAL HYPERTENSION: ICD-10-CM

## 2020-12-01 LAB — SL AMB POCT HEMOGLOBIN AIC: 13.5 (ref ?–6.5)

## 2020-12-01 PROCEDURE — 83036 HEMOGLOBIN GLYCOSYLATED A1C: CPT | Performed by: FAMILY MEDICINE

## 2020-12-01 PROCEDURE — 10060 I&D ABSCESS SIMPLE/SINGLE: CPT | Performed by: FAMILY MEDICINE

## 2020-12-01 PROCEDURE — 3725F SCREEN DEPRESSION PERFORMED: CPT | Performed by: FAMILY MEDICINE

## 2020-12-01 PROCEDURE — 99396 PREV VISIT EST AGE 40-64: CPT | Performed by: FAMILY MEDICINE

## 2020-12-01 PROCEDURE — 3046F HEMOGLOBIN A1C LEVEL >9.0%: CPT | Performed by: FAMILY MEDICINE

## 2020-12-01 RX ORDER — AZITHROMYCIN 250 MG/1
TABLET, FILM COATED ORAL
Qty: 6 TABLET | Refills: 0 | Status: SHIPPED | OUTPATIENT
Start: 2020-12-01 | End: 2020-12-04

## 2020-12-01 RX ORDER — INSULIN DEGLUDEC 200 U/ML
20 INJECTION, SOLUTION SUBCUTANEOUS DAILY
Qty: 3 PEN | Refills: 5 | Status: SHIPPED | OUTPATIENT
Start: 2020-12-01 | End: 2022-01-14

## 2020-12-03 ENCOUNTER — TELEPHONE (OUTPATIENT)
Dept: FAMILY MEDICINE CLINIC | Facility: CLINIC | Age: 55
End: 2020-12-03

## 2020-12-04 ENCOUNTER — OFFICE VISIT (OUTPATIENT)
Dept: FAMILY MEDICINE CLINIC | Facility: CLINIC | Age: 55
End: 2020-12-04
Payer: COMMERCIAL

## 2020-12-04 VITALS
BODY MASS INDEX: 21.26 KG/M2 | SYSTOLIC BLOOD PRESSURE: 118 MMHG | HEIGHT: 63 IN | OXYGEN SATURATION: 97 % | DIASTOLIC BLOOD PRESSURE: 60 MMHG | TEMPERATURE: 97.8 F | WEIGHT: 120 LBS | HEART RATE: 86 BPM

## 2020-12-04 DIAGNOSIS — L02.212 ABSCESS OF UPPER BACK EXCLUDING SCAPULAR REGION: Primary | ICD-10-CM

## 2020-12-04 PROCEDURE — 99024 POSTOP FOLLOW-UP VISIT: CPT | Performed by: FAMILY MEDICINE

## 2020-12-07 ENCOUNTER — TELEPHONE (OUTPATIENT)
Dept: FAMILY MEDICINE CLINIC | Facility: CLINIC | Age: 55
End: 2020-12-07

## 2020-12-07 ENCOUNTER — OFFICE VISIT (OUTPATIENT)
Dept: FAMILY MEDICINE CLINIC | Facility: CLINIC | Age: 55
End: 2020-12-07
Payer: COMMERCIAL

## 2020-12-07 VITALS
SYSTOLIC BLOOD PRESSURE: 126 MMHG | HEIGHT: 63 IN | WEIGHT: 125.4 LBS | TEMPERATURE: 97 F | DIASTOLIC BLOOD PRESSURE: 72 MMHG | OXYGEN SATURATION: 97 % | HEART RATE: 62 BPM | BODY MASS INDEX: 22.22 KG/M2

## 2020-12-07 DIAGNOSIS — M54.6 ACUTE MIDLINE THORACIC BACK PAIN: ICD-10-CM

## 2020-12-07 DIAGNOSIS — L02.212 ABSCESS OF UPPER BACK EXCLUDING SCAPULAR REGION: Primary | ICD-10-CM

## 2020-12-07 DIAGNOSIS — L72.3 SEBACEOUS CYST: ICD-10-CM

## 2020-12-07 PROCEDURE — 99024 POSTOP FOLLOW-UP VISIT: CPT | Performed by: FAMILY MEDICINE

## 2020-12-07 RX ORDER — AZITHROMYCIN 250 MG/1
TABLET, FILM COATED ORAL
Qty: 6 TABLET | Refills: 0 | Status: SHIPPED | OUTPATIENT
Start: 2020-12-07 | End: 2020-12-12

## 2020-12-10 ENCOUNTER — OFFICE VISIT (OUTPATIENT)
Dept: FAMILY MEDICINE CLINIC | Facility: CLINIC | Age: 55
End: 2020-12-10
Payer: COMMERCIAL

## 2020-12-10 VITALS
WEIGHT: 127 LBS | OXYGEN SATURATION: 97 % | HEIGHT: 63 IN | HEART RATE: 65 BPM | TEMPERATURE: 97.9 F | DIASTOLIC BLOOD PRESSURE: 72 MMHG | BODY MASS INDEX: 22.5 KG/M2 | SYSTOLIC BLOOD PRESSURE: 114 MMHG

## 2020-12-10 DIAGNOSIS — I63.9 CEREBROVASCULAR ACCIDENT (CVA), UNSPECIFIED MECHANISM (HCC): ICD-10-CM

## 2020-12-10 DIAGNOSIS — IMO0002 UNCONTROLLED TYPE 2 DIABETES MELLITUS, WITHOUT LONG-TERM CURRENT USE OF INSULIN: Primary | ICD-10-CM

## 2020-12-10 DIAGNOSIS — L02.212 ABSCESS OF UPPER BACK EXCLUDING SCAPULAR REGION: ICD-10-CM

## 2020-12-10 PROCEDURE — 1036F TOBACCO NON-USER: CPT | Performed by: FAMILY MEDICINE

## 2020-12-10 PROCEDURE — 99213 OFFICE O/P EST LOW 20 MIN: CPT | Performed by: FAMILY MEDICINE

## 2020-12-10 PROCEDURE — 3008F BODY MASS INDEX DOCD: CPT | Performed by: FAMILY MEDICINE

## 2020-12-10 PROCEDURE — 3078F DIAST BP <80 MM HG: CPT | Performed by: FAMILY MEDICINE

## 2020-12-10 PROCEDURE — 3074F SYST BP LT 130 MM HG: CPT | Performed by: FAMILY MEDICINE

## 2021-01-11 ENCOUNTER — CONSULT (OUTPATIENT)
Dept: SURGERY | Facility: CLINIC | Age: 56
End: 2021-01-11
Payer: COMMERCIAL

## 2021-01-11 VITALS
BODY MASS INDEX: 22.86 KG/M2 | SYSTOLIC BLOOD PRESSURE: 120 MMHG | TEMPERATURE: 97.9 F | DIASTOLIC BLOOD PRESSURE: 62 MMHG | HEART RATE: 72 BPM | WEIGHT: 129 LBS | HEIGHT: 63 IN

## 2021-01-11 DIAGNOSIS — L72.3 SEBACEOUS CYST: ICD-10-CM

## 2021-01-11 DIAGNOSIS — L02.212 ABSCESS OF UPPER BACK EXCLUDING SCAPULAR REGION: ICD-10-CM

## 2021-01-11 PROCEDURE — 88304 TISSUE EXAM BY PATHOLOGIST: CPT | Performed by: PATHOLOGY

## 2021-01-11 PROCEDURE — 87070 CULTURE OTHR SPECIMN AEROBIC: CPT | Performed by: SPECIALIST

## 2021-01-11 PROCEDURE — 1036F TOBACCO NON-USER: CPT | Performed by: SPECIALIST

## 2021-01-11 PROCEDURE — 11404 EXC TR-EXT B9+MARG 3.1-4 CM: CPT | Performed by: SPECIALIST

## 2021-01-11 PROCEDURE — 99243 OFF/OP CNSLTJ NEW/EST LOW 30: CPT | Performed by: SPECIALIST

## 2021-01-11 PROCEDURE — 12032 INTMD RPR S/A/T/EXT 2.6-7.5: CPT | Performed by: SPECIALIST

## 2021-01-11 PROCEDURE — 87205 SMEAR GRAM STAIN: CPT | Performed by: SPECIALIST

## 2021-01-11 NOTE — PATIENT INSTRUCTIONS
Remove the band aid tomorrow and shower  Wash the sutures with soap and water  Replace the band aid, keep it covered until the drainage stops, usually just a few days  Tylenol for pain    Return in one week to have the sutures out  If anything comes back on the culture that requires an antibiotic, we will call you

## 2021-01-11 NOTE — ASSESSMENT & PLAN NOTE
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

## 2021-01-11 NOTE — PROGRESS NOTES
Assessment/Plan:    No problem-specific Assessment & Plan notes found for this encounter  Diagnoses and all orders for this visit:    Abscess of upper back excluding scapular region  -     Ambulatory referral to General Surgery  -     Cancel: Wound culture and Gram stain  -     Tissue Exam  -     Wound culture and Gram stain    Sebaceous cyst  -     Ambulatory referral to General Surgery          Subjective:      Patient ID: Kate Holland is a 48 y o  female  49 yo WF with hx of DM, one month out from I & D of an infected, recurrent EIC  The following portions of the patient's history were reviewed and updated as appropriate: allergies, current medications, past family history, past medical history, past social history, past surgical history and problem list     Review of Systems   Constitutional: Negative for chills and fever  Skin: Positive for wound (painful and draining wound of upper back)  Psychiatric/Behavioral: The patient is nervous/anxious  Objective:      /62   Pulse 72   Temp 97 9 °F (36 6 °C)   Ht 5' 3" (1 6 m)   Wt 58 5 kg (129 lb)   BMI 22 85 kg/m²          Physical Exam  Constitutional:       Appearance: Normal appearance  HENT:      Head: Normocephalic  Cardiovascular:      Rate and Rhythm: Normal rate  Pulmonary:      Effort: Pulmonary effort is normal    Skin:     General: Skin is warm and dry  Findings: Lesion (draining skin sinus of upper back) present  Neurological:      Mental Status: She is alert     Psychiatric:         Mood and Affect: Mood normal            Procedures   Excision of residual Epidermal inclusion cyst  Sinus 2 mm in nakia, in center of cicatrix 3 cm in length    Betadine prep  1% lidocaine with epinepherine  Strict aseptic technique  Written consent obtained    Cicatrix excised, with residual cyst and adherent subcutaneous tissue to depth of 1 cm  Specimen elipse 3 0 cm x 7 cm  Edges debrided  Culture taken  Closed in layers, 3 cm with 3-0 Chromic for SQ, and 3-0 Nylon for skin  Dry sterile dressing applied  CPT:    82407  [id number]    1/14/21  12:00  Patient called to complain that the wound was hurting  She has used a warm compress, there doesn't appear to be any drainage  Culture result reviewed, a few colonies of Staph epi were noted  No antibiotics are warranted  Offered her F/U with Dr Brook Calderón tomorrow, patient declines, will follow up on Monday 1/18 in [place]

## 2021-01-14 LAB
BACTERIA WND AEROBE CULT: ABNORMAL
GRAM STN SPEC: ABNORMAL

## 2021-01-18 ENCOUNTER — OFFICE VISIT (OUTPATIENT)
Dept: SURGERY | Facility: CLINIC | Age: 56
End: 2021-01-18

## 2021-01-18 VITALS
WEIGHT: 129 LBS | SYSTOLIC BLOOD PRESSURE: 122 MMHG | HEART RATE: 69 BPM | TEMPERATURE: 98.7 F | BODY MASS INDEX: 22.86 KG/M2 | HEIGHT: 63 IN | DIASTOLIC BLOOD PRESSURE: 86 MMHG

## 2021-01-18 DIAGNOSIS — L02.212 ABSCESS OF UPPER BACK EXCLUDING SCAPULAR REGION: Primary | ICD-10-CM

## 2021-01-18 PROCEDURE — 99024 POSTOP FOLLOW-UP VISIT: CPT | Performed by: SPECIALIST

## 2021-01-18 PROCEDURE — 3008F BODY MASS INDEX DOCD: CPT | Performed by: SPECIALIST

## 2021-01-18 NOTE — PATIENT INSTRUCTIONS
Case Report Surgical Pathology Report Case: [id number] Authorizing Provider: Monae Camacho MD Collected: 01/11/2021 1600 Ordering Location: [facility] Received: 01/11/2021 Irving Melo Pathologist: Apple Gleason MD Specimen: Skin, Cyst/Tag/Debridement, Back Final Diagnosis   A  Skin, Cyst/Tag/Debridement, Back, excision:   - Ruptured epidermal (infundibular) cyst with foreign body giant cell reaction to keratinous debris, acute and   chronically inflamed granulation tissue and scar  Electronically signed by Apple Gleason MD on 1/15/2021 at 1:47 PM Additional Information   All reported additional testing was performed with appropriately reactive controls  These tests were developed and their performance characteristics determined by [facility] or appropriate performing facility, though some tests may be performed on tissues which have not been validated for performance characteristics (such as staining performed on alcohol exposed cell blocks and decalcified tissues)  Results should be interpreted with caution and in the context of the patients clinical condition  These tests may not be cleared or approved by the [place]  Food and Drug Administration, though the FDA has determined that such clearance or approval is not necessary  These tests are used for clinical purposes and they should not be regarded as investigational or for research  This laboratory has been approved by [facility], designated as a high-complexity laboratory and is qualified to perform these tests  Interpretation performed at [facility], [address] [place] [address]  Gross Description   A  The specimen is received in formalin, labeled with the patient's name and hospital number, and is designated "skin, back  The specimen consists of a single tan white irregularly-shaped, rubbery friable soft tissue fragment measuring 2 5 x 1 5 x 0 7 cm  Entirely submitted  One cassette     Note: The estimated total formalin fixation time based upon information provided by the submitting clinician and the standard processing schedule is under 72 hours  Bellez     You may shower, no tub baths until the steri-strips fall off  No further dressings    Return if you have any further problems

## 2021-01-18 NOTE — PROGRESS NOTES
Assessment/Plan:    Abscess of upper back excluding scapular region  The patient returns for a wound check and to check the path report and wound culture  She reports continued pain at the site  On exam the wound is healing uneventfully  The nylon sutures were removed and steri-strips applied  The path report is consistent with a ruptured Epidermal Inclusion Cyst, and the culture shows only a few Staph epi  The patient was reassured that things are going well  She will follow up at the first sign of a recurrence  Media Information       Document Information    Clinical Image - Mobile Device      01/18/2021 1:38 PM   Attached To: Office Visit on 1/18/21 with Cassy Nash MD   Source Information    Cassy Nash MD   Gen Surg [place]                    Diagnoses and all orders for this visit:    Abscess of upper back excluding scapular region          Subjective:      Patient ID: Orlando Espinoza is a 48 y o  female  One week follow up excision of residual epidermal inclusion cyst       The following portions of the patient's history were reviewed and updated as appropriate: allergies, current medications, past family history, past medical history, past social history, past surgical history and problem list     Review of Systems   Constitutional: Negative for chills and fever  Skin: Positive for wound  Objective:      /86   Pulse 69   Temp 98 7 °F (37 1 °C)   Ht 5' 3" (1 6 m)   Wt 58 5 kg (129 lb)   BMI 22 85 kg/m²          Physical Exam  Constitutional:       Appearance: Normal appearance  Skin:     General: Skin is warm and dry  Comments: Wound appears to be healing uneventfully   Neurological:      Mental Status: She is alert

## 2021-01-18 NOTE — ASSESSMENT & PLAN NOTE
The patient returns for a wound check and to check the path report and wound culture  She reports continued pain at the site  On exam the wound is healing uneventfully  The nylon sutures were removed and steri-strips applied  The path report is consistent with a ruptured Epidermal Inclusion Cyst, and the culture shows only a few Staph epi  The patient was reassured that things are going well  She will follow up at the first sign of a recurrence  Media Information       Document Information    Clinical Image - Mobile Device      01/18/2021 1:38 PM   Attached To:    Office Visit on 1/18/21 with Mark Morales MD   Source Information    MD Stephen Barrios

## 2021-03-01 PROBLEM — E11.65 TYPE 2 DIABETES MELLITUS WITH HYPERGLYCEMIA (HCC): Status: ACTIVE | Noted: 2021-03-01

## 2021-03-13 ENCOUNTER — APPOINTMENT (OUTPATIENT)
Dept: LAB | Facility: CLINIC | Age: 56
End: 2021-03-13
Payer: COMMERCIAL

## 2021-03-13 DIAGNOSIS — K21.9 GASTROESOPHAGEAL REFLUX DISEASE WITHOUT ESOPHAGITIS: ICD-10-CM

## 2021-03-13 DIAGNOSIS — I10 ESSENTIAL HYPERTENSION: ICD-10-CM

## 2021-03-13 DIAGNOSIS — IMO0002 UNCONTROLLED TYPE 2 DIABETES MELLITUS, WITHOUT LONG-TERM CURRENT USE OF INSULIN: ICD-10-CM

## 2021-03-13 LAB
ALBUMIN SERPL BCP-MCNC: 3.7 G/DL (ref 3.5–5)
ALP SERPL-CCNC: 118 U/L (ref 46–116)
ALT SERPL W P-5'-P-CCNC: 24 U/L (ref 12–78)
ANION GAP SERPL CALCULATED.3IONS-SCNC: 4 MMOL/L (ref 4–13)
AST SERPL W P-5'-P-CCNC: 9 U/L (ref 5–45)
BASOPHILS # BLD AUTO: 0.02 THOUSANDS/ΜL (ref 0–0.1)
BASOPHILS NFR BLD AUTO: 0 % (ref 0–1)
BILIRUB SERPL-MCNC: 0.58 MG/DL (ref 0.2–1)
BUN SERPL-MCNC: 16 MG/DL (ref 5–25)
CALCIUM SERPL-MCNC: 8.9 MG/DL (ref 8.3–10.1)
CHLORIDE SERPL-SCNC: 108 MMOL/L (ref 100–108)
CHOLEST SERPL-MCNC: 217 MG/DL (ref 50–200)
CO2 SERPL-SCNC: 29 MMOL/L (ref 21–32)
CREAT SERPL-MCNC: 0.66 MG/DL (ref 0.6–1.3)
CREAT UR-MCNC: 101 MG/DL
EOSINOPHIL # BLD AUTO: 0.13 THOUSAND/ΜL (ref 0–0.61)
EOSINOPHIL NFR BLD AUTO: 2 % (ref 0–6)
ERYTHROCYTE [DISTWIDTH] IN BLOOD BY AUTOMATED COUNT: 11.5 % (ref 11.6–15.1)
EST. AVERAGE GLUCOSE BLD GHB EST-MCNC: 246 MG/DL
GFR SERPL CREATININE-BSD FRML MDRD: 100 ML/MIN/1.73SQ M
GLUCOSE P FAST SERPL-MCNC: 180 MG/DL (ref 65–99)
HBA1C MFR BLD: 10.2 %
HCT VFR BLD AUTO: 43.3 % (ref 34.8–46.1)
HDLC SERPL-MCNC: 58 MG/DL
HGB BLD-MCNC: 14.4 G/DL (ref 11.5–15.4)
IMM GRANULOCYTES # BLD AUTO: 0.01 THOUSAND/UL (ref 0–0.2)
IMM GRANULOCYTES NFR BLD AUTO: 0 % (ref 0–2)
LDLC SERPL CALC-MCNC: 131 MG/DL (ref 0–100)
LYMPHOCYTES # BLD AUTO: 3.25 THOUSANDS/ΜL (ref 0.6–4.47)
LYMPHOCYTES NFR BLD AUTO: 48 % (ref 14–44)
MCH RBC QN AUTO: 28.7 PG (ref 26.8–34.3)
MCHC RBC AUTO-ENTMCNC: 33.3 G/DL (ref 31.4–37.4)
MCV RBC AUTO: 86 FL (ref 82–98)
MICROALBUMIN UR-MCNC: 24.8 MG/L (ref 0–20)
MICROALBUMIN/CREAT 24H UR: 25 MG/G CREATININE (ref 0–30)
MONOCYTES # BLD AUTO: 0.39 THOUSAND/ΜL (ref 0.17–1.22)
MONOCYTES NFR BLD AUTO: 6 % (ref 4–12)
NEUTROPHILS # BLD AUTO: 2.91 THOUSANDS/ΜL (ref 1.85–7.62)
NEUTS SEG NFR BLD AUTO: 44 % (ref 43–75)
NONHDLC SERPL-MCNC: 159 MG/DL
NRBC BLD AUTO-RTO: 0 /100 WBCS
PLATELET # BLD AUTO: 186 THOUSANDS/UL (ref 149–390)
PMV BLD AUTO: 10.3 FL (ref 8.9–12.7)
POTASSIUM SERPL-SCNC: 4 MMOL/L (ref 3.5–5.3)
PROT SERPL-MCNC: 7.2 G/DL (ref 6.4–8.2)
RBC # BLD AUTO: 5.01 MILLION/UL (ref 3.81–5.12)
SODIUM SERPL-SCNC: 141 MMOL/L (ref 136–145)
TRIGL SERPL-MCNC: 139 MG/DL
TSH SERPL DL<=0.05 MIU/L-ACNC: 2.05 UIU/ML (ref 0.36–3.74)
WBC # BLD AUTO: 6.71 THOUSAND/UL (ref 4.31–10.16)

## 2021-03-13 PROCEDURE — 36415 COLL VENOUS BLD VENIPUNCTURE: CPT

## 2021-03-13 PROCEDURE — 80053 COMPREHEN METABOLIC PANEL: CPT

## 2021-03-13 PROCEDURE — 3046F HEMOGLOBIN A1C LEVEL >9.0%: CPT | Performed by: FAMILY MEDICINE

## 2021-03-13 PROCEDURE — 83036 HEMOGLOBIN GLYCOSYLATED A1C: CPT

## 2021-03-13 PROCEDURE — 80061 LIPID PANEL: CPT

## 2021-03-13 PROCEDURE — 84443 ASSAY THYROID STIM HORMONE: CPT

## 2021-03-13 PROCEDURE — 82043 UR ALBUMIN QUANTITATIVE: CPT | Performed by: FAMILY MEDICINE

## 2021-03-13 PROCEDURE — 87389 HIV-1 AG W/HIV-1&-2 AB AG IA: CPT

## 2021-03-13 PROCEDURE — 3061F NEG MICROALBUMINURIA REV: CPT | Performed by: FAMILY MEDICINE

## 2021-03-13 PROCEDURE — 85025 COMPLETE CBC W/AUTO DIFF WBC: CPT

## 2021-03-13 PROCEDURE — 82570 ASSAY OF URINE CREATININE: CPT | Performed by: FAMILY MEDICINE

## 2021-03-15 LAB — HIV 1+2 AB+HIV1 P24 AG SERPL QL IA: NORMAL

## 2021-03-22 ENCOUNTER — OFFICE VISIT (OUTPATIENT)
Dept: FAMILY MEDICINE CLINIC | Facility: CLINIC | Age: 56
End: 2021-03-22
Payer: COMMERCIAL

## 2021-03-22 VITALS
BODY MASS INDEX: 23.21 KG/M2 | TEMPERATURE: 99.4 F | OXYGEN SATURATION: 92 % | DIASTOLIC BLOOD PRESSURE: 78 MMHG | WEIGHT: 131 LBS | SYSTOLIC BLOOD PRESSURE: 128 MMHG | HEART RATE: 77 BPM | HEIGHT: 63 IN

## 2021-03-22 DIAGNOSIS — E11.65 TYPE 2 DIABETES MELLITUS WITH HYPERGLYCEMIA, WITH LONG-TERM CURRENT USE OF INSULIN (HCC): ICD-10-CM

## 2021-03-22 DIAGNOSIS — E78.2 MIXED HYPERLIPIDEMIA: ICD-10-CM

## 2021-03-22 DIAGNOSIS — Z79.4 TYPE 2 DIABETES MELLITUS WITH HYPERGLYCEMIA, WITH LONG-TERM CURRENT USE OF INSULIN (HCC): ICD-10-CM

## 2021-03-22 DIAGNOSIS — IMO0002 UNCONTROLLED TYPE 2 DIABETES MELLITUS, WITHOUT LONG-TERM CURRENT USE OF INSULIN: Primary | ICD-10-CM

## 2021-03-22 DIAGNOSIS — I10 ESSENTIAL HYPERTENSION: ICD-10-CM

## 2021-03-22 PROCEDURE — 1036F TOBACCO NON-USER: CPT | Performed by: FAMILY MEDICINE

## 2021-03-22 PROCEDURE — 3078F DIAST BP <80 MM HG: CPT | Performed by: FAMILY MEDICINE

## 2021-03-22 PROCEDURE — 3074F SYST BP LT 130 MM HG: CPT | Performed by: FAMILY MEDICINE

## 2021-03-22 PROCEDURE — 99214 OFFICE O/P EST MOD 30 MIN: CPT | Performed by: FAMILY MEDICINE

## 2021-03-22 PROCEDURE — 3008F BODY MASS INDEX DOCD: CPT | Performed by: FAMILY MEDICINE

## 2021-03-22 RX ORDER — FLASH GLUCOSE SENSOR
1 KIT MISCELLANEOUS
Qty: 1 EACH | Refills: 26 | Status: SHIPPED | OUTPATIENT
Start: 2021-03-22 | End: 2022-05-09

## 2021-03-22 RX ORDER — FLASH GLUCOSE SCANNING READER
1 EACH MISCELLANEOUS 4 TIMES DAILY
Qty: 1 DEVICE | Refills: 5 | Status: SHIPPED | OUTPATIENT
Start: 2021-03-22 | End: 2022-05-10

## 2021-03-22 NOTE — ASSESSMENT & PLAN NOTE
Mixed hyperlipidemia currently stable but in light of diabetes will need to watch closely diet and add statin medication if worsening lipid profile

## 2021-03-22 NOTE — ASSESSMENT & PLAN NOTE
Lab Results   Component Value Date    HGBA1C 10 2 (H) 03/13/2021   Diabetes with A1c at 10 2 patient will need to watch more closely with her overall diet increase Tresiba daily and re-evaluate A1c in 3 months this needs to be down closer to 7 or less ideally under 8    Patient will increase her Sobeida Poles by 5 units now up to 25 units as she will become more active working outdoors through the spring and re-evaluate A1c and CMP in 3 months

## 2021-03-22 NOTE — PROGRESS NOTES
Assessment/Plan:       Problem List Items Addressed This Visit        Endocrine    Uncontrolled type 2 diabetes mellitus, without long-term current use of insulin ([facility] [address] ) - Primary       Lab Results   Component Value Date    HGBA1C 10 2 (H) 03/13/2021   Diabetes with A1c at 10 2 patient will need to watch more closely with her overall diet increase Tresiba daily and re-evaluate A1c in 3 months this needs to be down closer to 7 or less ideally under 8    Patient will increase her Andreea Carry by 5 units now up to 25 units as she will become more active working outdoors through the spring and re-evaluate A1c and CMP in 3 months         Relevant Medications    metFORMIN (GLUCOPHAGE) 500 mg tablet    Continuous Blood Gluc Sensor (FreeStyle [place] 14 Day Sensor) MISC    Continuous Blood Gluc  (FreeStyle [place] 14 Day Sweet) MARGOTH    Other Relevant Orders    Ambulatory referral to Ophthalmology    HEMOGLOBIN A1C W/ EAG ESTIMATION    Comprehensive metabolic panel    Hemoglobin A1C    Lipid panel    TSH, 3rd generation with Free T4 reflex    Type 2 diabetes mellitus with hyperglycemia (HCC)       Lab Results   Component Value Date    HGBA1C 10 2 (H) 03/13/2021    work on reduction of carbohydrates in diet increasing insulin dosage as patient is not overweight         Relevant Medications    metFORMIN (GLUCOPHAGE) 500 mg tablet    Continuous Blood Gluc Sensor (FreeStyle [place] 14 Day Sensor) MISC    Continuous Blood Gluc  (FreeStyle [place] 14 Day Sweet) MARGOTH    Other Relevant Orders    Comprehensive metabolic panel    Hemoglobin A1C    Lipid panel    TSH, 3rd generation with Free T4 reflex       Cardiovascular and Mediastinum    Hypertension      Stable overall continue current medication no change         Relevant Medications    Continuous Blood Gluc Sensor (FreeStyle [place] 14 Day Sensor) MISC    Continuous Blood Gluc  (FreeStyle [place] 14 Day Sweet) MARGOTH    Other Relevant Orders    Comprehensive metabolic panel    Hemoglobin A1C    Lipid panel    TSH, 3rd generation with Free T4 reflex       Other    Mixed hyperlipidemia      Mixed hyperlipidemia currently stable but in light of diabetes will need to watch closely diet and add statin medication if worsening lipid profile         Relevant Medications    Continuous Blood Gluc Sensor (FreeStyle [place] 14 Day Sensor) MISC    Continuous Blood Gluc  (FreeStyle [place] 14 Day [place]) MARGOTH    Other Relevant Orders    Comprehensive metabolic panel    Hemoglobin A1C    Lipid panel    TSH, 3rd generation with Free T4 reflex            Subjective:      Patient ID: Jerrell Robin is a 48 y o  female  Patient presents today for diabetic checkup general evaluation      The following portions of the patient's history were reviewed and updated as appropriate: allergies, current medications, past family history, past medical history, past social history, past surgical history and problem list     Review of Systems   Constitutional: Negative for chills, fatigue and fever  HENT: Negative for congestion, nosebleeds, rhinorrhea, sinus pressure and sore throat  Eyes: Negative for discharge and redness  Respiratory: Negative for cough and shortness of breath  Cardiovascular: Negative for chest pain, palpitations and leg swelling  Gastrointestinal: Negative for abdominal pain, blood in stool and nausea  Endocrine: Negative for cold intolerance, heat intolerance and polyuria  Genitourinary: Negative for dysuria and frequency  Musculoskeletal: Negative for arthralgias, back pain and myalgias  Skin: Negative for rash  Neurological: Negative for dizziness, weakness and headaches  Hematological: Negative for adenopathy  Psychiatric/Behavioral: Negative for behavioral problems and sleep disturbance  The patient is not nervous/anxious            Objective:      /78   Pulse 77   Temp 99 4 °F (37 4 °C)   Ht 5' 3" (1 6 m)   Wt 59 4 kg (131 lb)   SpO2 92% BMI 23 21 kg/m²        Physical Exam  Vitals signs and nursing note reviewed  Constitutional:       General: She is not in acute distress  Appearance: Normal appearance  She is well-developed and normal weight  HENT:      Head: Normocephalic and atraumatic  Right Ear: Tympanic membrane and external ear normal       Left Ear: External ear normal       Nose: Nose normal       Mouth/Throat:      Mouth: Mucous membranes are moist       Pharynx: Oropharynx is clear  No oropharyngeal exudate  Eyes:      General: No scleral icterus  Right eye: No discharge  Left eye: No discharge  Extraocular Movements: Extraocular movements intact  Conjunctiva/sclera: Conjunctivae normal       Pupils: Pupils are equal, round, and reactive to light  Neck:      Musculoskeletal: Normal range of motion  Thyroid: No thyromegaly  Vascular: No JVD  Cardiovascular:      Rate and Rhythm: Normal rate and regular rhythm  Heart sounds: Normal heart sounds  No murmur  Pulmonary:      Effort: Pulmonary effort is normal       Breath sounds: No wheezing or rales  Chest:      Chest wall: No tenderness  Abdominal:      General: Abdomen is flat  Bowel sounds are normal  There is no distension  Palpations: Abdomen is soft  There is no mass  Tenderness: There is no abdominal tenderness  Musculoskeletal: Normal range of motion  General: No tenderness or deformity  Lymphadenopathy:      Cervical: No cervical adenopathy  Skin:     General: Skin is warm and dry  Findings: No rash  Neurological:      General: No focal deficit present  Mental Status: She is alert and oriented to person, place, and time  Mental status is at baseline  Cranial Nerves: No cranial nerve deficit  Coordination: Coordination normal       Deep Tendon Reflexes: Reflexes are normal and symmetric   Reflexes normal    Psychiatric:         Mood and Affect: Mood normal          Behavior: Behavior normal          Thought Content: Thought content normal          Judgment: Judgment normal           Data:    Laboratory Results: I have personally reviewed the pertinent laboratory results/reports   Radiology/Other Diagnostic Testing Results: I have personally reviewed pertinent reports         Lab Results   Component Value Date    WBC 6 71 03/13/2021    HGB 14 4 03/13/2021    HCT 43 3 03/13/2021    MCV 86 03/13/2021     03/13/2021     Lab Results   Component Value Date     05/13/2015    K 4 0 03/13/2021     03/13/2021    CO2 29 03/13/2021    ANIONGAP 5 05/13/2015    BUN 16 03/13/2021    CREATININE 0 66 03/13/2021    GLUCOSE 194 (H) 05/13/2015    GLUF 180 (H) 03/13/2021    CALCIUM 8 9 03/13/2021    AST 9 03/13/2021    ALT 24 03/13/2021    ALKPHOS 118 (H) 03/13/2021    PROT 7 8 05/13/2015    BILITOT 0 3 05/13/2015    EGFR 100 03/13/2021     Lab Results   Component Value Date    CHOLESTEROL 217 (H) 03/13/2021    CHOLESTEROL 203 (H) 05/13/2019    CHOLESTEROL 169 10/21/2017     Lab Results   Component Value Date    HDL 58 03/13/2021    HDL 54 05/13/2019    HDL 51 10/21/2017     Lab Results   Component Value Date    LDLCALC 131 (H) 03/13/2021    LDLCALC 107 (H) 05/13/2019    LDLCALC 82 10/21/2017     Lab Results   Component Value Date    TRIG 139 03/13/2021    TRIG 211 (H) 05/13/2019    TRIG 179 (H) 10/21/2017     No results found for: [place], [place]  Lab Results   Component Value Date    [id number] 2 050 03/13/2021     Lab Results   Component Value Date    HGBA1C 10 2 (H) 03/13/2021     No results found for: SATINDER Aguayo, DO

## 2021-03-22 NOTE — PATIENT INSTRUCTIONS
Meal Planning with Diabetes Exchanges   AMBULATORY CARE:   Diabetes exchanges  are servings of food that contain similar amounts of carbohydrate, fat, protein, and calories within a food group  The exchanges can be used to develop a healthy meal plan that helps to keep your blood sugar within the recommended levels  A meal plan with the right amount of carbohydrates is especially important  Your blood sugar naturally rises after you eat carbohydrates  Too many carbohydrates in 1 meal or snack can raise your blood sugar level  Carbohydrates are found in starches, fruit, milk, yogurt, and sweets  Call your doctor if:   · You have high blood sugar levels during a certain time of day, or almost all of the time  · You often have low blood sugar levels  · You have questions or concerns about your condition or care  Create a meal plan with exchanges:  A dietitian will work with you to develop a healthy meal plan that is right for you  This meal plan will include the amount of exchanges you can have from each food group throughout the day  Follow your meal plan by keeping track of the amount of exchanges you eat for each meal and snack  Your meal plan will be based on your age, weight, blood sugar levels, medicine, and activity level  Starch food group exchanges:  Each exchange below contains about 15 grams of carbohydrate , 3 grams of protein, 1 gram of fat, and 80 calories  · 1 ounce of white, whole wheat or rye bread (1 slice)    · 1 ounce of bagel (about ¼ of a bagel)    · 1 6-inch flour or corn tortilla or 1 4-inch pancake (about ¼ inch thick)    · ?  cup of cooked pasta or rice    · ¾ cup of dry, ready-to-eat cereal with no sugar added     · ½ cup of cooked cereal, such as oatmeal    · 3 lucas cracker squares or 8 animal crackers    · 6 saltine-type crackers or     · 3 cups of popcorn or ¾ ounce of pretzels     · Starchy vegetables and cooked legumes:      ? ½ cup of corn, green peas, sweet potatoes, or mashed potatoes     ? ¼ of a large baked potato     ? 1 cup of acorn, butternut squash, or pumpkin     ? ½ cup of beans, lentils, or peas (such as chacko, kidney, or black-eyed)    ? ? cup of lima beans    Fruit group exchanges:  Each exchange contains about 15 grams of carbohydrate  and 60 calories  · 1 small (4 ounce) apple, banana orange, or nectarine    · ½ cup of canned or fresh fruit    · ½ cup (4 ounces) of unsweetened fruit juice    · 2 tablespoons of dried fruit    Milk group exchanges:  Each exchange contains about 12 grams of carbohydrate  and 8 grams of protein  The amount of fat and calories in each serving depends on the type of milk (such as whole, low-fat, or fat-free)  · 1 cup fat-free or low-fat milk    · ¾ cup of plain, nonfat yogurt    · 1 cup fat-free, flavored yogurt with artificial (no calorie) sweetener    Non-starchy vegetable group exchanges:  Each exchange contains about 5 grams of carbohydrate , 2 grams of protein, and 25 calories  Examples include beets, broccoli, cabbage, carrots, cauliflower, cucumber, mushrooms, tomatoes, and zucchini  · ½ cup of cooked vegetables or 1 cup of raw vegetables     · ½ cup of vegetable juice    Meat and meat substitute group exchanges:  Each exchange of a lean meat  listed below contains about 7 grams of protein, 0 to 3 grams of fat, and 45 calories  The meat and meat substitutes food group does not contain any carbohydrates  Medium and high-fat meats have more calories  · 1 ounce of chicken or turkey without skin, or 1 ounce of fish (not breaded or fried)     · 1 ounce of lean beef, pork, or lamb     · 1-inch cube or 1 ounce of low-fat cheese     · 2 egg whites or ¼ cup of egg substitute     · ½ cup of tofu    Sweets, desserts, and other carbohydrate group exchanges:   · Sweets and other desserts:  Each exchange has about 15 grams of carbohydrate   ? 1 ounce of christophe food cake or 2-inch square cake (unfrosted)    ? 2 small cookies     ?  ½ cup of sugar-free, fat-free ice cream    ? 1 tablespoon of syrup, jam, jelly, table sugar, or honey    · Combination foods:     ? 1 cup of an entrée, such as lasagna, spaghetti with meatballs, macaroni and cheese, and chili with beans (each serving counts as 2 carbohydrate exchanges )     ? 1 cup of tomato or vegetable beef soup (each serving counts as 1 carbohydrate exchange )    Fat group exchanges:  Each exchange contains 5 grams of fat and 45 calories  · 1 teaspoon of oil (such as canola, olive, or corn oil)     · 6 almonds or cashews, 10 peanuts, or 4 pecan halves     · 2 tablespoons of avocado     · ½ tablespoon of peanut butter     · 1 teaspoon of regular margarine or 2 teaspoons of low-fat margarine     · 1 teaspoon of regular butter or 1 tablespoon of low-fat butter     · 1 teaspoon of regular mayonnaise or 1 tablespoon of low-fat mayonnaise     · 1 tablespoon of regular salad dressing or 2 tablespoons of low-fat salad dressing    Free foods: The foods on this list are called free foods because they have very few calories  Free foods usually do not increase your blood sugar if you limit them  · 1 tablespoon of catsup or taco sauce     · ¼ cup of salsa     · 2 tablespoons of sugar-free syrup or 2 teaspoons of light jam or jelly     · 1 tablespoon of fat-free salad dressing     · 4 tablespoons of fat-free margarine or fat-free mayonnaise     · Sugar-free drinks: diet soda, sugar-free drink mixes, or mineral water     · Low-sodium bouillon or fat-free broth     · Mustard     · Seasonings such as spices, herbs, and garlic     · Sugar-free gelatin without added fruit    Other healthy nutrition guidelines:   · Limit drinks with sugar substitutes  Your dietitian or healthcare provider will encourage you to drink water  Water helps your kidneys to function properly  Ask how much water you should drink every day  · Eat more fiber    Choose foods that are good sources of fiber, such as fruits, vegetables, and whole grains  Cereals that contain 5 or more grams of fiber per serving are good sources of fiber  Legumes such as garbanzo, chacko beans, kidney beans, and lentils are also good sources  · Limit fat  Ask your dietitian or healthcare provider how much fat you should eat each day  Choose foods low in fat, saturated fat, trans fat, and cholesterol  Examples include turkey or chicken without the skin, fish, lean cuts of meat, and beans  Low-fat dairy foods, such as low-fat or fat-free milk and low-fat yogurt are also good choices  Omega-3 fatty acids are healthy fats that are found in canola oil, soybean oil and fatty fish  Thayer, albacore tuna, and sardines are good sources of omega 3 fatty acids  Eat 2 servings of these types of fish each week  Do not eat fried fish  · Limit sugar  Sugar and sweets must be counted toward the carbohydrate exchanges that you can have within your meal plan  Limit sugar and sweets because they are usually also high in calories and fat  Eat smaller portions of sweets by sharing a dessert or asking for a child-size portion at a restaurant  · Limit sodium  (salt) to about 2,300 mg per day  You may need to eat even less sodium if you have certain medical conditions  Foods high in sodium include soy sauce, potato chips, and soup  · Limit alcohol  Ask your healthcare provider if it is safe for you to drink alcohol  If alcohol is safe for you to have, eat a meal when you drink alcohol  If you drink alcohol on an empty stomach, your blood sugar may drop to a low level  Women 21 years or older and men 72 years or older should limit alcohol to 1 drink a day  Men aged 24 to 59 years should limit alcohol to 2 drinks a day  A drink of alcohol is 5 ounces of wine, 12 ounces of beer, or 1½ ounces of liquor  Other ways to manage your diabetes:   · Control your blood sugar level  Test your blood sugar level regularly and keep a record of the results   Ask your healthcare provider when and how often to test your blood sugar  You may need to check your blood sugar level at least 3 times each day  · Talk to your healthcare provider about your weight  Ask if you need to lose weight, and how much you need to lose  If you are overweight, you may need to make other changes to lose weight  Ask your healthcare provider to help you create a weight loss program      · Get regular physical activity  Physical activity can help decrease your blood sugar level  It can also help to decrease your risk for heart disease and help you lose weight  Adults should have moderate intensity physical activity for at least 150 minutes every week  Spread the amount of activity over at least 3 days a week  Do not skip more than 2 days in a row  Children should get at least 60 minutes of moderate physical activity on most days of the week  Examples of moderate physical activity include brisk walking, running, and swimming  Do not sit for longer than 30 minutes  Work with your healthcare provider to create a plan for physical activity  © Copyright [address] Information is for End User's use only and may not be sold, redistributed or otherwise used for commercial purposes  All illustrations and images included in CareNotes® are the copyrighted property of [employer]  or [address] [place] [address]  The above information is an  only  It is not intended as medical advice for individual conditions or treatments  Talk to your doctor, nurse or pharmacist before following any medical regimen to see if it is safe and effective for you

## 2021-03-22 NOTE — ASSESSMENT & PLAN NOTE
Lab Results   Component Value Date    HGBA1C 10 2 (H) 03/13/2021    work on reduction of carbohydrates in diet increasing insulin dosage as patient is not overweight

## 2021-08-12 ENCOUNTER — RA CDI HCC (OUTPATIENT)
Dept: OTHER | Facility: HOSPITAL | Age: 56
End: 2021-08-12

## 2021-08-12 PROBLEM — Z79.4 CURRENT USE OF INSULIN (HCC): Status: ACTIVE | Noted: 2021-08-12

## 2021-08-12 NOTE — PROGRESS NOTES
[facility]  coding opportunities          Number of diagnosis code(s) already on the problem list added to FYI fla               Number of suggestions used: 0      Number of suggestions NOT actually used: 2     Patients insurance company: [employer] (Medicare Advantage and Commercial)     Visit status: Patient arrived for their scheduled appointment        [facility]  coding opportunities          Number of diagnosis code(s) already on the problem list added to [facility] Francis fla                     Patients insurance company: [employer] [address] [place] [address] (Medicare Advantage and Commercial)           Found on active problem list -   1) I63 9 Stroke ([facility] )        ----From the correct coding standpoint, using the below code for personal history of stroke with no     residual effects would be more appropriate:   Z86 73: Personal history of transient ischemic attack, and cerebral infarction without residual deficits    2) Z79 4 - use of insulin - assess using MEAT for  billing

## 2021-08-18 ENCOUNTER — TELEPHONE (OUTPATIENT)
Dept: FAMILY MEDICINE CLINIC | Facility: CLINIC | Age: 56
End: 2021-08-18

## 2021-08-19 ENCOUNTER — OFFICE VISIT (OUTPATIENT)
Dept: FAMILY MEDICINE CLINIC | Facility: CLINIC | Age: 56
End: 2021-08-19
Payer: COMMERCIAL

## 2021-08-19 VITALS
TEMPERATURE: 97.8 F | HEART RATE: 58 BPM | DIASTOLIC BLOOD PRESSURE: 80 MMHG | SYSTOLIC BLOOD PRESSURE: 130 MMHG | WEIGHT: 132.8 LBS | OXYGEN SATURATION: 98 % | BODY MASS INDEX: 23.53 KG/M2 | HEIGHT: 63 IN

## 2021-08-19 DIAGNOSIS — I10 ESSENTIAL HYPERTENSION: ICD-10-CM

## 2021-08-19 DIAGNOSIS — IMO0002 UNCONTROLLED TYPE 2 DIABETES MELLITUS, WITHOUT LONG-TERM CURRENT USE OF INSULIN: Primary | ICD-10-CM

## 2021-08-19 DIAGNOSIS — S91.112D LACERATION OF LEFT GREAT TOE WITHOUT FOREIGN BODY PRESENT OR DAMAGE TO NAIL, SUBSEQUENT ENCOUNTER: ICD-10-CM

## 2021-08-19 PROBLEM — S91.112A LACERATION OF LEFT GREAT TOE WITHOUT FOREIGN BODY PRESENT OR DAMAGE TO NAIL: Status: ACTIVE | Noted: 2021-08-19

## 2021-08-19 LAB — SL AMB POCT HEMOGLOBIN AIC: 11.3 (ref ?–6.5)

## 2021-08-19 PROCEDURE — 3075F SYST BP GE 130 - 139MM HG: CPT | Performed by: NURSE PRACTITIONER

## 2021-08-19 PROCEDURE — 1036F TOBACCO NON-USER: CPT | Performed by: NURSE PRACTITIONER

## 2021-08-19 PROCEDURE — 3725F SCREEN DEPRESSION PERFORMED: CPT | Performed by: NURSE PRACTITIONER

## 2021-08-19 PROCEDURE — 99214 OFFICE O/P EST MOD 30 MIN: CPT | Performed by: NURSE PRACTITIONER

## 2021-08-19 PROCEDURE — 83036 HEMOGLOBIN GLYCOSYLATED A1C: CPT | Performed by: NURSE PRACTITIONER

## 2021-08-19 PROCEDURE — 3079F DIAST BP 80-89 MM HG: CPT | Performed by: NURSE PRACTITIONER

## 2021-08-19 PROCEDURE — 3008F BODY MASS INDEX DOCD: CPT | Performed by: NURSE PRACTITIONER

## 2021-08-19 NOTE — PROGRESS NOTES
OFFICE VISIT  Araceli Castrejon 55 y o  female MRN: [id number]          Assessment / Plan:  Problem List Items Addressed This Visit        Endocrine    Uncontrolled type 2 diabetes mellitus, without long-term current use of insulin ([facility] ) - Primary       Lab Results   Component Value Date    HGBA1C 11 3 (A) 08/19/2021   uncontrolled  Discussed medication management and insulin, today we increased metformin to 1000mg twice laws, although I did discuss with her she will need additional medication or insulin adjustment  She was encourage to monitor her sugar more closely and for close followup          Relevant Medications    metFORMIN (GLUCOPHAGE) 1000 MG tablet    Other Relevant Orders    POCT hemoglobin A1c (Completed)       Cardiovascular and Mediastinum    Hypertension     acceptable range today , no change in current treatment plan            Other    Laceration of left great toe without foreign body present or damage to nail     We discussed signs of infection and to monitor toe/wound closely due to DM  Xray prev obtained to rule out OM                 Reason For Visit / Chief Complaint  Chief Complaint   Patient presents with    Hypertension     follow up    Foot Pain     stitches on big toe    Back Pain     feels a lump on back by neck        HPI:  Araceli Castrejon is a 55 y o  female who presents today left foot injury  She reports she was in the urgent care for foot injury, had dermabond placed on first great toe  She reports cutting with a   She has known diabetes  She had xray completed at that time   type 2dm, uncontrolled, has not completed pending labs, aic obtained in office    Historical Information   Past Medical History:   Diagnosis Date    Arthritis     Colonic polyp     resolved: 08/25/2017    Diabetes mellitus ([facility] )     Dry eye syndrome of bilateral lacrimal glands     last assessed: 05/15/2015    GERD (gastroesophageal reflux disease)     History of screening mammography     last assessed: 2016    Hypertension     Lyme disease     Osteoarthritis     last assessed: 2016    RA (rheumatoid arthritis) (HCC)     Rheumatoid arteritis (HCC)     Rheumatoid arthritis (HCC)     osteoarthritis    Stroke ([facility] )     Symptomatic hypotension     last assessed: 2017    Ventral hernia with obstruction and without gangrene     last assessed: 2017    Vertigo      Past Surgical History:   Procedure Laterality Date    BACK SURGERY      CYST REMOVAL      EGD AND COLONOSCOPY N/A 2/3/2017    Procedure: COLONOSCOPY; POSSIBLE POLYPECTOMY; POSSIBLE BIOPSY AND EGD ;  Surgeon: Karlos Bronson MD;  Location: [facility];   Service:     HERNIA REPAIR      LIPOMA RESECTION      OTHER SURGICAL HISTORY      excision of sebaceous cst to back    TUBAL LIGATION      VENTRAL HERNIA REPAIR N/A 3/17/2017    Procedure: LAPAROSCOPIC VENTRAL HERNIA REPAIR WITH MESH ;  Surgeon: Karlos Bronson MD;  Location: [facility];  Service:      Social History   Social History     Substance and Sexual Activity   Alcohol Use Yes    Comment: Stopped drinking alcohol      Social History     Substance and Sexual Activity   Drug Use No     Social History     Tobacco Use   Smoking Status Former Smoker    Packs/day: 0 25    Years: 2 00    Pack years: 0 50    Types: Cigarettes    Start date:     Quit date: 200    Years since quittin 6   Smokeless Tobacco Never Used     Family History   Problem Relation Age of Onset    COPD Mother         COPD/Emphysema    Diabetes Mother     Heart disease Father     Stroke Father     COPD Father         COPD/Emphysema    Heart attack Father     Diabetes Paternal Grandmother     Breast cancer Paternal Aunt     Depression Daughter        Meds/Allergies   Allergies   Allergen Reactions    Penicillins        Meds:    Current Outpatient Medications:     ascorbic acid (VITAMIN C) 500 mg tablet, Take 1 tablet (500 mg total) by mouth daily, Disp: 60 tablet, Rfl: 5    aspirin (ECOTRIN LOW STRENGTH) 81 mg EC tablet, Take 81 mg by mouth daily, Disp: , Rfl:     calcium carbonate (TUMS) 500 mg chewable tablet, Chew 1 tablet daily, Disp: , Rfl:     Continuous Blood Gluc  (FreeStyle Sydney 14 Day Ferguson) MARGOTH, Use 1 actuation 4 (four) times a day, Disp: 1 Device, Rfl: 5    Continuous Blood Gluc Sensor (FreeStyle [place] 14 Day Sensor) MISC, Use 1 actuation every 14 (fourteen) days, Disp: 1 each, Rfl: 26    cyanocobalamin (VITAMIN B-12) 500 MCG tablet, Take 500 mcg by mouth daily, Disp: , Rfl:     dimenhyDRINATE (DRAMAMINE) 50 mg tablet, Take 50 mg by mouth 2 (two) times a day  , Disp: , Rfl:     docusate sodium (COLACE) 100 mg capsule, Take 100 mg by mouth 2 (two) times a day as needed  , Disp: , Rfl:     insulin degludec (Tresiba FlexTouch) 200 units/mL CONCENTRATED U-200 injection pen, Inject 20 Units under the skin daily, Disp: 3 pen, Rfl: 5    Insulin Pen Needle 32G X 4 MM MISC, Use daily, Disp: 30 each, Rfl: 11    methylcellulose (FIBER THERAPY) 500 mg tablet, Take 1,000 mg by mouth daily, Disp: , Rfl:     omeprazole (PriLOSEC) 20 mg delayed release capsule, Take 1 capsule (20 mg total) by mouth daily before breakfast, Disp: 30 capsule, Rfl: 5    polyethylene glycol (MIRALAX) 17 g packet, Take 17 g by mouth daily as needed (Constipation), Disp: 14 each, Rfl: 0    ranitidine (ZANTAC) 75 MG tablet, Take 1 tablet (75 mg total) by mouth 2 (two) times a day, Disp: 180 tablet, Rfl: 2    metFORMIN (GLUCOPHAGE) 1000 MG tablet, Take 1 tablet (1,000 mg total) by mouth 2 (two) times a day with meals, Disp: 60 tablet, Rfl: 1      REVIEW OF SYSTEMS  Review of Systems   Constitutional: Negative for activity change, chills, fatigue and fever  HENT: Negative for congestion, ear discharge, ear pain, sinus pressure, sinus pain, sore throat, tinnitus and trouble swallowing  Eyes: Negative for photophobia, pain, discharge, itching and visual disturbance     Respiratory: Negative for cough, chest tightness, shortness of breath and wheezing  Cardiovascular: Negative for chest pain and leg swelling  Gastrointestinal: Negative for abdominal distention, abdominal pain, constipation, diarrhea, nausea and vomiting  Endocrine: Negative for polydipsia, polyphagia and polyuria  Genitourinary: Negative for dysuria and frequency  Musculoskeletal: Negative for arthralgias, myalgias, neck pain and neck stiffness  Skin: Positive for wound  Negative for color change  Neurological: Negative for dizziness, syncope, weakness, numbness and headaches  Hematological: Does not bruise/bleed easily  Psychiatric/Behavioral: Negative for behavioral problems, confusion, self-injury, sleep disturbance and suicidal ideas  The patient is not nervous/anxious  Current Vitals:   Blood Pressure: 130/80 (08/19/21 1238)  Pulse: 58 (08/19/21 1238)  Temperature: 97 8 °F (36 6 °C) (08/19/21 1238)  Height: 5' 3" (160 cm) (08/19/21 1238)  Weight - Scale: 60 2 kg (132 lb 12 8 oz) (08/19/21 1238)  SpO2: 98 % (08/19/21 1238)  [unfilled]    PHYSICAL EXAMS:  Physical Exam  Constitutional:       Appearance: Normal appearance  She is well-developed  HENT:      Head: Normocephalic  Right Ear: Tympanic membrane, ear canal and external ear normal       Left Ear: Tympanic membrane, ear canal and external ear normal       Nose: Nose normal  No congestion or rhinorrhea  Mouth/Throat:      Mouth: Mucous membranes are moist       Pharynx: No oropharyngeal exudate or posterior oropharyngeal erythema  Eyes:      Extraocular Movements: Extraocular movements intact  Conjunctiva/sclera: Conjunctivae normal       Pupils: Pupils are equal, round, and reactive to light  Cardiovascular:      Rate and Rhythm: Normal rate and regular rhythm  Pulses: Normal pulses  Heart sounds: Normal heart sounds     Pulmonary:      Effort: Pulmonary effort is normal       Breath sounds: Normal breath sounds  No wheezing or rhonchi  Abdominal:      General: Bowel sounds are normal  There is no distension  Palpations: Abdomen is soft  Tenderness: There is no abdominal tenderness  Musculoskeletal:         General: No swelling, tenderness, deformity or signs of injury  Normal range of motion  Cervical back: Normal range of motion and neck supple  Right lower leg: No edema  Left lower leg: No edema  Skin:     General: Skin is warm and dry  Findings: No bruising, erythema, lesion or rash  Comments: Left foot great toe, dermabond intact, healing bruising, full rom    Neurological:      General: No focal deficit present  Mental Status: She is alert and oriented to person, place, and time  Psychiatric:         Mood and Affect: Mood normal          Behavior: Behavior normal          Thought Content: Thought content normal          Judgment: Judgment normal              Lab, imaging and other studies: I have personally reviewed pertinent reports  Iwona Day

## 2021-08-20 ENCOUNTER — APPOINTMENT (OUTPATIENT)
Dept: LAB | Facility: CLINIC | Age: 56
End: 2021-08-20
Payer: COMMERCIAL

## 2021-08-20 DIAGNOSIS — I10 ESSENTIAL HYPERTENSION: ICD-10-CM

## 2021-08-20 DIAGNOSIS — E78.2 MIXED HYPERLIPIDEMIA: ICD-10-CM

## 2021-08-20 DIAGNOSIS — Z79.4 TYPE 2 DIABETES MELLITUS WITH HYPERGLYCEMIA, WITH LONG-TERM CURRENT USE OF INSULIN (HCC): ICD-10-CM

## 2021-08-20 DIAGNOSIS — IMO0002 UNCONTROLLED TYPE 2 DIABETES MELLITUS, WITHOUT LONG-TERM CURRENT USE OF INSULIN: ICD-10-CM

## 2021-08-20 DIAGNOSIS — E11.65 TYPE 2 DIABETES MELLITUS WITH HYPERGLYCEMIA, WITH LONG-TERM CURRENT USE OF INSULIN (HCC): ICD-10-CM

## 2021-08-20 LAB
ALBUMIN SERPL BCP-MCNC: 3.6 G/DL (ref 3.5–5)
ALP SERPL-CCNC: 112 U/L (ref 46–116)
ALT SERPL W P-5'-P-CCNC: 31 U/L (ref 12–78)
ANION GAP SERPL CALCULATED.3IONS-SCNC: 2 MMOL/L (ref 4–13)
AST SERPL W P-5'-P-CCNC: 13 U/L (ref 5–45)
BILIRUB SERPL-MCNC: 0.55 MG/DL (ref 0.2–1)
BUN SERPL-MCNC: 17 MG/DL (ref 5–25)
CALCIUM SERPL-MCNC: 9.1 MG/DL (ref 8.3–10.1)
CHLORIDE SERPL-SCNC: 106 MMOL/L (ref 100–108)
CHOLEST SERPL-MCNC: 241 MG/DL (ref 50–200)
CO2 SERPL-SCNC: 28 MMOL/L (ref 21–32)
CREAT SERPL-MCNC: 0.69 MG/DL (ref 0.6–1.3)
EST. AVERAGE GLUCOSE BLD GHB EST-MCNC: 272 MG/DL
GFR SERPL CREATININE-BSD FRML MDRD: 98 ML/MIN/1.73SQ M
GLUCOSE P FAST SERPL-MCNC: 233 MG/DL (ref 65–99)
HBA1C MFR BLD: 11.1 %
HDLC SERPL-MCNC: 56 MG/DL
LDLC SERPL CALC-MCNC: 157 MG/DL (ref 0–100)
NONHDLC SERPL-MCNC: 185 MG/DL
POTASSIUM SERPL-SCNC: 4.2 MMOL/L (ref 3.5–5.3)
PROT SERPL-MCNC: 7.8 G/DL (ref 6.4–8.2)
SODIUM SERPL-SCNC: 136 MMOL/L (ref 136–145)
TRIGL SERPL-MCNC: 142 MG/DL
TSH SERPL DL<=0.05 MIU/L-ACNC: 1.59 UIU/ML (ref 0.36–3.74)

## 2021-08-20 PROCEDURE — 84443 ASSAY THYROID STIM HORMONE: CPT

## 2021-08-20 PROCEDURE — 36415 COLL VENOUS BLD VENIPUNCTURE: CPT

## 2021-08-20 PROCEDURE — 80053 COMPREHEN METABOLIC PANEL: CPT

## 2021-08-20 PROCEDURE — 3046F HEMOGLOBIN A1C LEVEL >9.0%: CPT | Performed by: NURSE PRACTITIONER

## 2021-08-20 PROCEDURE — 83036 HEMOGLOBIN GLYCOSYLATED A1C: CPT

## 2021-08-20 PROCEDURE — 80061 LIPID PANEL: CPT

## 2021-08-20 NOTE — ASSESSMENT & PLAN NOTE
We discussed signs of infection and to monitor toe/wound closely due to DM   Xray prev obtained to rule out OM

## 2021-08-20 NOTE — ASSESSMENT & PLAN NOTE
Lab Results   Component Value Date    HGBA1C 11 3 (A) 08/19/2021   uncontrolled  Discussed medication management and insulin, today we increased metformin to 1000mg twice laws, although I did discuss with her she will need additional medication or insulin adjustment   She was encourage to monitor her sugar more closely and for close followup

## 2021-08-24 ENCOUNTER — VBI (OUTPATIENT)
Dept: ADMINISTRATIVE | Facility: OTHER | Age: 56
End: 2021-08-24

## 2021-10-13 ENCOUNTER — RA CDI HCC (OUTPATIENT)
Dept: OTHER | Facility: HOSPITAL | Age: 56
End: 2021-10-13

## 2021-10-15 PROBLEM — M06.9 RA (RHEUMATOID ARTHRITIS) (HCC): Status: ACTIVE | Noted: 2019-03-27

## 2021-11-05 ENCOUNTER — VBI (OUTPATIENT)
Dept: ADMINISTRATIVE | Facility: OTHER | Age: 56
End: 2021-11-05

## 2021-11-10 ENCOUNTER — OFFICE VISIT (OUTPATIENT)
Dept: URGENT CARE | Facility: CLINIC | Age: 56
End: 2021-11-10
Payer: COMMERCIAL

## 2021-11-10 VITALS
RESPIRATION RATE: 18 BRPM | HEIGHT: 63 IN | WEIGHT: 130 LBS | SYSTOLIC BLOOD PRESSURE: 110 MMHG | TEMPERATURE: 97.6 F | BODY MASS INDEX: 23.04 KG/M2 | HEART RATE: 82 BPM | DIASTOLIC BLOOD PRESSURE: 74 MMHG | OXYGEN SATURATION: 97 %

## 2021-11-10 DIAGNOSIS — R10.9 ABDOMINAL PAIN, UNSPECIFIED ABDOMINAL LOCATION: ICD-10-CM

## 2021-11-10 DIAGNOSIS — K59.00 CONSTIPATION, UNSPECIFIED CONSTIPATION TYPE: Primary | ICD-10-CM

## 2021-11-10 DIAGNOSIS — N94.9 VAGINAL BURNING: ICD-10-CM

## 2021-11-10 PROCEDURE — 99213 OFFICE O/P EST LOW 20 MIN: CPT | Performed by: PHYSICIAN ASSISTANT

## 2021-11-12 ENCOUNTER — HOSPITAL ENCOUNTER (EMERGENCY)
Facility: HOSPITAL | Age: 56
Discharge: HOME | End: 2021-11-12
Attending: EMERGENCY MEDICINE
Payer: COMMERCIAL

## 2021-11-12 VITALS
DIASTOLIC BLOOD PRESSURE: 85 MMHG | TEMPERATURE: 98.5 F | SYSTOLIC BLOOD PRESSURE: 151 MMHG | OXYGEN SATURATION: 98 % | HEART RATE: 75 BPM | RESPIRATION RATE: 18 BRPM

## 2021-11-12 DIAGNOSIS — K62.5 BRBPR (BRIGHT RED BLOOD PER RECTUM): Primary | ICD-10-CM

## 2021-11-12 DIAGNOSIS — K64.9 HEMORRHOID: ICD-10-CM

## 2021-11-12 LAB
ALBUMIN SERPL BCP-MCNC: 3.3 G/DL (ref 3.5–5)
ALP SERPL-CCNC: 125 U/L (ref 46–116)
ALT SERPL W P-5'-P-CCNC: 25 U/L (ref 12–78)
ANION GAP SERPL CALCULATED.3IONS-SCNC: 9 MMOL/L (ref 4–13)
AST SERPL W P-5'-P-CCNC: 10 U/L (ref 5–45)
BACTERIA UR QL AUTO: NORMAL /HPF
BASOPHILS # BLD AUTO: 0.03 THOUSANDS/ΜL (ref 0–0.1)
BASOPHILS NFR BLD AUTO: 1 % (ref 0–1)
BILIRUB SERPL-MCNC: 0.35 MG/DL (ref 0.2–1)
BILIRUB UR QL STRIP: NEGATIVE
BUN SERPL-MCNC: 14 MG/DL (ref 5–25)
CALCIUM ALBUM COR SERPL-MCNC: 8.8 MG/DL (ref 8.3–10.1)
CALCIUM SERPL-MCNC: 8.2 MG/DL (ref 8.3–10.1)
CHLORIDE SERPL-SCNC: 102 MMOL/L (ref 100–108)
CLARITY UR: CLEAR
CO2 SERPL-SCNC: 27 MMOL/L (ref 21–32)
COLOR UR: ABNORMAL
CREAT SERPL-MCNC: 0.8 MG/DL (ref 0.6–1.3)
EOSINOPHIL # BLD AUTO: 0.14 THOUSAND/ΜL (ref 0–0.61)
EOSINOPHIL NFR BLD AUTO: 2 % (ref 0–6)
ERYTHROCYTE [DISTWIDTH] IN BLOOD BY AUTOMATED COUNT: 11.6 % (ref 11.6–15.1)
GFR SERPL CREATININE-BSD FRML MDRD: 83 ML/MIN/1.73SQ M
GLUCOSE SERPL-MCNC: 442 MG/DL (ref 65–140)
GLUCOSE UR STRIP-MCNC: ABNORMAL MG/DL
HCT VFR BLD AUTO: 42.5 % (ref 34.8–46.1)
HGB BLD-MCNC: 14.1 G/DL (ref 11.5–15.4)
HGB UR QL STRIP.AUTO: NEGATIVE
IMM GRANULOCYTES # BLD AUTO: 0.02 THOUSAND/UL (ref 0–0.2)
IMM GRANULOCYTES NFR BLD AUTO: 0 % (ref 0–2)
KETONES UR STRIP-MCNC: NEGATIVE MG/DL
LEUKOCYTE ESTERASE UR QL STRIP: ABNORMAL
LYMPHOCYTES # BLD AUTO: 2.38 THOUSANDS/ΜL (ref 0.6–4.47)
LYMPHOCYTES NFR BLD AUTO: 38 % (ref 14–44)
MCH RBC QN AUTO: 29.3 PG (ref 26.8–34.3)
MCHC RBC AUTO-ENTMCNC: 33.2 G/DL (ref 31.4–37.4)
MCV RBC AUTO: 88 FL (ref 82–98)
MONOCYTES # BLD AUTO: 0.43 THOUSAND/ΜL (ref 0.17–1.22)
MONOCYTES NFR BLD AUTO: 7 % (ref 4–12)
NEUTROPHILS # BLD AUTO: 3.3 THOUSANDS/ΜL (ref 1.85–7.62)
NEUTS SEG NFR BLD AUTO: 52 % (ref 43–75)
NITRITE UR QL STRIP: NEGATIVE
NON-SQ EPI CELLS URNS QL MICRO: NORMAL /HPF
NRBC BLD AUTO-RTO: 0 /100 WBCS
PH UR STRIP.AUTO: 5.5 [PH]
PLATELET # BLD AUTO: 180 THOUSANDS/UL (ref 149–390)
PMV BLD AUTO: 9.9 FL (ref 8.9–12.7)
POTASSIUM SERPL-SCNC: 4.2 MMOL/L (ref 3.5–5.3)
PROT SERPL-MCNC: 7 G/DL (ref 6.4–8.2)
PROT UR STRIP-MCNC: NEGATIVE MG/DL
RBC # BLD AUTO: 4.81 MILLION/UL (ref 3.81–5.12)
RBC #/AREA URNS AUTO: NORMAL /HPF
SODIUM SERPL-SCNC: 138 MMOL/L (ref 136–145)
SP GR UR STRIP.AUTO: 1.01 (ref 1–1.03)
UROBILINOGEN UR QL STRIP.AUTO: 0.2 E.U./DL
WBC # BLD AUTO: 6.3 THOUSAND/UL (ref 4.31–10.16)
WBC #/AREA URNS AUTO: NORMAL /HPF

## 2021-11-12 PROCEDURE — 81001 URINALYSIS AUTO W/SCOPE: CPT | Performed by: PHYSICIAN ASSISTANT

## 2021-11-12 PROCEDURE — 36415 COLL VENOUS BLD VENIPUNCTURE: CPT

## 2021-11-12 PROCEDURE — 99285 EMERGENCY DEPT VISIT HI MDM: CPT

## 2021-11-12 PROCEDURE — 99284 EMERGENCY DEPT VISIT MOD MDM: CPT | Performed by: PHYSICIAN ASSISTANT

## 2021-11-12 PROCEDURE — 80053 COMPREHEN METABOLIC PANEL: CPT | Performed by: EMERGENCY MEDICINE

## 2021-11-12 PROCEDURE — 85025 COMPLETE CBC W/AUTO DIFF WBC: CPT | Performed by: EMERGENCY MEDICINE

## 2021-11-12 RX ORDER — HYDROCORTISONE 25 MG/G
CREAM TOPICAL ONCE
Status: COMPLETED | OUTPATIENT
Start: 2021-11-12 | End: 2021-11-12

## 2021-11-12 RX ORDER — HYDROCORTISONE 25 MG/G
CREAM TOPICAL 2 TIMES DAILY
Qty: 28 G | Refills: 0 | Status: SHIPPED | OUTPATIENT
Start: 2021-11-12

## 2021-11-12 RX ADMIN — HYDROCORTISONE: 25 CREAM TOPICAL at 16:29

## 2021-11-26 ENCOUNTER — OFFICE VISIT (OUTPATIENT)
Dept: FAMILY MEDICINE CLINIC | Facility: CLINIC | Age: 56
End: 2021-11-26
Payer: COMMERCIAL

## 2021-11-26 DIAGNOSIS — J06.9 VIRAL URI: Primary | ICD-10-CM

## 2021-11-26 PROCEDURE — 99213 OFFICE O/P EST LOW 20 MIN: CPT | Performed by: NURSE PRACTITIONER

## 2021-11-26 PROCEDURE — U0003 INFECTIOUS AGENT DETECTION BY NUCLEIC ACID (DNA OR RNA); SEVERE ACUTE RESPIRATORY SYNDROME CORONAVIRUS 2 (SARS-COV-2) (CORONAVIRUS DISEASE [COVID-19]), AMPLIFIED PROBE TECHNIQUE, MAKING USE OF HIGH THROUGHPUT TECHNOLOGIES AS DESCRIBED BY CMS-2020-01-R: HCPCS | Performed by: NURSE PRACTITIONER

## 2021-11-26 PROCEDURE — U0005 INFEC AGEN DETEC AMPLI PROBE: HCPCS | Performed by: NURSE PRACTITIONER

## 2021-11-28 ENCOUNTER — NURSE TRIAGE (OUTPATIENT)
Dept: OTHER | Facility: OTHER | Age: 56
End: 2021-11-28

## 2021-11-28 LAB — SARS-COV-2 RNA RESP QL NAA+PROBE: POSITIVE

## 2021-11-29 ENCOUNTER — TELEMEDICINE (OUTPATIENT)
Dept: FAMILY MEDICINE CLINIC | Facility: CLINIC | Age: 56
End: 2021-11-29
Payer: COMMERCIAL

## 2021-11-29 VITALS — TEMPERATURE: 100.2 F

## 2021-11-29 DIAGNOSIS — U07.1 COVID-19 VIRUS INFECTION: Primary | ICD-10-CM

## 2021-11-29 PROCEDURE — 99213 OFFICE O/P EST LOW 20 MIN: CPT | Performed by: FAMILY MEDICINE

## 2021-11-29 PROCEDURE — 1036F TOBACCO NON-USER: CPT | Performed by: FAMILY MEDICINE

## 2021-12-01 ENCOUNTER — TELEMEDICINE (OUTPATIENT)
Dept: FAMILY MEDICINE CLINIC | Facility: CLINIC | Age: 56
End: 2021-12-01
Payer: COMMERCIAL

## 2021-12-01 ENCOUNTER — TELEPHONE (OUTPATIENT)
Dept: FAMILY MEDICINE CLINIC | Facility: CLINIC | Age: 56
End: 2021-12-01

## 2021-12-01 VITALS — TEMPERATURE: 101 F

## 2021-12-01 DIAGNOSIS — U07.1 COVID-19 VIRUS INFECTION: Primary | ICD-10-CM

## 2021-12-01 PROCEDURE — 99213 OFFICE O/P EST LOW 20 MIN: CPT | Performed by: FAMILY MEDICINE

## 2021-12-01 RX ORDER — AZITHROMYCIN 250 MG/1
TABLET, FILM COATED ORAL
Qty: 6 TABLET | Refills: 1 | Status: SHIPPED | OUTPATIENT
Start: 2021-12-01 | End: 2021-12-06

## 2021-12-03 ENCOUNTER — TELEMEDICINE (OUTPATIENT)
Dept: FAMILY MEDICINE CLINIC | Facility: CLINIC | Age: 56
End: 2021-12-03
Payer: COMMERCIAL

## 2021-12-03 ENCOUNTER — VBI (OUTPATIENT)
Dept: ADMINISTRATIVE | Facility: OTHER | Age: 56
End: 2021-12-03

## 2021-12-03 VITALS — TEMPERATURE: 98.7 F

## 2021-12-03 DIAGNOSIS — U07.1 COVID-19 VIRUS INFECTION: Primary | ICD-10-CM

## 2021-12-03 PROCEDURE — 99213 OFFICE O/P EST LOW 20 MIN: CPT | Performed by: FAMILY MEDICINE

## 2021-12-08 ENCOUNTER — TELEMEDICINE (OUTPATIENT)
Dept: FAMILY MEDICINE CLINIC | Facility: CLINIC | Age: 56
End: 2021-12-08
Payer: COMMERCIAL

## 2021-12-08 DIAGNOSIS — U07.1 COVID-19 VIRUS INFECTION: Primary | ICD-10-CM

## 2021-12-08 PROCEDURE — 99213 OFFICE O/P EST LOW 20 MIN: CPT | Performed by: FAMILY MEDICINE

## 2021-12-08 RX ORDER — DEXTROMETHORPHAN HYDROBROMIDE AND PROMETHAZINE HYDROCHLORIDE 15; 6.25 MG/5ML; MG/5ML
10 SOLUTION ORAL 3 TIMES DAILY PRN
Qty: 240 ML | Refills: 1 | Status: SHIPPED | OUTPATIENT
Start: 2021-12-08

## 2021-12-16 ENCOUNTER — OFFICE VISIT (OUTPATIENT)
Dept: FAMILY MEDICINE CLINIC | Facility: CLINIC | Age: 56
End: 2021-12-16
Payer: COMMERCIAL

## 2021-12-16 ENCOUNTER — APPOINTMENT (OUTPATIENT)
Dept: RADIOLOGY | Facility: CLINIC | Age: 56
End: 2021-12-16
Payer: COMMERCIAL

## 2021-12-16 ENCOUNTER — APPOINTMENT (OUTPATIENT)
Dept: LAB | Facility: CLINIC | Age: 56
End: 2021-12-16
Payer: COMMERCIAL

## 2021-12-16 VITALS
OXYGEN SATURATION: 95 % | HEIGHT: 63 IN | TEMPERATURE: 100 F | HEART RATE: 82 BPM | DIASTOLIC BLOOD PRESSURE: 62 MMHG | RESPIRATION RATE: 20 BRPM | BODY MASS INDEX: 21.92 KG/M2 | WEIGHT: 123.7 LBS | SYSTOLIC BLOOD PRESSURE: 112 MMHG

## 2021-12-16 DIAGNOSIS — Z12.31 VISIT FOR SCREENING MAMMOGRAM: ICD-10-CM

## 2021-12-16 DIAGNOSIS — R53.82 POST-COVID CHRONIC FATIGUE: ICD-10-CM

## 2021-12-16 DIAGNOSIS — M05.9 RHEUMATOID ARTHRITIS WITH POSITIVE RHEUMATOID FACTOR, INVOLVING UNSPECIFIED SITE (HCC): ICD-10-CM

## 2021-12-16 DIAGNOSIS — U09.9 POST-COVID CHRONIC FATIGUE: ICD-10-CM

## 2021-12-16 DIAGNOSIS — U07.1 COVID-19 VIRUS INFECTION: ICD-10-CM

## 2021-12-16 DIAGNOSIS — IMO0002 UNCONTROLLED TYPE 2 DIABETES MELLITUS, WITHOUT LONG-TERM CURRENT USE OF INSULIN: ICD-10-CM

## 2021-12-16 DIAGNOSIS — U07.1 COVID-19 VIRUS INFECTION: Primary | ICD-10-CM

## 2021-12-16 PROBLEM — G93.32 POST-COVID CHRONIC FATIGUE: Status: ACTIVE | Noted: 2021-12-16

## 2021-12-16 LAB
ALBUMIN SERPL BCP-MCNC: 3.6 G/DL (ref 3.5–5)
ALP SERPL-CCNC: 126 U/L (ref 46–116)
ALT SERPL W P-5'-P-CCNC: 37 U/L (ref 12–78)
ANION GAP SERPL CALCULATED.3IONS-SCNC: 10 MMOL/L (ref 4–13)
AST SERPL W P-5'-P-CCNC: 17 U/L (ref 5–45)
BASOPHILS # BLD AUTO: 0.03 THOUSANDS/ΜL (ref 0–0.1)
BASOPHILS NFR BLD AUTO: 1 % (ref 0–1)
BILIRUB SERPL-MCNC: 0.6 MG/DL (ref 0.2–1)
BUN SERPL-MCNC: 15 MG/DL (ref 5–25)
CALCIUM SERPL-MCNC: 9.5 MG/DL (ref 8.3–10.1)
CHLORIDE SERPL-SCNC: 102 MMOL/L (ref 100–108)
CO2 SERPL-SCNC: 23 MMOL/L (ref 21–32)
CREAT SERPL-MCNC: 0.78 MG/DL (ref 0.6–1.3)
EOSINOPHIL # BLD AUTO: 0.06 THOUSAND/ΜL (ref 0–0.61)
EOSINOPHIL NFR BLD AUTO: 1 % (ref 0–6)
ERYTHROCYTE [DISTWIDTH] IN BLOOD BY AUTOMATED COUNT: 12.1 % (ref 11.6–15.1)
GFR SERPL CREATININE-BSD FRML MDRD: 85 ML/MIN/1.73SQ M
GLUCOSE SERPL-MCNC: 388 MG/DL (ref 65–140)
HCT VFR BLD AUTO: 43.8 % (ref 34.8–46.1)
HGB BLD-MCNC: 14.2 G/DL (ref 11.5–15.4)
IMM GRANULOCYTES # BLD AUTO: 0.02 THOUSAND/UL (ref 0–0.2)
IMM GRANULOCYTES NFR BLD AUTO: 0 % (ref 0–2)
LYMPHOCYTES # BLD AUTO: 2.23 THOUSANDS/ΜL (ref 0.6–4.47)
LYMPHOCYTES NFR BLD AUTO: 39 % (ref 14–44)
MCH RBC QN AUTO: 28.4 PG (ref 26.8–34.3)
MCHC RBC AUTO-ENTMCNC: 32.4 G/DL (ref 31.4–37.4)
MCV RBC AUTO: 88 FL (ref 82–98)
MONOCYTES # BLD AUTO: 0.43 THOUSAND/ΜL (ref 0.17–1.22)
MONOCYTES NFR BLD AUTO: 8 % (ref 4–12)
NEUTROPHILS # BLD AUTO: 2.97 THOUSANDS/ΜL (ref 1.85–7.62)
NEUTS SEG NFR BLD AUTO: 51 % (ref 43–75)
NRBC BLD AUTO-RTO: 0 /100 WBCS
PLATELET # BLD AUTO: 302 THOUSANDS/UL (ref 149–390)
PMV BLD AUTO: 9.8 FL (ref 8.9–12.7)
POTASSIUM SERPL-SCNC: 4 MMOL/L (ref 3.5–5.3)
PROT SERPL-MCNC: 8.3 G/DL (ref 6.4–8.2)
RBC # BLD AUTO: 5 MILLION/UL (ref 3.81–5.12)
SODIUM SERPL-SCNC: 135 MMOL/L (ref 136–145)
WBC # BLD AUTO: 5.74 THOUSAND/UL (ref 4.31–10.16)

## 2021-12-16 PROCEDURE — 80053 COMPREHEN METABOLIC PANEL: CPT

## 2021-12-16 PROCEDURE — 71046 X-RAY EXAM CHEST 2 VIEWS: CPT

## 2021-12-16 PROCEDURE — 36415 COLL VENOUS BLD VENIPUNCTURE: CPT

## 2021-12-16 PROCEDURE — 99214 OFFICE O/P EST MOD 30 MIN: CPT | Performed by: FAMILY MEDICINE

## 2021-12-16 PROCEDURE — 85025 COMPLETE CBC W/AUTO DIFF WBC: CPT

## 2021-12-17 ENCOUNTER — TELEPHONE (OUTPATIENT)
Dept: FAMILY MEDICINE CLINIC | Facility: CLINIC | Age: 56
End: 2021-12-17

## 2021-12-17 DIAGNOSIS — U07.1 COVID-19 VIRUS INFECTION: Primary | ICD-10-CM

## 2021-12-17 RX ORDER — BUDESONIDE AND FORMOTEROL FUMARATE DIHYDRATE 160; 4.5 UG/1; UG/1
2 AEROSOL RESPIRATORY (INHALATION) 2 TIMES DAILY
Qty: 10.2 G | Refills: 1 | Status: SHIPPED | OUTPATIENT
Start: 2021-12-17

## 2021-12-18 DIAGNOSIS — IMO0002 UNCONTROLLED TYPE 2 DIABETES MELLITUS, WITHOUT LONG-TERM CURRENT USE OF INSULIN: ICD-10-CM

## 2021-12-20 RX ORDER — PEN NEEDLE, DIABETIC 32GX 5/32"
NEEDLE, DISPOSABLE MISCELLANEOUS
Qty: 30 EACH | Refills: 11 | Status: SHIPPED | OUTPATIENT
Start: 2021-12-20

## 2021-12-21 ENCOUNTER — OFFICE VISIT (OUTPATIENT)
Dept: FAMILY MEDICINE CLINIC | Facility: CLINIC | Age: 56
End: 2021-12-21
Payer: COMMERCIAL

## 2021-12-21 VITALS
HEART RATE: 75 BPM | OXYGEN SATURATION: 95 % | SYSTOLIC BLOOD PRESSURE: 118 MMHG | DIASTOLIC BLOOD PRESSURE: 70 MMHG | WEIGHT: 123 LBS | RESPIRATION RATE: 18 BRPM | HEIGHT: 63 IN | BODY MASS INDEX: 21.79 KG/M2 | TEMPERATURE: 98.6 F

## 2021-12-21 DIAGNOSIS — K21.9 GASTROESOPHAGEAL REFLUX DISEASE WITHOUT ESOPHAGITIS: ICD-10-CM

## 2021-12-21 DIAGNOSIS — U09.9 POST-COVID CHRONIC FATIGUE: Primary | ICD-10-CM

## 2021-12-21 DIAGNOSIS — IMO0002 UNCONTROLLED TYPE 2 DIABETES MELLITUS, WITHOUT LONG-TERM CURRENT USE OF INSULIN: ICD-10-CM

## 2021-12-21 DIAGNOSIS — B37.81 THRUSH OF MOUTH AND ESOPHAGUS (HCC): ICD-10-CM

## 2021-12-21 DIAGNOSIS — B37.0 THRUSH OF MOUTH AND ESOPHAGUS (HCC): ICD-10-CM

## 2021-12-21 DIAGNOSIS — R53.82 POST-COVID CHRONIC FATIGUE: Primary | ICD-10-CM

## 2021-12-21 PROCEDURE — 3008F BODY MASS INDEX DOCD: CPT | Performed by: FAMILY MEDICINE

## 2021-12-21 PROCEDURE — 1036F TOBACCO NON-USER: CPT | Performed by: FAMILY MEDICINE

## 2021-12-21 PROCEDURE — 3078F DIAST BP <80 MM HG: CPT | Performed by: FAMILY MEDICINE

## 2021-12-21 PROCEDURE — 99396 PREV VISIT EST AGE 40-64: CPT | Performed by: FAMILY MEDICINE

## 2021-12-21 PROCEDURE — 3074F SYST BP LT 130 MM HG: CPT | Performed by: FAMILY MEDICINE

## 2022-01-12 ENCOUNTER — VBI (OUTPATIENT)
Dept: ADMINISTRATIVE | Facility: OTHER | Age: 57
End: 2022-01-12

## 2022-01-14 DIAGNOSIS — I10 ESSENTIAL HYPERTENSION: ICD-10-CM

## 2022-01-14 DIAGNOSIS — IMO0002 UNCONTROLLED TYPE 2 DIABETES MELLITUS, WITHOUT LONG-TERM CURRENT USE OF INSULIN: ICD-10-CM

## 2022-01-14 DIAGNOSIS — K21.9 GASTROESOPHAGEAL REFLUX DISEASE WITHOUT ESOPHAGITIS: ICD-10-CM

## 2022-01-14 RX ORDER — INSULIN DEGLUDEC 200 U/ML
INJECTION, SOLUTION SUBCUTANEOUS
Qty: 9 ML | Refills: 5 | Status: SHIPPED | OUTPATIENT
Start: 2022-01-14

## 2022-04-08 ENCOUNTER — OFFICE VISIT (OUTPATIENT)
Dept: FAMILY MEDICINE CLINIC | Facility: CLINIC | Age: 57
End: 2022-04-08
Payer: COMMERCIAL

## 2022-04-08 VITALS
HEART RATE: 80 BPM | HEIGHT: 63 IN | DIASTOLIC BLOOD PRESSURE: 62 MMHG | OXYGEN SATURATION: 97 % | BODY MASS INDEX: 21.62 KG/M2 | WEIGHT: 122 LBS | RESPIRATION RATE: 18 BRPM | SYSTOLIC BLOOD PRESSURE: 110 MMHG | TEMPERATURE: 98.9 F

## 2022-04-08 DIAGNOSIS — K64.0 GRADE I HEMORRHOIDS: ICD-10-CM

## 2022-04-08 DIAGNOSIS — R31.0 GROSS HEMATURIA: ICD-10-CM

## 2022-04-08 DIAGNOSIS — N93.9 VAGINAL BLEEDING: Primary | ICD-10-CM

## 2022-04-08 PROCEDURE — 3074F SYST BP LT 130 MM HG: CPT | Performed by: FAMILY MEDICINE

## 2022-04-08 PROCEDURE — 3008F BODY MASS INDEX DOCD: CPT | Performed by: FAMILY MEDICINE

## 2022-04-08 PROCEDURE — 99214 OFFICE O/P EST MOD 30 MIN: CPT | Performed by: FAMILY MEDICINE

## 2022-04-08 PROCEDURE — 3078F DIAST BP <80 MM HG: CPT | Performed by: FAMILY MEDICINE

## 2022-04-08 PROCEDURE — 3725F SCREEN DEPRESSION PERFORMED: CPT | Performed by: FAMILY MEDICINE

## 2022-04-08 PROCEDURE — 1036F TOBACCO NON-USER: CPT | Performed by: FAMILY MEDICINE

## 2022-04-08 PROCEDURE — G0145 SCR C/V CYTO,THINLAYER,RESCR: HCPCS | Performed by: FAMILY MEDICINE

## 2022-04-08 NOTE — PROGRESS NOTES
Assessment/Plan:       Problem List Items Addressed This Visit        Digestive    Grade I hemorrhoids     First-degree hemorrhoids with anal pruritus apply hydrocortisone cream Anusol            Genitourinary    Gross hematuria     Unclear true etiology of hematuria will check urinalysis            Other    Vaginal bleeding - Primary     Unknown etiology patient notes that she seen blood on the toilet tissue after urinating was concerned if this was her bladder although she had no fever or dysuria frequency or urgency or signs of urinary tract infection  She denied blood in her underwear but at time saw spotting in her pajamas overnight and was unclear if this was again urine or vaginal   We discussed etiology as potential from the uterus or other source and on examination she does have a friable cervix no discrete blood seen in the vaginal vault today  I recommend a pelvic ultrasound in light of the clinical picture and she understood the directions at this time Pap test was completed and urinalysis was negative for blood this will be sent out                 Subjective:      Patient ID: Elizabeth Fernandez is a 65 y o  female  Vaginal bleeding potential dysfunctional uterine bleeding verses hematuria UTI      The following portions of the patient's history were reviewed and updated as appropriate: allergies, current medications, past family history, past medical history, past social history, past surgical history and problem list     Review of Systems   Constitutional: Negative for chills, fatigue and fever  HENT: Negative for congestion, nosebleeds, rhinorrhea, sinus pressure and sore throat  Eyes: Negative for discharge and redness  Respiratory: Negative for cough and shortness of breath  Cardiovascular: Negative for chest pain, palpitations and leg swelling  Gastrointestinal: Negative for abdominal pain, blood in stool and nausea     Endocrine: Negative for cold intolerance, heat intolerance and polyuria  Genitourinary: Positive for vaginal bleeding  Negative for dysuria and frequency  Musculoskeletal: Negative for arthralgias, back pain and myalgias  Skin: Negative for rash  Neurological: Negative for dizziness, weakness and headaches  Hematological: Negative for adenopathy  Psychiatric/Behavioral: Negative for behavioral problems and sleep disturbance  The patient is not nervous/anxious  Objective:      /62 (BP Location: Left arm, Patient Position: Sitting)   Pulse 80   Temp 98 9 °F (37 2 °C)   Resp 18   Ht 5' 3" (1 6 m)   Wt 55 3 kg (122 lb)   SpO2 97%   BMI 21 61 kg/m²        Physical Exam  Vitals and nursing note reviewed  Constitutional:       General: She is not in acute distress  Appearance: She is well-developed  HENT:      Head: Normocephalic and atraumatic  Right Ear: External ear normal       Left Ear: External ear normal       Nose: Nose normal       Mouth/Throat:      Pharynx: No oropharyngeal exudate  Eyes:      General: No scleral icterus  Right eye: No discharge  Left eye: No discharge  Conjunctiva/sclera: Conjunctivae normal       Pupils: Pupils are equal, round, and reactive to light  Neck:      Thyroid: No thyromegaly  Vascular: No JVD  Cardiovascular:      Rate and Rhythm: Normal rate and regular rhythm  Heart sounds: Normal heart sounds  No murmur heard  Pulmonary:      Effort: Pulmonary effort is normal       Breath sounds: No wheezing or rales  Chest:      Chest wall: No tenderness  Abdominal:      General: Bowel sounds are normal  There is no distension  Palpations: Abdomen is soft  There is no mass  Tenderness: There is no abdominal tenderness  Genitourinary:     General: Normal vulva  Vagina: Vaginal discharge present  Musculoskeletal:         General: No tenderness or deformity  Normal range of motion  Cervical back: Normal range of motion     Lymphadenopathy: Cervical: No cervical adenopathy  Skin:     General: Skin is warm and dry  Findings: No rash  Neurological:      Mental Status: She is alert and oriented to person, place, and time  Cranial Nerves: No cranial nerve deficit  Coordination: Coordination normal       Deep Tendon Reflexes: Reflexes are normal and symmetric  Reflexes normal    Psychiatric:         Behavior: Behavior normal          Thought Content: Thought content normal          Judgment: Judgment normal           Data:    Laboratory Results: I have personally reviewed the pertinent laboratory results/reports   Radiology/Other Diagnostic Testing Results: I have personally reviewed pertinent reports         Lab Results   Component Value Date    WBC 5 74 12/16/2021    HGB 14 2 12/16/2021    HCT 43 8 12/16/2021    MCV 88 12/16/2021     12/16/2021     Lab Results   Component Value Date     05/13/2015    K 4 0 12/16/2021     12/16/2021    CO2 23 12/16/2021    ANIONGAP 5 05/13/2015    BUN 15 12/16/2021    CREATININE 0 78 12/16/2021    GLUCOSE 194 (H) 05/13/2015    GLUF 233 (H) 08/20/2021    CALCIUM 9 5 12/16/2021    CORRECTEDCA 8 8 11/12/2021    AST 17 12/16/2021    ALT 37 12/16/2021    ALKPHOS 126 (H) 12/16/2021    PROT 7 8 05/13/2015    BILITOT 0 3 05/13/2015    EGFR 85 12/16/2021     Lab Results   Component Value Date    CHOLESTEROL 241 (H) 08/20/2021    CHOLESTEROL 217 (H) 03/13/2021    CHOLESTEROL 203 (H) 05/13/2019     Lab Results   Component Value Date    HDL 56 08/20/2021    HDL 58 03/13/2021    HDL 54 05/13/2019     Lab Results   Component Value Date    LDLCALC 157 (H) 08/20/2021    LDLCALC 131 (H) 03/13/2021    LDLCALC 107 (H) 05/13/2019     Lab Results   Component Value Date    TRIG 142 08/20/2021    TRIG 139 03/13/2021    TRIG 211 (H) 05/13/2019     No results found for: [place], [place]  Lab Results   Component Value Date    [id number] 1 590 08/20/2021     Lab Results   Component Value Date    HGBA1C 11 1 (H) 08/20/2021     No results found for: SATINDER Aguayo, DO

## 2022-04-08 NOTE — ASSESSMENT & PLAN NOTE
Unknown etiology patient notes that she seen blood on the toilet tissue after urinating was concerned if this was her bladder although she had no fever or dysuria frequency or urgency or signs of urinary tract infection  She denied blood in her underwear but at time saw spotting in her pajamas overnight and was unclear if this was again urine or vaginal   We discussed etiology as potential from the uterus or other source and on examination she does have a friable cervix no discrete blood seen in the vaginal vault today    I recommend a pelvic ultrasound in light of the clinical picture and she understood the directions at this time Pap test was completed and urinalysis was negative for blood this will be sent out

## 2022-04-15 ENCOUNTER — HOSPITAL ENCOUNTER (OUTPATIENT)
Dept: ULTRASOUND IMAGING | Facility: HOSPITAL | Age: 57
Discharge: HOME | End: 2022-04-15
Payer: COMMERCIAL

## 2022-04-15 DIAGNOSIS — N93.9 VAGINAL BLEEDING: ICD-10-CM

## 2022-04-15 LAB
LAB AP GYN PRIMARY INTERPRETATION: NORMAL
Lab: NORMAL

## 2022-04-15 PROCEDURE — 76830 TRANSVAGINAL US NON-OB: CPT

## 2022-04-15 PROCEDURE — 76856 US EXAM PELVIC COMPLETE: CPT

## 2022-05-04 ENCOUNTER — OFFICE VISIT (OUTPATIENT)
Dept: FAMILY MEDICINE CLINIC | Facility: CLINIC | Age: 57
End: 2022-05-04
Payer: COMMERCIAL

## 2022-05-04 VITALS
RESPIRATION RATE: 18 BRPM | OXYGEN SATURATION: 97 % | DIASTOLIC BLOOD PRESSURE: 70 MMHG | BODY MASS INDEX: 20.87 KG/M2 | SYSTOLIC BLOOD PRESSURE: 100 MMHG | HEART RATE: 57 BPM | TEMPERATURE: 98.5 F | WEIGHT: 117.8 LBS | HEIGHT: 63 IN

## 2022-05-04 DIAGNOSIS — K21.9 GASTROESOPHAGEAL REFLUX DISEASE WITHOUT ESOPHAGITIS: ICD-10-CM

## 2022-05-04 DIAGNOSIS — I10 PRIMARY HYPERTENSION: ICD-10-CM

## 2022-05-04 DIAGNOSIS — E11.65 TYPE 2 DIABETES MELLITUS WITH HYPERGLYCEMIA, WITH LONG-TERM CURRENT USE OF INSULIN (HCC): Primary | ICD-10-CM

## 2022-05-04 DIAGNOSIS — M05.9 RHEUMATOID ARTHRITIS WITH POSITIVE RHEUMATOID FACTOR, INVOLVING UNSPECIFIED SITE (HCC): ICD-10-CM

## 2022-05-04 DIAGNOSIS — Z79.4 TYPE 2 DIABETES MELLITUS WITH HYPERGLYCEMIA, WITH LONG-TERM CURRENT USE OF INSULIN (HCC): Primary | ICD-10-CM

## 2022-05-04 DIAGNOSIS — N93.9 VAGINAL BLEEDING: ICD-10-CM

## 2022-05-04 LAB
CREAT UR-MCNC: 130 MG/DL
MICROALBUMIN UR-MCNC: 11.8 MG/L (ref 0–20)
MICROALBUMIN/CREAT 24H UR: 9 MG/G CREATININE (ref 0–30)
SL AMB POCT HEMOGLOBIN AIC: 14 (ref ?–6.5)

## 2022-05-04 PROCEDURE — 99214 OFFICE O/P EST MOD 30 MIN: CPT | Performed by: FAMILY MEDICINE

## 2022-05-04 PROCEDURE — 3078F DIAST BP <80 MM HG: CPT | Performed by: FAMILY MEDICINE

## 2022-05-04 PROCEDURE — 3074F SYST BP LT 130 MM HG: CPT | Performed by: FAMILY MEDICINE

## 2022-05-04 PROCEDURE — 1036F TOBACCO NON-USER: CPT | Performed by: FAMILY MEDICINE

## 2022-05-04 PROCEDURE — 82043 UR ALBUMIN QUANTITATIVE: CPT | Performed by: FAMILY MEDICINE

## 2022-05-04 PROCEDURE — 3061F NEG MICROALBUMINURIA REV: CPT | Performed by: FAMILY MEDICINE

## 2022-05-04 PROCEDURE — 82570 ASSAY OF URINE CREATININE: CPT | Performed by: FAMILY MEDICINE

## 2022-05-04 PROCEDURE — 3046F HEMOGLOBIN A1C LEVEL >9.0%: CPT | Performed by: FAMILY MEDICINE

## 2022-05-04 PROCEDURE — 3008F BODY MASS INDEX DOCD: CPT | Performed by: FAMILY MEDICINE

## 2022-05-04 PROCEDURE — 83036 HEMOGLOBIN GLYCOSYLATED A1C: CPT | Performed by: FAMILY MEDICINE

## 2022-05-04 NOTE — ASSESSMENT & PLAN NOTE
Lab Results   Component Value Date    HGBA1C 11 1 (H) 08/20/2021   DB under poor control right now patient was mistaken on her insulin instructions and was just injecting 2 units of the [place] daily I and encouraged her now to start with 20 units daily she can split this 20 units and 20 units 7:00 a m  And 7:00 p m  She will monitor her blood sugars closely she is picking up her freestyle [place] continuous monitor and will contact me in 1 week for follow-up instructions    A1c done here at the office along with microalbumin showing that her numbers up at 14 she has been under very poor control as a result not taking her insulin properly so she will now resume the insulin check closely and follow up with me closely

## 2022-05-04 NOTE — ASSESSMENT & PLAN NOTE
History of vaginal bleeding reviewed ultrasound of the pelvis at this point and if symptoms continue in worsen she will follow-up with gynecology    For now once her blood sugars under better control symptoms may improve from vaginal dryness to other symptoms throughout her body and we will re-evaluate her closely after the next 1-2 months

## 2022-05-04 NOTE — PROGRESS NOTES
Assessment/Plan:       Problem List Items Addressed This Visit        Digestive    GERD (gastroesophageal reflux disease)     Continue same medication treatment omeprazole 20 mg            Endocrine    Type 2 diabetes mellitus with hyperglycemia ([facility] ) - Primary       Lab Results   Component Value Date    HGBA1C 11 1 (H) 08/20/2021   DB under poor control right now patient was mistaken on her insulin instructions and was just injecting 2 units of the [place] daily I and encouraged her now to start with 20 units daily she can split this 20 units and 20 units 7:00 a m  And 7:00 p m  She will monitor her blood sugars closely she is picking up her freestyle [place] continuous monitor and will contact me in 1 week for follow-up instructions  A1c done here at the office along with microalbumin showing that her numbers up at 14 she has been under very poor control as a result not taking her insulin properly so she will now resume the insulin check closely and follow up with me closely         Relevant Orders    POCT hemoglobin A1c    Microalbumin / creatinine urine ratio       Cardiovascular and Mediastinum    Hypertension     Patient will continue with current regimen and treatment plan  Musculoskeletal and Integument    RA (rheumatoid arthritis) (HCC)       Other    Vaginal bleeding     History of vaginal bleeding reviewed ultrasound of the pelvis at this point and if symptoms continue in worsen she will follow-up with gynecology  For now once her blood sugars under better control symptoms may improve from vaginal dryness to other symptoms throughout her body and we will re-evaluate her closely after the next 1-2 months                 Subjective:      Patient ID: Mallorie Reyna is a 65 y o  female      Follow-up evaluation and discussion regarding pelvic ultrasound and diabetes      The following portions of the patient's history were reviewed and updated as appropriate: allergies, current medications, past family history, past medical history, past social history, past surgical history and problem list     Review of Systems   Constitutional: Positive for fatigue  Negative for chills and fever  HENT: Negative for congestion, nosebleeds, rhinorrhea, sinus pressure and sore throat  Eyes: Negative for discharge and redness  Respiratory: Negative for cough and shortness of breath  Cardiovascular: Negative for chest pain, palpitations and leg swelling  Gastrointestinal: Negative for abdominal pain, blood in stool and nausea  Endocrine: Negative for cold intolerance, heat intolerance and polyuria  Genitourinary: Negative for dysuria and frequency  Musculoskeletal: Negative for arthralgias, back pain and myalgias  Skin: Negative for rash  Neurological: Negative for dizziness, weakness and headaches  Hematological: Negative for adenopathy  Psychiatric/Behavioral: Negative for behavioral problems and sleep disturbance  The patient is not nervous/anxious  Objective:      /70 (BP Location: Left arm, Patient Position: Sitting)   Pulse 57   Temp 98 5 °F (36 9 °C)   Resp 18   Ht 5' 3" (1 6 m)   Wt 53 4 kg (117 lb 12 8 oz)   SpO2 97%   BMI 20 87 kg/m²        Physical Exam  Vitals and nursing note reviewed  Constitutional:       General: She is not in acute distress  Appearance: Normal appearance  She is well-developed  HENT:      Head: Normocephalic and atraumatic  Right Ear: Tympanic membrane and external ear normal       Left Ear: Tympanic membrane and external ear normal       Nose: Nose normal       Mouth/Throat:      Pharynx: No oropharyngeal exudate  Eyes:      General: No scleral icterus  Right eye: No discharge  Left eye: No discharge  Conjunctiva/sclera: Conjunctivae normal       Pupils: Pupils are equal, round, and reactive to light  Neck:      Thyroid: No thyromegaly  Vascular: No JVD     Cardiovascular:      Rate and Rhythm: Normal rate and regular rhythm  Heart sounds: Normal heart sounds  No murmur heard  Pulmonary:      Effort: Pulmonary effort is normal       Breath sounds: No wheezing or rales  Chest:      Chest wall: No tenderness  Abdominal:      General: Bowel sounds are normal  There is no distension  Palpations: Abdomen is soft  There is no mass  Tenderness: There is no abdominal tenderness  Musculoskeletal:         General: No tenderness or deformity  Normal range of motion  Cervical back: Normal range of motion  Lymphadenopathy:      Cervical: No cervical adenopathy  Skin:     General: Skin is warm and dry  Findings: No rash  Neurological:      Mental Status: She is alert and oriented to person, place, and time  Cranial Nerves: No cranial nerve deficit  Coordination: Coordination normal       Deep Tendon Reflexes: Reflexes are normal and symmetric  Reflexes normal    Psychiatric:         Mood and Affect: Mood normal          Behavior: Behavior normal          Thought Content: Thought content normal          Judgment: Judgment normal           Data:    Laboratory Results: I have personally reviewed the pertinent laboratory results/reports   Radiology/Other Diagnostic Testing Results: I have personally reviewed pertinent reports         Lab Results   Component Value Date    WBC 5 74 12/16/2021    HGB 14 2 12/16/2021    HCT 43 8 12/16/2021    MCV 88 12/16/2021     12/16/2021     Lab Results   Component Value Date     05/13/2015    K 4 0 12/16/2021     12/16/2021    CO2 23 12/16/2021    ANIONGAP 5 05/13/2015    BUN 15 12/16/2021    CREATININE 0 78 12/16/2021    GLUCOSE 194 (H) 05/13/2015    GLUF 233 (H) 08/20/2021    CALCIUM 9 5 12/16/2021    CORRECTEDCA 8 8 11/12/2021    AST 17 12/16/2021    ALT 37 12/16/2021    ALKPHOS 126 (H) 12/16/2021    PROT 7 8 05/13/2015    BILITOT 0 3 05/13/2015    EGFR 85 12/16/2021     Lab Results   Component Value Date    CHOLESTEROL 241 (H) 08/20/2021    CHOLESTEROL 217 (H) 03/13/2021    CHOLESTEROL 203 (H) 05/13/2019     Lab Results   Component Value Date    HDL 56 08/20/2021    HDL 58 03/13/2021    HDL 54 05/13/2019     Lab Results   Component Value Date    LDLCALC 157 (H) 08/20/2021    LDLCALC 131 (H) 03/13/2021    LDLCALC 107 (H) 05/13/2019     Lab Results   Component Value Date    TRIG 142 08/20/2021    TRIG 139 03/13/2021    TRIG 211 (H) 05/13/2019     No results found for: [place], [place]  Lab Results   Component Value Date    [id number] 1 590 08/20/2021     Lab Results   Component Value Date    HGBA1C 11 1 (H) 08/20/2021     No results found for: SATINDER Aguayo, DO

## 2022-05-06 ENCOUNTER — VBI (OUTPATIENT)
Dept: ADMINISTRATIVE | Facility: OTHER | Age: 57
End: 2022-05-06

## 2022-05-07 DIAGNOSIS — E11.65 TYPE 2 DIABETES MELLITUS WITH HYPERGLYCEMIA, WITH LONG-TERM CURRENT USE OF INSULIN (HCC): ICD-10-CM

## 2022-05-07 DIAGNOSIS — I10 ESSENTIAL HYPERTENSION: ICD-10-CM

## 2022-05-07 DIAGNOSIS — Z79.4 TYPE 2 DIABETES MELLITUS WITH HYPERGLYCEMIA, WITH LONG-TERM CURRENT USE OF INSULIN (HCC): ICD-10-CM

## 2022-05-07 DIAGNOSIS — E78.2 MIXED HYPERLIPIDEMIA: ICD-10-CM

## 2022-05-09 RX ORDER — FLASH GLUCOSE SENSOR
KIT MISCELLANEOUS
Qty: 1 EACH | Refills: 26 | Status: SHIPPED | OUTPATIENT
Start: 2022-05-09

## 2022-05-10 DIAGNOSIS — E11.65 TYPE 2 DIABETES MELLITUS WITH HYPERGLYCEMIA, WITH LONG-TERM CURRENT USE OF INSULIN (HCC): Primary | ICD-10-CM

## 2022-05-10 DIAGNOSIS — Z79.4 TYPE 2 DIABETES MELLITUS WITH HYPERGLYCEMIA, WITH LONG-TERM CURRENT USE OF INSULIN (HCC): Primary | ICD-10-CM

## 2022-05-10 RX ORDER — FLASH GLUCOSE SCANNING READER
1 EACH MISCELLANEOUS 4 TIMES DAILY
Qty: 1 EACH | Refills: 0 | Status: SHIPPED | OUTPATIENT
Start: 2022-05-10

## 2022-05-24 ENCOUNTER — RA CDI HCC (OUTPATIENT)
Dept: OTHER | Facility: HOSPITAL | Age: 57
End: 2022-05-24

## 2022-05-24 NOTE — PROGRESS NOTES
[facility]  coding opportunities       Chart reviewed, no opportunity found: CHART REVIEWED, NO OPPORTUNITY FOUND        Patients Insurance        Commercial Insurance: [address] [place] [address]

## 2022-05-31 ENCOUNTER — VBI (OUTPATIENT)
Dept: ADMINISTRATIVE | Facility: OTHER | Age: 57
End: 2022-05-31

## 2022-07-07 ENCOUNTER — VBI (OUTPATIENT)
Dept: ADMINISTRATIVE | Facility: OTHER | Age: 57
End: 2022-07-07

## 2022-07-18 ENCOUNTER — VBI (OUTPATIENT)
Dept: ADMINISTRATIVE | Facility: OTHER | Age: 57
End: 2022-07-18

## 2022-07-19 DIAGNOSIS — Z79.4 TYPE 2 DIABETES MELLITUS WITH HYPERGLYCEMIA, WITH LONG-TERM CURRENT USE OF INSULIN (HCC): Primary | ICD-10-CM

## 2022-07-19 DIAGNOSIS — E11.65 TYPE 2 DIABETES MELLITUS WITH HYPERGLYCEMIA, WITH LONG-TERM CURRENT USE OF INSULIN (HCC): Primary | ICD-10-CM

## 2022-07-19 RX ORDER — LANCETS 33 GAUGE
EACH MISCELLANEOUS 4 TIMES DAILY
Qty: 300 EACH | Refills: 2 | Status: SHIPPED | OUTPATIENT
Start: 2022-07-19 | End: 2022-08-31 | Stop reason: SDUPTHER

## 2022-07-19 RX ORDER — BLOOD-GLUCOSE METER
EACH MISCELLANEOUS 4 TIMES DAILY
Qty: 1 KIT | Refills: 0 | Status: SHIPPED | OUTPATIENT
Start: 2022-07-19 | End: 2022-08-31 | Stop reason: SDUPTHER

## 2022-07-19 RX ORDER — BLOOD SUGAR DIAGNOSTIC
STRIP MISCELLANEOUS
Qty: 300 EACH | Refills: 2 | Status: SHIPPED | OUTPATIENT
Start: 2022-07-19 | End: 2022-08-31 | Stop reason: SDUPTHER

## 2022-08-25 ENCOUNTER — VBI (OUTPATIENT)
Dept: ADMINISTRATIVE | Facility: OTHER | Age: 57
End: 2022-08-25

## 2022-08-31 DIAGNOSIS — I10 ESSENTIAL HYPERTENSION: ICD-10-CM

## 2022-08-31 DIAGNOSIS — E11.00 TYPE II DIABETES MELLITUS WITH HYPEROSMOLARITY, UNCONTROLLED (HCC): ICD-10-CM

## 2022-08-31 DIAGNOSIS — E11.65 TYPE II DIABETES MELLITUS WITH HYPEROSMOLARITY, UNCONTROLLED (HCC): ICD-10-CM

## 2022-08-31 DIAGNOSIS — K21.9 GASTROESOPHAGEAL REFLUX DISEASE WITHOUT ESOPHAGITIS: ICD-10-CM

## 2022-08-31 DIAGNOSIS — Z79.4 TYPE 2 DIABETES MELLITUS WITH HYPERGLYCEMIA, WITH LONG-TERM CURRENT USE OF INSULIN (HCC): ICD-10-CM

## 2022-08-31 DIAGNOSIS — E11.65 TYPE 2 DIABETES MELLITUS WITH HYPERGLYCEMIA, WITH LONG-TERM CURRENT USE OF INSULIN (HCC): ICD-10-CM

## 2022-08-31 RX ORDER — BLOOD-GLUCOSE METER
EACH MISCELLANEOUS 4 TIMES DAILY
Qty: 1 KIT | Refills: 0 | Status: SHIPPED | OUTPATIENT
Start: 2022-08-31

## 2022-08-31 RX ORDER — INSULIN DEGLUDEC 200 U/ML
20 INJECTION, SOLUTION SUBCUTANEOUS DAILY
Qty: 9 ML | Refills: 1 | Status: SHIPPED | OUTPATIENT
Start: 2022-08-31

## 2022-08-31 RX ORDER — LANCETS 33 GAUGE
EACH MISCELLANEOUS 4 TIMES DAILY
Qty: 300 EACH | Refills: 2 | Status: SHIPPED | OUTPATIENT
Start: 2022-08-31

## 2022-08-31 RX ORDER — BLOOD SUGAR DIAGNOSTIC
STRIP MISCELLANEOUS
Qty: 300 EACH | Refills: 2 | Status: SHIPPED | OUTPATIENT
Start: 2022-08-31

## 2022-08-31 RX ORDER — PEN NEEDLE, DIABETIC 32GX 5/32"
NEEDLE, DISPOSABLE MISCELLANEOUS DAILY
Qty: 30 EACH | Refills: 11 | Status: SHIPPED | OUTPATIENT
Start: 2022-08-31

## 2022-10-12 PROBLEM — S91.112A LACERATION OF LEFT GREAT TOE WITHOUT FOREIGN BODY PRESENT OR DAMAGE TO NAIL: Status: RESOLVED | Noted: 2021-08-19 | Resolved: 2022-10-12

## 2022-10-12 PROBLEM — J06.9 VIRAL URI: Status: RESOLVED | Noted: 2021-11-26 | Resolved: 2022-10-12

## 2022-11-04 ENCOUNTER — VBI (OUTPATIENT)
Dept: ADMINISTRATIVE | Facility: OTHER | Age: 57
End: 2022-11-04

## 2022-11-04 NOTE — TELEPHONE ENCOUNTER
11/04/22 8:31 AM     See documentation in the  [employer] SmartForm       [facility]

## 2022-11-07 ENCOUNTER — VBI (OUTPATIENT)
Dept: ADMINISTRATIVE | Facility: OTHER | Age: 57
End: 2022-11-07

## 2022-11-08 ENCOUNTER — OFFICE VISIT (OUTPATIENT)
Dept: FAMILY MEDICINE CLINIC | Facility: CLINIC | Age: 57
End: 2022-11-08

## 2022-11-08 VITALS
BODY MASS INDEX: 22.54 KG/M2 | RESPIRATION RATE: 18 BRPM | HEART RATE: 57 BPM | SYSTOLIC BLOOD PRESSURE: 110 MMHG | TEMPERATURE: 96.4 F | DIASTOLIC BLOOD PRESSURE: 60 MMHG | HEIGHT: 63 IN | OXYGEN SATURATION: 100 % | WEIGHT: 127.2 LBS

## 2022-11-08 DIAGNOSIS — M05.9 RHEUMATOID ARTHRITIS WITH POSITIVE RHEUMATOID FACTOR, INVOLVING UNSPECIFIED SITE (HCC): ICD-10-CM

## 2022-11-08 DIAGNOSIS — E11.65 TYPE II DIABETES MELLITUS WITH HYPEROSMOLARITY, UNCONTROLLED (HCC): ICD-10-CM

## 2022-11-08 DIAGNOSIS — E78.2 MIXED HYPERLIPIDEMIA: ICD-10-CM

## 2022-11-08 DIAGNOSIS — Z00.00 ANNUAL PHYSICAL EXAM: ICD-10-CM

## 2022-11-08 DIAGNOSIS — E11.00 TYPE II DIABETES MELLITUS WITH HYPEROSMOLARITY, UNCONTROLLED (HCC): ICD-10-CM

## 2022-11-08 DIAGNOSIS — I10 ESSENTIAL HYPERTENSION: ICD-10-CM

## 2022-11-08 DIAGNOSIS — E11.65 UNCONTROLLED TYPE 2 DIABETES MELLITUS WITH HYPERGLYCEMIA (HCC): Primary | ICD-10-CM

## 2022-11-08 DIAGNOSIS — M79.674 TOE PAIN, RIGHT: ICD-10-CM

## 2022-11-08 DIAGNOSIS — K21.9 GASTROESOPHAGEAL REFLUX DISEASE WITHOUT ESOPHAGITIS: ICD-10-CM

## 2022-11-08 DIAGNOSIS — I10 PRIMARY HYPERTENSION: ICD-10-CM

## 2022-11-08 RX ORDER — INSULIN DEGLUDEC 200 U/ML
INJECTION, SOLUTION SUBCUTANEOUS
Qty: 12 ML | Refills: 3 | Status: SHIPPED | OUTPATIENT
Start: 2022-11-08 | End: 2022-11-08 | Stop reason: SDUPTHER

## 2022-11-08 RX ORDER — INSULIN DEGLUDEC 200 U/ML
INJECTION, SOLUTION SUBCUTANEOUS
Qty: 12 ML | Refills: 3 | Status: SHIPPED | OUTPATIENT
Start: 2022-11-08

## 2022-11-08 RX ORDER — CHLORAL HYDRATE 500 MG
1000 CAPSULE ORAL DAILY
COMMUNITY

## 2022-11-08 NOTE — ASSESSMENT & PLAN NOTE
Lab Results   Component Value Date    HGBA1C 14 (A) 05/04/2022   Patient needs to monitor her blood sugar more closely and accurately and follow-up with diabetic counseling and endocrinology referral

## 2022-11-08 NOTE — ASSESSMENT & PLAN NOTE
Follow-up referral to Podiatry patient is worried about the discoloration of her great toenail on the right foot

## 2022-11-08 NOTE — ASSESSMENT & PLAN NOTE
Lab Results   Component Value Date    HGBA1C 14 (A) 05/04/2022   Follow-up closely monitoring laboratory work checking blood glucose regularly at home continuing on insulin scale manage with assistance through diabetic teaching and endocrinology

## 2022-11-08 NOTE — ASSESSMENT & PLAN NOTE
Hypertension under stable control no change in current treatment plan avoid sodium follow-up as scheduled at next office visit

## 2022-11-08 NOTE — PROGRESS NOTES
Assessment/Plan:       Problem List Items Addressed This Visit        Digestive    GERD (gastroesophageal reflux disease)     GERD symptoms stable currently continuing with omeprazole         Relevant Medications    insulin degludec Cynda Kwok FlexTouch) 200 units/mL CONCENTRATED U-200 injection pen       Endocrine    Uncontrolled type 2 diabetes mellitus with hyperglycemia ([facility] ) - Primary       Lab Results   Component Value Date    HGBA1C 14 (A) 05/04/2022   Follow-up closely monitoring laboratory work checking blood glucose regularly at home continuing on insulin scale manage with assistance through diabetic teaching and endocrinology         Relevant Medications    insulin degludec Cynda Kwok FlexTouch) 200 units/mL CONCENTRATED U-200 injection pen    Other Relevant Orders    CBC and differential    Comprehensive metabolic panel    Lipid panel    TSH, 3rd generation with Free T4 reflex    Hemoglobin A1C       Cardiovascular and Mediastinum    Essential hypertension     Hypertension under stable control no change in current treatment plan avoid sodium follow-up as scheduled at next office visit         Relevant Medications    insulin degludec Cynda Kwok FlexTouch) 200 units/mL CONCENTRATED U-200 injection pen       Musculoskeletal and Integument    RA (rheumatoid arthritis) ([facility] )     Follow-up with Rheumatology monitor laboratory work and x-rays as needed if symptoms change or worsen            Other    Annual physical exam    Mixed hyperlipidemia     Mixed hyperlipidemia needs to be monitored and followed repeat laboratory work and follow heart healthy diet avoiding saturated fats         Toe pain, right     Follow-up referral to Podiatry patient is worried about the discoloration of her great toenail on the right foot             Other Visit Diagnoses     Type II diabetes mellitus with hyperosmolarity, uncontrolled (HCC)        Relevant Medications    insulin degludec Cynda Kwok FlexTouch) 200 units/mL CONCENTRATED U-200 injection pen    Other Relevant Orders    Ambulatory Referral to Podiatry            Subjective:      Patient ID: Kylah Shelby is a 65 y o  female  Follow-up evaluation and questions regarding her diabetes and her general health concerns      The following portions of the patient's history were reviewed and updated as appropriate: allergies, current medications, past family history, past medical history, past social history, past surgical history and problem list     Review of Systems   Constitutional: Negative for chills, fatigue and fever  HENT: Negative for congestion, nosebleeds, rhinorrhea, sinus pressure and sore throat  Eyes: Negative for discharge and redness  Respiratory: Negative for cough and shortness of breath  Cardiovascular: Negative for chest pain, palpitations and leg swelling  Gastrointestinal: Negative for abdominal pain, blood in stool and nausea  Endocrine: Negative for cold intolerance, heat intolerance and polyuria  Genitourinary: Negative for dysuria and frequency  Musculoskeletal: Negative for arthralgias, back pain and myalgias  Skin: Negative for rash  Neurological: Negative for dizziness, weakness and headaches  Hematological: Negative for adenopathy  Psychiatric/Behavioral: Negative for behavioral problems and sleep disturbance  The patient is not nervous/anxious  Objective:      /60 (BP Location: Left arm, Patient Position: Sitting)   Pulse 57   Temp (!) 96 4 °F (35 8 °C)   Resp 18   Ht 5' 3" (1 6 m)   Wt 57 7 kg (127 lb 3 2 oz)   SpO2 100%   BMI 22 53 kg/m²        Physical Exam  Vitals and nursing note reviewed  Constitutional:       General: She is not in acute distress  Appearance: Normal appearance  She is well-developed and normal weight  HENT:      Head: Normocephalic and atraumatic        Right Ear: Tympanic membrane, ear canal and external ear normal       Left Ear: Tympanic membrane, ear canal and external ear normal  Nose: Nose normal       Mouth/Throat:      Mouth: Mucous membranes are moist       Pharynx: Oropharynx is clear  No oropharyngeal exudate  Eyes:      General: No scleral icterus  Right eye: No discharge  Left eye: No discharge  Extraocular Movements: Extraocular movements intact  Conjunctiva/sclera: Conjunctivae normal       Pupils: Pupils are equal, round, and reactive to light  Neck:      Thyroid: No thyromegaly  Vascular: No JVD  Cardiovascular:      Rate and Rhythm: Normal rate and regular rhythm  Pulses: Normal pulses  Heart sounds: Normal heart sounds  No murmur heard  Pulmonary:      Effort: Pulmonary effort is normal       Breath sounds: No wheezing or rales  Chest:      Chest wall: No tenderness  Abdominal:      General: Bowel sounds are normal  There is no distension  Palpations: Abdomen is soft  There is no mass  Tenderness: There is no abdominal tenderness  Musculoskeletal:         General: No tenderness or deformity  Normal range of motion  Cervical back: Normal range of motion  Lymphadenopathy:      Cervical: No cervical adenopathy  Skin:     General: Skin is warm and dry  Capillary Refill: Capillary refill takes less than 2 seconds  Findings: No rash  Neurological:      General: No focal deficit present  Mental Status: She is alert and oriented to person, place, and time  Mental status is at baseline  Cranial Nerves: No cranial nerve deficit  Coordination: Coordination normal       Deep Tendon Reflexes: Reflexes are normal and symmetric  Reflexes normal    Psychiatric:         Mood and Affect: Mood normal          Behavior: Behavior normal          Thought Content:  Thought content normal          Judgment: Judgment normal           Data:    Laboratory Results: I have personally reviewed the pertinent laboratory results/reports   Radiology/Other Diagnostic Testing Results: I have personally reviewed pertinent reports         Lab Results   Component Value Date    WBC 5 74 12/16/2021    HGB 14 2 12/16/2021    HCT 43 8 12/16/2021    MCV 88 12/16/2021     12/16/2021     Lab Results   Component Value Date     05/13/2015    K 4 0 12/16/2021     12/16/2021    CO2 23 12/16/2021    ANIONGAP 5 05/13/2015    BUN 15 12/16/2021    CREATININE 0 78 12/16/2021    GLUCOSE 194 (H) 05/13/2015    GLUF 233 (H) 08/20/2021    CALCIUM 9 5 12/16/2021    CORRECTEDCA 8 8 11/12/2021    AST 17 12/16/2021    ALT 37 12/16/2021    ALKPHOS 126 (H) 12/16/2021    PROT 7 8 05/13/2015    BILITOT 0 3 05/13/2015    EGFR 85 12/16/2021     Lab Results   Component Value Date    CHOLESTEROL 241 (H) 08/20/2021    CHOLESTEROL 217 (H) 03/13/2021    CHOLESTEROL 203 (H) 05/13/2019     Lab Results   Component Value Date    HDL 56 08/20/2021    HDL 58 03/13/2021    HDL 54 05/13/2019     Lab Results   Component Value Date    LDLCALC 157 (H) 08/20/2021    LDLCALC 131 (H) 03/13/2021    LDLCALC 107 (H) 05/13/2019     Lab Results   Component Value Date    TRIG 142 08/20/2021    TRIG 139 03/13/2021    TRIG 211 (H) 05/13/2019     No results found for: [place], [place]  Lab Results   Component Value Date    [id number] 1 590 08/20/2021     Lab Results   Component Value Date    HGBA1C 14 (A) 05/04/2022     No results found for: SATINDER Witt

## 2022-11-08 NOTE — ASSESSMENT & PLAN NOTE
Follow-up with Rheumatology monitor laboratory work and x-rays as needed if symptoms change or worsen

## 2022-11-08 NOTE — ASSESSMENT & PLAN NOTE
Mixed hyperlipidemia needs to be monitored and followed repeat laboratory work and follow heart healthy diet avoiding saturated fats

## 2022-11-16 ENCOUNTER — TELEPHONE (OUTPATIENT)
Dept: FAMILY MEDICINE CLINIC | Facility: CLINIC | Age: 57
End: 2022-11-16

## 2022-11-16 DIAGNOSIS — E11.65 UNCONTROLLED TYPE 2 DIABETES MELLITUS WITH HYPERGLYCEMIA (HCC): Primary | ICD-10-CM

## 2022-11-16 NOTE — TELEPHONE ENCOUNTER
Pt asking if you thought it would beneficial to go back on the metformin? If so she will need a new RX sent to the pharmacy

## 2022-11-29 DIAGNOSIS — Z79.4 TYPE 2 DIABETES MELLITUS WITH HYPERGLYCEMIA, WITH LONG-TERM CURRENT USE OF INSULIN (HCC): ICD-10-CM

## 2022-11-29 DIAGNOSIS — E11.65 TYPE 2 DIABETES MELLITUS WITH HYPERGLYCEMIA, WITH LONG-TERM CURRENT USE OF INSULIN (HCC): ICD-10-CM

## 2022-11-29 RX ORDER — BLOOD-GLUCOSE METER
EACH MISCELLANEOUS 4 TIMES DAILY
Qty: 1 KIT | Refills: 0 | Status: SHIPPED | OUTPATIENT
Start: 2022-11-29

## 2022-12-08 ENCOUNTER — VBI (OUTPATIENT)
Dept: ADMINISTRATIVE | Facility: OTHER | Age: 57
End: 2022-12-08

## 2022-12-13 ENCOUNTER — OFFICE VISIT (OUTPATIENT)
Dept: FAMILY MEDICINE CLINIC | Facility: CLINIC | Age: 57
End: 2022-12-13

## 2022-12-13 VITALS
WEIGHT: 126.8 LBS | SYSTOLIC BLOOD PRESSURE: 102 MMHG | RESPIRATION RATE: 18 BRPM | DIASTOLIC BLOOD PRESSURE: 60 MMHG | OXYGEN SATURATION: 98 % | BODY MASS INDEX: 22.47 KG/M2 | HEART RATE: 93 BPM | TEMPERATURE: 98.3 F | HEIGHT: 63 IN

## 2022-12-13 DIAGNOSIS — K21.9 GASTROESOPHAGEAL REFLUX DISEASE WITHOUT ESOPHAGITIS: ICD-10-CM

## 2022-12-13 DIAGNOSIS — E11.00 TYPE II DIABETES MELLITUS WITH HYPEROSMOLARITY, UNCONTROLLED (HCC): ICD-10-CM

## 2022-12-13 DIAGNOSIS — E11.65 TYPE II DIABETES MELLITUS WITH HYPEROSMOLARITY, UNCONTROLLED (HCC): ICD-10-CM

## 2022-12-13 DIAGNOSIS — I10 ESSENTIAL HYPERTENSION: ICD-10-CM

## 2022-12-13 DIAGNOSIS — M05.9 RHEUMATOID ARTHRITIS WITH POSITIVE RHEUMATOID FACTOR, INVOLVING UNSPECIFIED SITE (HCC): ICD-10-CM

## 2022-12-13 DIAGNOSIS — E11.65 UNCONTROLLED TYPE 2 DIABETES MELLITUS WITH HYPERGLYCEMIA (HCC): Primary | ICD-10-CM

## 2022-12-13 RX ORDER — INSULIN LISPRO 100 [IU]/ML
10 INJECTION, SOLUTION INTRAVENOUS; SUBCUTANEOUS
Qty: 30 ML | Refills: 5 | Status: SHIPPED | OUTPATIENT
Start: 2022-12-13

## 2022-12-13 NOTE — ASSESSMENT & PLAN NOTE
Lab Results   Component Value Date    HGBA1C 14 (A) 05/04/2022   The laboratory work as scheduled tomorrow and continue with insulin Genene Dewey and diet as instructed I provided an insulin scale for her to follow-up short acting insulin and explained again the need for Tresiba to be used as a once a day long-acting insulin not multiple times throughout the day

## 2022-12-13 NOTE — PROGRESS NOTES
Assessment/Plan:       Problem List Items Addressed This Visit        Digestive    GERD (gastroesophageal reflux disease)     Her symptoms are stable continue with omeprazole 20 mg daily            Endocrine    Uncontrolled type 2 diabetes mellitus with hyperglycemia ([facility] ) - Primary       Lab Results   Component Value Date    HGBA1C 14 (A) 05/04/2022   Presents for a follow-up evaluation today discussion regarding her diabetes after she spoke with the diabetic educator regarding diet and medication she is in difficulty with proper directions on her insulin usage  She is using metformin but only 1/2 tablet daily due to side effects from gastrointestinal upset if she takes the 1000 mg  Asked her to use the Mingo Corinne once daily as it is prescribed and use 25 units daily once daily  Additionally as she checks her blood sugar at different times throughout the day about 3 times daily she can use a short acting insulin and give herself insulin based on a scale that I will provide her  Scale: Sugar less than 150 0 units, sugar 151 to 200-5 units  Blood sugar 201 to 250-8 units  Blood sugar 251-to 300-10 units    Greater than 300-12 units         Relevant Medications    insulin lispro (HumaLOG KwikPen) 100 units/mL injection pen    Other Relevant Orders    Hemoglobin A1C    Comprehensive metabolic panel    Microalbumin / creatinine urine ratio    Lipid Panel with Direct LDL reflex    TSH, 3rd generation with Free T4 reflex    Type II diabetes mellitus with hyperosmolarity, uncontrolled ([facility])       Lab Results   Component Value Date    HGBA1C 14 (A) 05/04/2022   The laboratory work as scheduled tomorrow and continue with insulin Michell Andes and diet as instructed I provided an insulin scale for her to follow-up short acting insulin and explained again the need for Mingo Corinne to be used as a once a day long-acting insulin not multiple times throughout the day         Relevant Medications    insulin lispro (HumaLOG KwikPen) 100 units/mL injection pen       Cardiovascular and Mediastinum    Essential hypertension     Stable hypertension doing well off medication using diet and avoiding sodium            Musculoskeletal and Integument    RA (rheumatoid arthritis) ([address] )     Continue medications monitor follow-up with rheumatology if symptoms change or worsen              Subjective:      Patient ID: Ross Nix is a 65 y o  female  Evaluation today regarding diabetes and instructions for insulin usage done tomorrow      The following portions of the patient's history were reviewed and updated as appropriate: allergies, current medications, past family history, past medical history, past social history, past surgical history and problem list     Review of Systems   Constitutional: Negative for chills, fatigue and fever  HENT: Negative for congestion, nosebleeds, rhinorrhea, sinus pressure and sore throat  Eyes: Negative for discharge and redness  Respiratory: Negative for cough and shortness of breath  Cardiovascular: Negative for chest pain, palpitations and leg swelling  Gastrointestinal: Negative for abdominal pain, blood in stool and nausea  Endocrine: Negative for cold intolerance, heat intolerance and polyuria  Genitourinary: Negative for dysuria and frequency  Musculoskeletal: Negative for arthralgias, back pain and myalgias  Skin: Negative for rash  Neurological: Negative for dizziness, weakness and headaches  Hematological: Negative for adenopathy  Psychiatric/Behavioral: Negative for behavioral problems and sleep disturbance  The patient is not nervous/anxious  Objective:      /60 (BP Location: Left arm, Patient Position: Sitting, Cuff Size: Standard)   Pulse 93   Temp 98 3 °F (36 8 °C) (Tympanic)   Resp 18   Ht 5' 3" (1 6 m)   Wt 57 5 kg (126 lb 12 8 oz)   SpO2 98%   BMI 22 46 kg/m²        Physical Exam  Vitals and nursing note reviewed     Constitutional:       General: She is not in acute distress  Appearance: Normal appearance  She is well-developed and normal weight  HENT:      Head: Normocephalic and atraumatic  Right Ear: Tympanic membrane and external ear normal       Left Ear: Tympanic membrane, ear canal and external ear normal       Nose: Nose normal       Mouth/Throat:      Mouth: Mucous membranes are moist       Pharynx: Oropharynx is clear  No oropharyngeal exudate  Eyes:      General: No scleral icterus  Right eye: No discharge  Left eye: No discharge  Extraocular Movements: Extraocular movements intact  Conjunctiva/sclera: Conjunctivae normal       Pupils: Pupils are equal, round, and reactive to light  Neck:      Thyroid: No thyromegaly  Vascular: No JVD  Cardiovascular:      Rate and Rhythm: Normal rate and regular rhythm  Pulses: Normal pulses  Heart sounds: Normal heart sounds  No murmur heard  Pulmonary:      Effort: Pulmonary effort is normal       Breath sounds: Normal breath sounds  No wheezing or rales  Chest:      Chest wall: No tenderness  Abdominal:      General: Bowel sounds are normal  There is no distension  Palpations: Abdomen is soft  There is no mass  Tenderness: There is no abdominal tenderness  Musculoskeletal:         General: No tenderness or deformity  Normal range of motion  Cervical back: Normal range of motion  Lymphadenopathy:      Cervical: No cervical adenopathy  Skin:     General: Skin is warm and dry  Capillary Refill: Capillary refill takes less than 2 seconds  Findings: No rash  Neurological:      General: No focal deficit present  Mental Status: She is alert and oriented to person, place, and time  Mental status is at baseline  Cranial Nerves: No cranial nerve deficit  Coordination: Coordination normal       Deep Tendon Reflexes: Reflexes are normal and symmetric   Reflexes normal    Psychiatric:         Mood and Affect: Mood normal  Behavior: Behavior normal          Thought Content: Thought content normal          Judgment: Judgment normal           Data:    Laboratory Results: I have personally reviewed the pertinent laboratory results/reports   Radiology/Other Diagnostic Testing Results: I have personally reviewed pertinent reports         Lab Results   Component Value Date    WBC 5 74 12/16/2021    HGB 14 2 12/16/2021    HCT 43 8 12/16/2021    MCV 88 12/16/2021     12/16/2021     Lab Results   Component Value Date     05/13/2015    K 4 0 12/16/2021     12/16/2021    CO2 23 12/16/2021    ANIONGAP 5 05/13/2015    BUN 15 12/16/2021    CREATININE 0 78 12/16/2021    GLUCOSE 194 (H) 05/13/2015    GLUF 233 (H) 08/20/2021    CALCIUM 9 5 12/16/2021    CORRECTEDCA 8 8 11/12/2021    AST 17 12/16/2021    ALT 37 12/16/2021    ALKPHOS 126 (H) 12/16/2021    PROT 7 8 05/13/2015    BILITOT 0 3 05/13/2015    EGFR 85 12/16/2021     Lab Results   Component Value Date    CHOLESTEROL 241 (H) 08/20/2021    CHOLESTEROL 217 (H) 03/13/2021    CHOLESTEROL 203 (H) 05/13/2019     Lab Results   Component Value Date    HDL 56 08/20/2021    HDL 58 03/13/2021    HDL 54 05/13/2019     Lab Results   Component Value Date    LDLCALC 157 (H) 08/20/2021    LDLCALC 131 (H) 03/13/2021    LDLCALC 107 (H) 05/13/2019     Lab Results   Component Value Date    TRIG 142 08/20/2021    TRIG 139 03/13/2021    TRIG 211 (H) 05/13/2019     No results found for: [place], [place]  Lab Results   Component Value Date    [id number] 1 590 08/20/2021     Lab Results   Component Value Date    HGBA1C 14 (A) 05/04/2022     No results found for: SATINDER Aguayo, DO

## 2022-12-13 NOTE — ASSESSMENT & PLAN NOTE
Lab Results   Component Value Date    HGBA1C 14 (A) 05/04/2022   Presents for a follow-up evaluation today discussion regarding her diabetes after she spoke with the diabetic educator regarding diet and medication she is in difficulty with proper directions on her insulin usage  She is using metformin but only 1/2 tablet daily due to side effects from gastrointestinal upset if she takes the 1000 mg  Asked her to use the [place] once daily as it is prescribed and use 25 units daily once daily  Additionally as she checks her blood sugar at different times throughout the day about 3 times daily she can use a short acting insulin and give herself insulin based on a scale that I will provide her  Scale: Sugar less than 150 0 units, sugar 151 to 200-5 units  Blood sugar 201 to 250-8 units  Blood sugar 251-to 300-10 units    Greater than 300-12 units

## 2022-12-14 ENCOUNTER — APPOINTMENT (OUTPATIENT)
Dept: LAB | Facility: CLINIC | Age: 57
End: 2022-12-14

## 2022-12-14 ENCOUNTER — TELEPHONE (OUTPATIENT)
Dept: FAMILY MEDICINE CLINIC | Facility: CLINIC | Age: 57
End: 2022-12-14

## 2022-12-14 ENCOUNTER — OFFICE VISIT (OUTPATIENT)
Dept: PODIATRY | Facility: CLINIC | Age: 57
End: 2022-12-14

## 2022-12-14 VITALS — BODY MASS INDEX: 22.32 KG/M2 | WEIGHT: 126 LBS | HEIGHT: 63 IN

## 2022-12-14 DIAGNOSIS — K21.9 GASTROESOPHAGEAL REFLUX DISEASE WITHOUT ESOPHAGITIS: ICD-10-CM

## 2022-12-14 DIAGNOSIS — E11.00 TYPE II DIABETES MELLITUS WITH HYPEROSMOLARITY, UNCONTROLLED (HCC): ICD-10-CM

## 2022-12-14 DIAGNOSIS — E11.65 TYPE II DIABETES MELLITUS WITH HYPEROSMOLARITY, UNCONTROLLED (HCC): ICD-10-CM

## 2022-12-14 DIAGNOSIS — E11.65 UNCONTROLLED TYPE 2 DIABETES MELLITUS WITH HYPERGLYCEMIA (HCC): ICD-10-CM

## 2022-12-14 DIAGNOSIS — B37.81 THRUSH OF MOUTH AND ESOPHAGUS (HCC): ICD-10-CM

## 2022-12-14 DIAGNOSIS — E11.65 UNCONTROLLED TYPE 2 DIABETES MELLITUS WITH HYPERGLYCEMIA (HCC): Primary | ICD-10-CM

## 2022-12-14 DIAGNOSIS — IMO0002 UNCONTROLLED TYPE 2 DIABETES MELLITUS, WITHOUT LONG-TERM CURRENT USE OF INSULIN: ICD-10-CM

## 2022-12-14 DIAGNOSIS — G93.32 POST-COVID CHRONIC FATIGUE: ICD-10-CM

## 2022-12-14 DIAGNOSIS — U09.9 POST-COVID CHRONIC FATIGUE: ICD-10-CM

## 2022-12-14 DIAGNOSIS — S60.10XA SUBUNGUAL HEMATOMA OF DIGIT OF HAND, INITIAL ENCOUNTER: Primary | ICD-10-CM

## 2022-12-14 DIAGNOSIS — B37.0 THRUSH OF MOUTH AND ESOPHAGUS (HCC): ICD-10-CM

## 2022-12-14 LAB
ALBUMIN SERPL BCP-MCNC: 3.7 G/DL (ref 3.5–5)
ALP SERPL-CCNC: 121 U/L (ref 46–116)
ALT SERPL W P-5'-P-CCNC: 27 U/L (ref 12–78)
ANION GAP SERPL CALCULATED.3IONS-SCNC: 6 MMOL/L (ref 4–13)
AST SERPL W P-5'-P-CCNC: 13 U/L (ref 5–45)
BASOPHILS # BLD AUTO: 0.02 THOUSANDS/ÂΜL (ref 0–0.1)
BASOPHILS NFR BLD AUTO: 0 % (ref 0–1)
BILIRUB SERPL-MCNC: 0.25 MG/DL (ref 0.2–1)
BUN SERPL-MCNC: 17 MG/DL (ref 5–25)
CALCIUM SERPL-MCNC: 9 MG/DL (ref 8.3–10.1)
CHLORIDE SERPL-SCNC: 104 MMOL/L (ref 96–108)
CHOLEST SERPL-MCNC: 186 MG/DL
CO2 SERPL-SCNC: 27 MMOL/L (ref 21–32)
CREAT SERPL-MCNC: 0.55 MG/DL (ref 0.6–1.3)
CREAT UR-MCNC: 80.3 MG/DL
EOSINOPHIL # BLD AUTO: 0.15 THOUSAND/ÂΜL (ref 0–0.61)
EOSINOPHIL NFR BLD AUTO: 2 % (ref 0–6)
ERYTHROCYTE [DISTWIDTH] IN BLOOD BY AUTOMATED COUNT: 11.9 % (ref 11.6–15.1)
EST. AVERAGE GLUCOSE BLD GHB EST-MCNC: 255 MG/DL
GFR SERPL CREATININE-BSD FRML MDRD: 105 ML/MIN/1.73SQ M
GLUCOSE P FAST SERPL-MCNC: 222 MG/DL (ref 65–99)
HBA1C MFR BLD: 10.5 %
HCT VFR BLD AUTO: 43.7 % (ref 34.8–46.1)
HDLC SERPL-MCNC: 49 MG/DL
HGB BLD-MCNC: 14.6 G/DL (ref 11.5–15.4)
IMM GRANULOCYTES # BLD AUTO: 0.01 THOUSAND/UL (ref 0–0.2)
IMM GRANULOCYTES NFR BLD AUTO: 0 % (ref 0–2)
LYMPHOCYTES # BLD AUTO: 3.95 THOUSANDS/ÂΜL (ref 0.6–4.47)
LYMPHOCYTES NFR BLD AUTO: 63 % (ref 14–44)
MCH RBC QN AUTO: 28.9 PG (ref 26.8–34.3)
MCHC RBC AUTO-ENTMCNC: 33.4 G/DL (ref 31.4–37.4)
MCV RBC AUTO: 87 FL (ref 82–98)
MICROALBUMIN UR-MCNC: 11 MG/L (ref 0–20)
MICROALBUMIN/CREAT 24H UR: 14 MG/G CREATININE (ref 0–30)
MONOCYTES # BLD AUTO: 0.43 THOUSAND/ÂΜL (ref 0.17–1.22)
MONOCYTES NFR BLD AUTO: 7 % (ref 4–12)
NEUTROPHILS # BLD AUTO: 1.81 THOUSANDS/ÂΜL (ref 1.85–7.62)
NEUTS SEG NFR BLD AUTO: 28 % (ref 43–75)
NONHDLC SERPL-MCNC: 137 MG/DL
NRBC BLD AUTO-RTO: 0 /100 WBCS
PLATELET # BLD AUTO: 222 THOUSANDS/UL (ref 149–390)
PMV BLD AUTO: 10.4 FL (ref 8.9–12.7)
POTASSIUM SERPL-SCNC: 3.9 MMOL/L (ref 3.5–5.3)
PROT SERPL-MCNC: 7.4 G/DL (ref 6.4–8.4)
RBC # BLD AUTO: 5.05 MILLION/UL (ref 3.81–5.12)
SODIUM SERPL-SCNC: 137 MMOL/L (ref 135–147)
TRIGL SERPL-MCNC: 411 MG/DL
TSH SERPL DL<=0.05 MIU/L-ACNC: 1.09 UIU/ML (ref 0.45–4.5)
WBC # BLD AUTO: 6.37 THOUSAND/UL (ref 4.31–10.16)

## 2022-12-14 RX ORDER — INSULIN ASPART 100 [IU]/ML
10 INJECTION, SOLUTION INTRAVENOUS; SUBCUTANEOUS
Qty: 15 ML | Refills: 11 | Status: SHIPPED | OUTPATIENT
Start: 2022-12-14 | End: 2022-12-14 | Stop reason: SDUPTHER

## 2022-12-14 RX ORDER — INSULIN ASPART 100 [IU]/ML
10 INJECTION, SOLUTION INTRAVENOUS; SUBCUTANEOUS
Qty: 30 ML | Refills: 2 | Status: SHIPPED | OUTPATIENT
Start: 2022-12-14

## 2022-12-14 NOTE — PROGRESS NOTES
Diabetic Foot Exam    Patient's shoes and socks removed  Right Foot/Ankle   Right Foot Inspection  Skin Exam: skin normal and skin intact  No dry skin, no warmth, no callus, no erythema, no maceration, no abnormal color, no pre-ulcer, no ulcer and no callus  Sensory   Vibration: intact  Proprioception: intact  Monofilament testing: intact    Vascular  Capillary refills: < 3 seconds  The right DP pulse is 2+  The right PT pulse is 2+  Left Foot/Ankle  Left Foot Inspection  Skin Exam: skin normal and skin intact  No dry skin, no warmth, no erythema, no maceration, normal color, no pre-ulcer, no ulcer and no callus  Sensory   Vibration: intact  Proprioception: intact  Monofilament testing: intact    Vascular  Capillary refills: < 3 seconds  The left DP pulse is 2+  The left PT pulse is 2+  Assign Risk Category  No deformity present  No loss of protective sensation  No weak pulses  Risk: 0             PATIENT:  Edgard Ellsworth    1965    ASSESSMENT:     1  Subungual hematoma of digit of hand, initial encounter        2  Type II diabetes mellitus with hyperosmolarity, uncontrolled ([address] )  Ambulatory Referral to Podiatry            PLAN:  1  Patient was counseled on the condition and diagnosis  2   Educated disease prevention and risks related to diabetes  3   Educated proper daily foot care and exam   Instructed proper skin care / protection and footwear  Instructed to identify any signs of infection and related foot problem  4   The recent blood work was reviewed / discussed and the last HbA1c was 14  Discussed proper blood glucose control with diet and exercise  5   The patient will return in 12 months for diabetic foot exam       Imaging: I have personally reviewed pertinent films in PACS  Labs, pathology, and Other Studies: I have personally reviewed pertinent reports        • High risk  foot precautions reviewed with patient including the need to wear protective shoegear at all times when walking including in the home, the need to check feet all surfaces daily with a mirror and report and skin breaks to podiatry, the need to apply an emollient to skin of feet daily  • Diabetic Foot Ulcer Risks    - Between 10-15% of diabetic foot ulcers do not heal [v]  - Of diabetic foot ulcers that do not heal, 25% will require amputation  [v]    iv Adelso Burden , Delroy, V , Karlos Amado , and Marty CLARK , Foot Ulcers in the Diabetic Patient, Prevention and Treatment  Vascular Health Risk Management  2007 Feb; 3(1): 65-76  v ERNA Gamboa , YAA Hart , EDUADRO Cramer , ANA LILIA Cuenca , ERNA White T , Risk factors for lower extremity amputation in patients with diabetic foot ulcers: a hospital-based case-control study  Diabetic Foot & Ankle, 2015  6:10 3402    -Recommended much better control of her diabetes for relief of her painful feet, and advised no intervention on her subungual hematoma, she is to return in 1 year for further evaluation and management of her diabetic feet      Subjective:          Patient presents for evaluation management of her bilateral feet, she is complaining of numbness (bilateral feet, she has been diabetic for around 5 years, she has never been controlled her A1c is 14  She is curious about the issue on her right big toe, denies trauma, denies any issues, denies pain        The following portions of the patient's history were reviewed and updated as appropriate: allergies, current medications, past family history, past medical history, past social history, past surgical history and problem list   All pertinent labs and images were reviewed      Past Medical History  Past Medical History:   Diagnosis Date   • Arthritis    • Colonic polyp     resolved: 08/25/2017   • Diabetes mellitus ([facility] [address] )    • Dry eye syndrome of bilateral lacrimal glands     last assessed: 05/15/2015   • GERD (gastroesophageal reflux disease)    • History of screening mammography     last assessed: 07/25/2016   • Hypertension    • Lyme disease    • Osteoarthritis     last assessed: 07/25/2016   • RA (rheumatoid arthritis) (HCC)    • Rheumatoid arteritis (HCC)    • Rheumatoid arthritis (HCC)     osteoarthritis   • Stroke [facility])    • Symptomatic hypotension     last assessed: 03/22/2017   • Ventral hernia with obstruction and without gangrene     last assessed: 02/16/2017   • Vertigo        Past Surgical History  Past Surgical History:   Procedure Laterality Date   • BACK SURGERY     • CYST REMOVAL     • EGD AND COLONOSCOPY N/A 2/3/2017    Procedure: COLONOSCOPY; POSSIBLE POLYPECTOMY; POSSIBLE BIOPSY AND EGD ;  Surgeon: Palmira Fowler MD;  Location: [facility];   Service:    • HERNIA REPAIR     • LIPOMA RESECTION     • OTHER SURGICAL HISTORY      excision of sebaceous cst to back   • TUBAL LIGATION     • VENTRAL HERNIA REPAIR N/A 3/17/2017    Procedure: LAPAROSCOPIC VENTRAL HERNIA REPAIR WITH MESH ;  Surgeon: Palmira Fowler MD;  Location: [facility];  Service:         Allergies:  Penicillins    Medications:  Current Outpatient Medications   Medication Sig Dispense Refill   • ascorbic acid (VITAMIN C) 500 mg tablet Take 1 tablet (500 mg total) by mouth daily 60 tablet 5   • aspirin (ECOTRIN LOW STRENGTH) 81 mg EC tablet Take 81 mg by mouth daily     • Biotin w/ Vitamins C & E (HAIR/SKIN/NAILS PO) Take 2 capsules by mouth in the morning     • Blood Glucose Monitoring Suppl (ONE TOUCH ULTRA 2) w/Device KIT Use 4 (four) times a day 1 kit 0   • calcium carbonate (TUMS) 500 mg chewable tablet Chew 1 tablet daily     • cyanocobalamin (VITAMIN B-12) 500 MCG tablet Take 500 mcg by mouth daily     • dimenhyDRINATE (DRAMAMINE) 50 mg tablet Take 50 mg by mouth 2 (two) times a day       • docusate sodium (COLACE) 100 mg capsule Take 100 mg by mouth 2 (two) times a day as needed       • glucose blood (OneTouch Ultra) test strip Test blood sugar 3 to 4 times daily 300 each 2   • hydrocortisone (ANUSOL-HC) 2 5 % rectal cream Apply topically 2 (two) times a day (Patient taking differently: Apply topically 2 (two) times a day As needed) 28 g 0   • insulin aspart (NovoLOG FlexPen) 100 UNIT/ML injection pen Inject 10 Units under the skin 3 (three) times a day with meals 30 mL 2   • insulin degludec Dois Minors FlexTouch) 200 units/mL CONCENTRATED U-200 injection pen Inject 10 units at 11:00 a m  And 15 units at evening meal 12 mL 3   • insulin lispro (HumaLOG KwikPen) 100 units/mL injection pen Inject 10 Units under the skin 3 (three) times a day with meals 30 mL 5   • Insulin Pen Needle (BD Pen Needle Ignacia 2nd Gen) 32G X 4 MM MISC Inject under the skin daily 30 each 11   • metFORMIN (GLUCOPHAGE) 1000 MG tablet Take 1 tablet (1,000 mg total) by mouth 2 (two) times a day with meals 60 tablet 1   • methylcellulose (CITRUCEL) 500 mg tablet Take 1,000 mg by mouth daily       • Multiple Vitamin (MULTI-VITAMIN DAILY PO) Take by mouth     • Omega-3 Fatty Acids (fish oil) 1,000 mg Take 1,000 mg by mouth daily     • omeprazole (PriLOSEC) 20 mg delayed release capsule Take 1 capsule (20 mg total) by mouth daily before breakfast 30 capsule 5   • OneTouch Delica Lancets 04I MISC Use 4 (four) times a day Test blood sugar 3 to 4 times daily 300 each 2     No current facility-administered medications for this visit         Social History:  Social History     Socioeconomic History   • Marital status: /Civil Union     Spouse name: None   • Number of children: None   • Years of education: None   • Highest education level: None   Occupational History   • None   Tobacco Use   • Smoking status: Former     Packs/day: 0 25     Years: 2 00     Pack years: 0 50     Types: Cigarettes     Start date:      Quit date:      Years since quittin 9   • Smokeless tobacco: Never   Vaping Use   • Vaping Use: Never used   Substance and Sexual Activity   • Alcohol use: Not Currently     Comment: Stopped drinking alcohol    • Drug use: No   • Sexual activity: None   Other Topics Concern   • None   Social History Narrative    Daily coffee consumption    Daily tea consumption     Uses seat belts      Social Determinants of Health     Financial Resource Strain: Not on file   Food Insecurity: Not on file   Transportation Needs: Not on file   Physical Activity: Not on file   Stress: Not on file   Social Connections: Not on file   Intimate Partner Violence: Not on file   Housing Stability: Not on file        Review of Systems   Constitutional: Negative for chills and fever  HENT: Negative for ear pain and sore throat  Eyes: Negative for pain and visual disturbance  Respiratory: Negative for cough and shortness of breath  Cardiovascular: Negative for chest pain and palpitations  Gastrointestinal: Negative for abdominal pain and vomiting  Genitourinary: Negative for dysuria and hematuria  Musculoskeletal: Negative for arthralgias and back pain  Skin: Negative for color change and rash  Neurological: Negative for seizures and syncope  All other systems reviewed and are negative  Objective:      Ht 5' 3" (1 6 m)   Wt 57 2 kg (126 lb)   BMI 22 32 kg/m²          Physical Exam  Vitals reviewed  Constitutional:       Appearance: Normal appearance  HENT:      Head: Normocephalic and atraumatic  Nose: Nose normal       Mouth/Throat:      Mouth: Mucous membranes are moist    Eyes:      Pupils: Pupils are equal, round, and reactive to light  Cardiovascular:      Pulses: no weak pulses          Dorsalis pedis pulses are 2+ on the right side and 2+ on the left side  Posterior tibial pulses are 2+ on the right side and 2+ on the left side  Pulmonary:      Effort: Pulmonary effort is normal    Feet:      Right foot:      Skin integrity: No ulcer, skin breakdown, erythema, warmth, callus or dry skin  Left foot:      Skin integrity: No ulcer, skin breakdown, erythema, warmth, callus or dry skin     Skin:     Capillary Refill: Capillary refill takes less than 2 seconds  Neurological:      General: No focal deficit present  Mental Status: She is alert and oriented to person, place, and time  Mental status is at baseline

## 2022-12-14 NOTE — LETTER
December 15, 2022     [facility], [address] [address]    Patient: Rosemarie Iraheta   YOB: 1965   Date of Visit: 12/14/2022       Dear Dr Man Malagon: Thank you for referring Karley Patel to me for evaluation  Below are my notes for this consultation  If you have questions, please do not hesitate to call me  I look forward to following your patient along with you  Sincerely,        Orlando Headley DPM        CC: No Recipients  Tru Hendricks  12/14/2022  2:57 PM  Signed  Diabetic Foot Exam    Patient's shoes and socks removed  Right Foot/Ankle   Right Foot Inspection  Skin Exam: skin normal and skin intact  No dry skin, no warmth, no callus, no erythema, no maceration, no abnormal color, no pre-ulcer, no ulcer and no callus  Sensory   Vibration: intact  Proprioception: intact  Monofilament testing: intact    Vascular  Capillary refills: < 3 seconds  The right DP pulse is 2+  The right PT pulse is 2+  Left Foot/Ankle  Left Foot Inspection  Skin Exam: skin normal and skin intact  No dry skin, no warmth, no erythema, no maceration, normal color, no pre-ulcer, no ulcer and no callus  Sensory   Vibration: intact  Proprioception: intact  Monofilament testing: intact    Vascular  Capillary refills: < 3 seconds  The left DP pulse is 2+  The left PT pulse is 2+  Assign Risk Category  No deformity present  No loss of protective sensation  No weak pulses  Risk: 0             PATIENT:  Rosemarie Iraheta    1965    ASSESSMENT:    1  Subungual hematoma of digit of hand, initial encounter        2  Type II diabetes mellitus with hyperosmolarity, uncontrolled ([facility] [address] )  Ambulatory Referral to Podiatry            PLAN:  1  Patient was counseled on the condition and diagnosis  2   Educated disease prevention and risks related to diabetes      3   Educated proper daily foot care and exam   Instructed proper skin care / protection and footwear  Instructed to identify any signs of infection and related foot problem  4   The recent blood work was reviewed / discussed and the last HbA1c was 14  Discussed proper blood glucose control with diet and exercise  5   The patient will return in 12 months for diabetic foot exam       Imaging: I have personally reviewed pertinent films in PACS  Labs, pathology, and Other Studies: I have personally reviewed pertinent reports  • High risk  foot precautions reviewed with patient including the need to wear protective shoegear at all times when walking including in the home, the need to check feet all surfaces daily with a mirror and report and skin breaks to podiatry, the need to apply an emollient to skin of feet daily  • Diabetic Foot Ulcer Risks    - Between 10-15% of diabetic foot ulcers do not heal [v]  - Of diabetic foot ulcers that do not heal, 25% will require amputation  [v]    iv Delroy Castro V , General Caruso and Marty CLARK , Foot Ulcers in the Diabetic Patient, Prevention and Treatment  Vascular Health Risk Management  2007 Feb; 3(1): 65-76  v ERNA Gamboa , YAA Coon , EDUARDO Cramer , ANA LILIA Cuenca , ERNA White T , Risk factors for lower extremity amputation in patients with diabetic foot ulcers: a hospital-based case-control study  Diabetic Foot & Ankle, 2015  6:10 3402    -Recommended much better control of her diabetes for relief of her painful feet, and advised no intervention on her subungual hematoma, she is to return in 1 year for further evaluation and management of her diabetic feet      Subjective:         Patient presents for evaluation management of her bilateral feet, she is complaining of numbness (bilateral feet, she has been diabetic for around 5 years, she has never been controlled her A1c is 14    She is curious about the issue on her right big toe, denies trauma, denies any issues, denies pain        The following portions of the patient's history were reviewed and updated as appropriate: allergies, current medications, past family history, past medical history, past social history, past surgical history and problem list   All pertinent labs and images were reviewed  Past Medical History  Past Medical History:   Diagnosis Date   • Arthritis    • Colonic polyp     resolved: 08/25/2017   • Diabetes mellitus ([facility] [address] )    • Dry eye syndrome of bilateral lacrimal glands     last assessed: 05/15/2015   • GERD (gastroesophageal reflux disease)    • History of screening mammography     last assessed: 07/25/2016   • Hypertension    • Lyme disease    • Osteoarthritis     last assessed: 07/25/2016   • RA (rheumatoid arthritis) ([facility] [address] )    • Rheumatoid arteritis ([facility] [address] )    • Rheumatoid arthritis ([facility] [address] )     osteoarthritis   • [facility] )    • Symptomatic hypotension     last assessed: 03/22/2017   • Ventral hernia with obstruction and without gangrene     last assessed: 02/16/2017   • Vertigo        Past Surgical History  Past Surgical History:   Procedure Laterality Date   • BACK SURGERY     • CYST REMOVAL     • EGD AND COLONOSCOPY N/A 2/3/2017    Procedure: COLONOSCOPY; POSSIBLE POLYPECTOMY; POSSIBLE BIOPSY AND EGD ;  Surgeon: La Oliver MD;  Location: [facility];   Service:    • HERNIA REPAIR     • LIPOMA RESECTION     • OTHER SURGICAL HISTORY      excision of sebaceous cst to back   • TUBAL LIGATION     • VENTRAL HERNIA REPAIR N/A 3/17/2017    Procedure: LAPAROSCOPIC VENTRAL HERNIA REPAIR WITH MESH ;  Surgeon: La Oliver MD;  Location: [facility];  Service:         Allergies:  Penicillins    Medications:  Current Outpatient Medications   Medication Sig Dispense Refill   • ascorbic acid (VITAMIN C) 500 mg tablet Take 1 tablet (500 mg total) by mouth daily 60 tablet 5   • aspirin (ECOTRIN LOW STRENGTH) 81 mg EC tablet Take 81 mg by mouth daily     • Biotin w/ Vitamins C & E (HAIR/SKIN/NAILS PO) Take 2 capsules by mouth in the morning     • Blood Glucose Monitoring Suppl (ONE TOUCH ULTRA 2) w/Device KIT Use 4 (four) times a day 1 kit 0   • calcium carbonate (TUMS) 500 mg chewable tablet Chew 1 tablet daily     • cyanocobalamin (VITAMIN B-12) 500 MCG tablet Take 500 mcg by mouth daily     • dimenhyDRINATE (DRAMAMINE) 50 mg tablet Take 50 mg by mouth 2 (two) times a day       • docusate sodium (COLACE) 100 mg capsule Take 100 mg by mouth 2 (two) times a day as needed       • glucose blood (OneTouch Ultra) test strip Test blood sugar 3 to 4 times daily 300 each 2   • hydrocortisone (ANUSOL-HC) 2 5 % rectal cream Apply topically 2 (two) times a day (Patient taking differently: Apply topically 2 (two) times a day As needed) 28 g 0   • insulin aspart (NovoLOG FlexPen) 100 UNIT/ML injection pen Inject 10 Units under the skin 3 (three) times a day with meals 30 mL 2   • insulin degludec Unique Edouardels FlexTouch) 200 units/mL CONCENTRATED U-200 injection pen Inject 10 units at 11:00 a m  And 15 units at evening meal 12 mL 3   • insulin lispro (HumaLOG KwikPen) 100 units/mL injection pen Inject 10 Units under the skin 3 (three) times a day with meals 30 mL 5   • Insulin Pen Needle (BD Pen Needle Ignacia 2nd Gen) 32G X 4 MM MISC Inject under the skin daily 30 each 11   • metFORMIN (GLUCOPHAGE) 1000 MG tablet Take 1 tablet (1,000 mg total) by mouth 2 (two) times a day with meals 60 tablet 1   • methylcellulose (CITRUCEL) 500 mg tablet Take 1,000 mg by mouth daily       • Multiple Vitamin (MULTI-VITAMIN DAILY PO) Take by mouth     • Omega-3 Fatty Acids (fish oil) 1,000 mg Take 1,000 mg by mouth daily     • omeprazole (PriLOSEC) 20 mg delayed release capsule Take 1 capsule (20 mg total) by mouth daily before breakfast 30 capsule 5   • OneTouch Delica Lancets 47F MISC Use 4 (four) times a day Test blood sugar 3 to 4 times daily 300 each 2     No current facility-administered medications for this visit         Social History:  Social History     Socioeconomic History   • Marital status: /Civil Union     Spouse name: None   • Number of children: None   • Years of education: None   • Highest education level: None   Occupational History   • None   Tobacco Use   • Smoking status: Former     Packs/day: 0 25     Years: 2 00     Pack years: 0 50     Types: Cigarettes     Start date:      Quit date:      Years since quittin 9   • Smokeless tobacco: Never   Vaping Use   • Vaping Use: Never used   Substance and Sexual Activity   • Alcohol use: Not Currently     Comment: Stopped drinking alcohol    • Drug use: No   • Sexual activity: None   Other Topics Concern   • None   Social History Narrative    Daily coffee consumption    Daily tea consumption     Uses seat belts      Social Determinants of Health     Financial Resource Strain: Not on file   Food Insecurity: Not on file   Transportation Needs: Not on file   Physical Activity: Not on file   Stress: Not on file   Social Connections: Not on file   Intimate Partner Violence: Not on file   Housing Stability: Not on file        Review of Systems   Constitutional: Negative for chills and fever  HENT: Negative for ear pain and sore throat  Eyes: Negative for pain and visual disturbance  Respiratory: Negative for cough and shortness of breath  Cardiovascular: Negative for chest pain and palpitations  Gastrointestinal: Negative for abdominal pain and vomiting  Genitourinary: Negative for dysuria and hematuria  Musculoskeletal: Negative for arthralgias and back pain  Skin: Negative for color change and rash  Neurological: Negative for seizures and syncope  All other systems reviewed and are negative  Objective:      Ht 5' 3" (1 6 m)   Wt 57 2 kg (126 lb)   BMI 22 32 kg/m²         Physical Exam  Vitals reviewed  Constitutional:       Appearance: Normal appearance  HENT:      Head: Normocephalic and atraumatic        Nose: Nose normal       Mouth/Throat:      Mouth: Mucous membranes are moist    Eyes:      Pupils: Pupils are equal, round, and reactive to light  Cardiovascular:      Pulses: no weak pulses          Dorsalis pedis pulses are 2+ on the right side and 2+ on the left side  Posterior tibial pulses are 2+ on the right side and 2+ on the left side  Pulmonary:      Effort: Pulmonary effort is normal    Feet:      Right foot:      Skin integrity: No ulcer, skin breakdown, erythema, warmth, callus or dry skin  Left foot:      Skin integrity: No ulcer, skin breakdown, erythema, warmth, callus or dry skin  Skin:     Capillary Refill: Capillary refill takes less than 2 seconds  Neurological:      General: No focal deficit present  Mental Status: She is alert and oriented to person, place, and time  Mental status is at baseline

## 2022-12-16 ENCOUNTER — TELEPHONE (OUTPATIENT)
Dept: FAMILY MEDICINE CLINIC | Facility: CLINIC | Age: 57
End: 2022-12-16

## 2022-12-16 NOTE — TELEPHONE ENCOUNTER
Patient called in for results on her labs   Patient also wanted to let you know she went to the podiatrist

## 2023-01-03 ENCOUNTER — VBI (OUTPATIENT)
Dept: ADMINISTRATIVE | Facility: OTHER | Age: 58
End: 2023-01-03

## 2023-01-05 ENCOUNTER — OFFICE VISIT (OUTPATIENT)
Dept: FAMILY MEDICINE CLINIC | Facility: CLINIC | Age: 58
End: 2023-01-05

## 2023-01-05 VITALS
HEIGHT: 63 IN | BODY MASS INDEX: 22.54 KG/M2 | DIASTOLIC BLOOD PRESSURE: 67 MMHG | SYSTOLIC BLOOD PRESSURE: 104 MMHG | TEMPERATURE: 97.2 F | WEIGHT: 127.2 LBS | HEART RATE: 70 BPM | RESPIRATION RATE: 16 BRPM | OXYGEN SATURATION: 94 %

## 2023-01-05 DIAGNOSIS — E11.00 TYPE II DIABETES MELLITUS WITH HYPEROSMOLARITY, UNCONTROLLED (HCC): ICD-10-CM

## 2023-01-05 DIAGNOSIS — E11.65 TYPE II DIABETES MELLITUS WITH HYPEROSMOLARITY, UNCONTROLLED (HCC): ICD-10-CM

## 2023-01-05 DIAGNOSIS — Z79.4 CURRENT USE OF INSULIN (HCC): ICD-10-CM

## 2023-01-05 DIAGNOSIS — Z12.31 VISIT FOR SCREENING MAMMOGRAM: ICD-10-CM

## 2023-01-05 DIAGNOSIS — M05.9 RHEUMATOID ARTHRITIS WITH POSITIVE RHEUMATOID FACTOR, INVOLVING UNSPECIFIED SITE (HCC): ICD-10-CM

## 2023-01-05 DIAGNOSIS — E11.65 UNCONTROLLED TYPE 2 DIABETES MELLITUS WITH HYPERGLYCEMIA (HCC): Primary | ICD-10-CM

## 2023-01-05 DIAGNOSIS — M54.2 NECK PAIN: ICD-10-CM

## 2023-01-05 RX ORDER — MELOXICAM 15 MG/1
15 TABLET ORAL DAILY
Qty: 30 TABLET | Refills: 1 | Status: SHIPPED | OUTPATIENT
Start: 2023-01-05

## 2023-01-05 RX ORDER — CHLORZOXAZONE 500 MG/1
500 TABLET ORAL 4 TIMES DAILY PRN
Qty: 20 TABLET | Refills: 0 | Status: SHIPPED | OUTPATIENT
Start: 2023-01-05

## 2023-01-05 NOTE — ASSESSMENT & PLAN NOTE
Instructed patient better on usage of insulin and short acting insulin following a scale    Repeat A1c at next scheduled visit

## 2023-01-05 NOTE — PATIENT INSTRUCTIONS
Neck Exercises   AMBULATORY CARE:   Neck exercises  help reduce neck pain, and improve neck movement and strength  Neck exercises also help prevent long-term neck problems  What you need to know about neck exercises:   Do the exercises every day,  or as often as directed by your healthcare provider  Move slowly, gently, and smoothly  Avoid fast or jerky motions  Stand and sit the way your healthcare provider shows you  Good posture may reduce your neck pain  Check your posture often, even when you are not doing your neck exercises  How to perform neck exercises safely:   Exercise position:  You may sit or stand while you do neck exercises  Face forward  Your shoulders should be straight and relaxed, with a good posture  Head tilts, forward and back:  Gently bow your head and try to touch your chin to your chest  Your healthcare provider may tell you to push on the back of your neck to help bow your head  Raise your chin back to the starting position  Tilt your head back as far as possible so you are looking up at the ceiling  Your healthcare provider may tell you to lift your chin to help tilt your head back  Return your head to the starting position  Head tilts, side to side:  Tilt your head, bringing your ear toward your shoulder  Then tilt your head toward the other shoulder  Head turns:  Turn your head to look over your shoulder  Tilt your chin down and try to touch it to your shoulder  Do not raise your shoulder to your chin  Face forward again  Do the same on the other side  Head rolls:  Slowly bring your chin toward your chest  Next, roll your head to the right  Your ear should be positioned over your shoulder  Hold this position for 5 seconds  Roll your head back toward your chest and to the left into the same position  Hold for 5 seconds  Gently roll your head back and around in a clockwise [place] 3 times   Next, move your head in the reverse direction (counterclockwise) in a [place] 3 times  Do not shrug your shoulders upwards while you do this exercise  Contact your healthcare provider if:   Your pain does not get better, or gets worse  You have questions or concerns about your condition, care, or exercise program     © Copyright [employer] 2022 Information is for End User's use only and may not be sold, redistributed or otherwise used for commercial purposes  All illustrations and images included in CareNotes® are the copyrighted property of [employer]  or Yane Li  The above information is an  only  It is not intended as medical advice for individual conditions or treatments  Talk to your doctor, nurse or pharmacist before following any medical regimen to see if it is safe and effective for you

## 2023-01-05 NOTE — ASSESSMENT & PLAN NOTE
Recent flareup of neck pain now will recommend home exercises and stretching additionally can go for physical therapy if not improving and I would like her to take a short course of nonsteroidal anti-inflammatory medication for 2 weeks

## 2023-01-05 NOTE — PROGRESS NOTES
Assessment/Plan:       Problem List Items Addressed This Visit        Endocrine    Uncontrolled type 2 diabetes mellitus with hyperglycemia ([facility] ) - Primary     Diabetes overall doing much better with patient following the insulin scale and directions provided to her she has a better understanding of this and hopefully her A1c will improve on her next office visit here  Lab Results   Component Value Date    HGBA1C 10 5 (H) 12/14/2022            Relevant Orders    Microalbumin / creatinine urine ratio    Type II diabetes mellitus with hyperosmolarity, uncontrolled (HCC)     Adjusting insulin dosing checking blood sugars and monitoring by insulin scale provided follow-up at next visit  Lab Results   Component Value Date    HGBA1C 10 5 (H) 12/14/2022               Musculoskeletal and Integument    RA (rheumatoid arthritis) ([facility] )     Recent flareup of neck pain now will recommend home exercises and stretching additionally can go for physical therapy if not improving and I would like her to take a short course of nonsteroidal anti-inflammatory medication for 2 weeks         Relevant Medications    meloxicam (Mobic) 15 mg tablet       Other    Current use of insulin ([facility] [address] )     Instructed patient better on usage of insulin and short acting insulin following a scale  Repeat A1c at next scheduled visit         Neck pain     Right-sided neck pain appears to be a spasm problem at this point she has tenderness to palpation over paravertebral muscles of see C3-6 with limited range of motion on rotation to the right at 45 degrees and sidebending is limited to 25 degrees with pain at extremes of motion  She has no radicular symptoms into her right upper extremity mild trapezius spasm and no shoulder pain  I recommend moist heat and gentle home stretching exercises and will provide her with a short course of anti-inflammatory medication with muscle relaxer    She will contact me if symptoms do not improve and will refer for physical therapy at that point         Relevant Medications    chlorzoxazone (PARAFON FORTE) 500 mg tablet    meloxicam (Mobic) 15 mg tablet   Other Visit Diagnoses     Visit for screening mammogram        Relevant Orders    Mammo screening bilateral w 3d & cad            Subjective:      Patient ID: Mahogany Almonte is a 65 y o  female  Patient presents for about 2 weeks of neck pain more right-sided than left denies a specific injury or fall  Pain is nonradiating    Neck Pain   Pertinent negatives include no chest pain, fever, headaches or weakness  The following portions of the patient's history were reviewed and updated as appropriate: allergies, current medications, past family history, past medical history, past social history, past surgical history and problem list     Review of Systems   Constitutional: Negative for chills, fatigue and fever  HENT: Negative for congestion, nosebleeds, rhinorrhea, sinus pressure and sore throat  Eyes: Negative for discharge and redness  Respiratory: Negative for cough and shortness of breath  Cardiovascular: Negative for chest pain, palpitations and leg swelling  Gastrointestinal: Negative for abdominal pain, blood in stool and nausea  Endocrine: Negative for cold intolerance, heat intolerance and polyuria  Genitourinary: Negative for dysuria and frequency  Musculoskeletal: Positive for neck pain  Negative for arthralgias, back pain and myalgias  Skin: Negative for rash  Neurological: Negative for dizziness, weakness and headaches  Hematological: Negative for adenopathy  Psychiatric/Behavioral: Negative for behavioral problems and sleep disturbance  The patient is not nervous/anxious            Objective:      /67 (BP Location: Left arm, Patient Position: Sitting, Cuff Size: Standard)   Pulse 70   Temp (!) 97 2 °F (36 2 °C)   Resp 16   Ht 5' 3" (1 6 m)   Wt 57 7 kg (127 lb 3 2 oz)   SpO2 94%   BMI 22 53 kg/m²        Physical Exam  Vitals and nursing note reviewed  Constitutional:       General: She is not in acute distress  Appearance: Normal appearance  She is well-developed  HENT:      Head: Normocephalic and atraumatic  Right Ear: Tympanic membrane and external ear normal       Left Ear: Tympanic membrane and external ear normal       Nose: Nose normal       Mouth/Throat:      Mouth: Mucous membranes are moist       Pharynx: Oropharynx is clear  No oropharyngeal exudate  Eyes:      General: No scleral icterus  Right eye: No discharge  Left eye: No discharge  Extraocular Movements: Extraocular movements intact  Conjunctiva/sclera: Conjunctivae normal       Pupils: Pupils are equal, round, and reactive to light  Neck:      Thyroid: No thyromegaly  Vascular: No JVD  Cardiovascular:      Rate and Rhythm: Normal rate and regular rhythm  Pulses: Normal pulses  Heart sounds: Normal heart sounds  No murmur heard  Pulmonary:      Effort: Pulmonary effort is normal       Breath sounds: No wheezing or rales  Chest:      Chest wall: No tenderness  Abdominal:      General: Bowel sounds are normal  There is no distension  Palpations: Abdomen is soft  There is no mass  Tenderness: There is no abdominal tenderness  Musculoskeletal:         General: No tenderness or deformity  Normal range of motion  Cervical back: Normal range of motion  Comments: Neck pain right-sided with limited range of motion from C3 through see 7 and trapezius spasm into right shoulder   Lymphadenopathy:      Cervical: No cervical adenopathy  Skin:     General: Skin is warm and dry  Capillary Refill: Capillary refill takes less than 2 seconds  Findings: No rash  Neurological:      General: No focal deficit present  Mental Status: She is alert and oriented to person, place, and time  Mental status is at baseline  Cranial Nerves: No cranial nerve deficit  Coordination: Coordination normal       Deep Tendon Reflexes: Reflexes are normal and symmetric  Reflexes normal    Psychiatric:         Mood and Affect: Mood normal          Behavior: Behavior normal          Thought Content: Thought content normal          Judgment: Judgment normal           Data:    Laboratory Results: I have personally reviewed the pertinent laboratory results/reports   Radiology/Other Diagnostic Testing Results: I have personally reviewed pertinent reports  Lab Results   Component Value Date    WBC 6 37 12/14/2022    HGB 14 6 12/14/2022    HCT 43 7 12/14/2022    MCV 87 12/14/2022     12/14/2022     Lab Results   Component Value Date     05/13/2015    K 3 9 12/14/2022     12/14/2022    CO2 27 12/14/2022    ANIONGAP 5 05/13/2015    BUN 17 12/14/2022    CREATININE 0 55 (L) 12/14/2022    GLUCOSE 194 (H) 05/13/2015    GLUF 222 (H) 12/14/2022    CALCIUM 9 0 12/14/2022    CORRECTEDCA 8 8 11/12/2021    AST 13 12/14/2022    ALT 27 12/14/2022    ALKPHOS 121 (H) 12/14/2022    PROT 7 8 05/13/2015    BILITOT 0 3 05/13/2015    EGFR 105 12/14/2022     Lab Results   Component Value Date    CHOLESTEROL 186 12/14/2022    CHOLESTEROL 241 (H) 08/20/2021    CHOLESTEROL 217 (H) 03/13/2021     Lab Results   Component Value Date    HDL 49 (L) 12/14/2022    HDL 56 08/20/2021    HDL 58 03/13/2021     Lab Results   Component Value Date    LDLCALC  12/14/2022      Comment:      Calculated LDL invalid, triglycerides >400 mg/dl  This screening LDL is a calculated result  It does not have the accuracy of the Direct Measured LDL in the monitoring of patients with hyperlipidemia and/or statin therapy  Direct Measure LDL (EHA043) must be ordered separately in these patients      LDLCALC 157 (H) 08/20/2021    LDLCALC 131 (H) 03/13/2021     Lab Results   Component Value Date    TRIG 411 (H) 12/14/2022    TRIG 142 08/20/2021    TRIG 139 03/13/2021     No results found for: [place], [place]  Lab Results Component Value Date    [id number] 1 090 12/14/2022     Lab Results   Component Value Date    HGBA1C 10 5 (H) 12/14/2022     No results found for: SATINDER Aguayo DO

## 2023-01-05 NOTE — ASSESSMENT & PLAN NOTE
Diabetes overall doing much better with patient following the insulin scale and directions provided to her she has a better understanding of this and hopefully her A1c will improve on her next office visit here  Lab Results   Component Value Date    HGBA1C 10 5 (H) 12/14/2022

## 2023-01-05 NOTE — ASSESSMENT & PLAN NOTE
Adjusting insulin dosing checking blood sugars and monitoring by insulin scale provided follow-up at next visit  Lab Results   Component Value Date    HGBA1C 10 5 (H) 12/14/2022

## 2023-01-05 NOTE — ASSESSMENT & PLAN NOTE
Right-sided neck pain appears to be a spasm problem at this point she has tenderness to palpation over paravertebral muscles of see C3-6 with limited range of motion on rotation to the right at 45 degrees and sidebending is limited to 25 degrees with pain at extremes of motion  She has no radicular symptoms into her right upper extremity mild trapezius spasm and no shoulder pain  I recommend moist heat and gentle home stretching exercises and will provide her with a short course of anti-inflammatory medication with muscle relaxer    She will contact me if symptoms do not improve and will refer for physical therapy at that point

## 2023-03-07 ENCOUNTER — RA CDI HCC (OUTPATIENT)
Dept: OTHER | Facility: HOSPITAL | Age: 58
End: 2023-03-07

## 2023-03-07 NOTE — PROGRESS NOTES
[facility]  coding opportunities       Chart reviewed, no opportunity found: CHART REVIEWED, NO OPPORTUNITY FOUND        Patients Insurance        Commercial Insurance: [address] [place] [address]

## 2023-03-15 ENCOUNTER — OFFICE VISIT (OUTPATIENT)
Dept: PODIATRY | Facility: CLINIC | Age: 58
End: 2023-03-15

## 2023-03-15 VITALS
DIASTOLIC BLOOD PRESSURE: 80 MMHG | HEART RATE: 66 BPM | WEIGHT: 123 LBS | BODY MASS INDEX: 21.79 KG/M2 | HEIGHT: 63 IN | SYSTOLIC BLOOD PRESSURE: 126 MMHG

## 2023-03-15 DIAGNOSIS — L60.4 BEAU'S LINE: Primary | ICD-10-CM

## 2023-03-15 DIAGNOSIS — L60.1 ONYCHOLYSIS: ICD-10-CM

## 2023-03-15 DIAGNOSIS — E11.65 TYPE II DIABETES MELLITUS WITH HYPEROSMOLARITY, UNCONTROLLED (HCC): ICD-10-CM

## 2023-03-15 DIAGNOSIS — E11.00 TYPE II DIABETES MELLITUS WITH HYPEROSMOLARITY, UNCONTROLLED (HCC): ICD-10-CM

## 2023-03-15 NOTE — PATIENT INSTRUCTIONS
Ingrown Nail   WHAT YOU NEED TO KNOW:   An ingrown nail is when the edge of your fingernail or toenail grows into the skin next to it  The most common cause is when nails are trimmed too short  DISCHARGE INSTRUCTIONS:   Return to the emergency department if:   You have a red streak running up your leg or arm  Contact your healthcare provider if:   Your pain is getting worse  Your nail and skin are more swollen or start to drain pus  You have a fever or chills  Your ingrown nail is not better in 7 days  You have questions or concerns about your condition or care  Medicines:   Acetaminophen  decreases pain and can be bought without a doctor's order  Ask how much to take and how often to take it  Follow directions  Acetaminophen can cause liver damage if not taken correctly  NSAIDs , such as ibuprofen, help decrease swelling, pain, and fever  This medicine is available with or without a doctor's order  NSAIDs can cause stomach bleeding or kidney problems in certain people  If you take blood thinner medicine, always ask your healthcare provider if NSAIDs are safe for you  Always read the medicine label and follow directions  Antibiotics  help treat or prevent a bacterial infection  They may be given as an ointment, pill, or both  Take your medicine as directed  Contact your healthcare provider if you think your medicine is not helping or if you have side effects  Tell your provider if you are allergic to any medicine  Keep a list of the medicines, vitamins, and herbs you take  Include the amounts, and when and why you take them  Bring the list or the pill bottles to follow-up visits  Carry your medicine list with you in case of an emergency  Self-care:   Soak and lift the nail  Soak your ingrown nail in warm water for 20 minutes, 2 to 3 times each day  Then gently lift the edge of the ingrown nail away from the skin   Wedge a small piece of cotton or gauze under the corner of the nail  You can also put dental floss under the nail to lift the edge away from the skin  This may help keep the nail from growing into the skin  Keep your nails clean and dry  Wash your hands and feet with soap and water  Pat dry with a clean towel  Dry in between each toe  Do not put lotion between your toes  Prevent another ingrown nail:   Carefully trim your nails  Cut your nails straight across  Do not cut them too short  Lightly file the nail corners if you have sharp edges  Do not round your nails  Do not rip or tear off the tips of your nails  This may cause your nail edge to grow into the skin  Use clippers, not nail scissors  Wear shoes and socks that fit well  Make sure they are not too tight  You may need to wear a shoe with the toe cut out, such as sandals, until your ingrown toenail heals  Do not wear shoes that have pointed toes or heels that are more than 2 inches high  Do not wear tight hose or socks  Wear socks that pull moisture away from your feet, such as cotton-acrylic blends  Inspect your nails daily  Look for signs of an ingrown nail  Manage problems early so the nail does not become infected  Follow up with your healthcare provider as directed: You may be referred to a podiatrist  Write down your questions so you remember to ask them during your visits  © Copyright Kasi Castaneda 2022 Information is for End User's use only and may not be sold, redistributed or otherwise used for commercial purposes  The above information is an  only  It is not intended as medical advice for individual conditions or treatments  Talk to your doctor, nurse or pharmacist before following any medical regimen to see if it is safe and effective for you

## 2023-03-15 NOTE — PROGRESS NOTES
Assessment/Plan:    No problem-specific Assessment & Plan notes found for this encounter  Diagnoses and all orders for this visit:    Beau's line    Onycholysis    Type II diabetes mellitus with hyperosmolarity, uncontrolled ([address] )      -Nails were removed to the patient tolerated  There is minimal blood but there is slight skin insult lateral aspect right hallux  - She is to follow-up as needed, she is to soak this in Epsom salts next 2 to 3 days until blood resolved, if this area is not healed within the next 1 to 2 weeks she is to return to clinic for repeat evaluation of her nails  - I discussed the absolute importance to control her diabetes again at length, all of her A1c has decreased from 14-10 she is doing uncontrolled status and very high risk for nonhealing wound complications and due to her underlying infection and digital loss  - She is to check this daily if she has any signs of infection she is to immediately report to clinic    Subjective:      Patient ID: Doug Harper is a 65 y o  female  Patient presents for evaluation management of her bilateral big toenails, she states that the blood in her nails has resolved  The nails have started to lift off and they are causing her pain in her toes and she presents with a right big toe covered in denies any drainage  She wishes that she had to had the initial surgery to take care of the nails that day  She states that her A1c has decreased from 14-10      The following portions of the patient's history were reviewed and updated as appropriate: allergies, current medications, past family history, past medical history, past social history, past surgical history and problem list     Review of Systems   Constitutional: Negative for chills and fever  HENT: Negative for ear pain and sore throat  Eyes: Negative for pain and visual disturbance  Respiratory: Negative for cough and shortness of breath      Cardiovascular: Negative for chest pain and palpitations  Gastrointestinal: Negative for abdominal pain and vomiting  Genitourinary: Negative for dysuria and hematuria  Musculoskeletal: Negative for arthralgias and back pain  Skin: Negative for color change and rash  Neurological: Negative for seizures and syncope  All other systems reviewed and are negative  Objective:      /80 (BP Location: Left arm, Patient Position: Sitting, Cuff Size: Standard)   Pulse 66   Ht 5' 3" (1 6 m)   Wt 55 8 kg (123 lb)   BMI 21 79 kg/m²          Physical Exam  Vitals reviewed  Constitutional:       Appearance: Normal appearance  HENT:      Head: Normocephalic and atraumatic  Nose: Nose normal       Mouth/Throat:      Mouth: Mucous membranes are moist    Eyes:      Pupils: Pupils are equal, round, and reactive to light  Pulmonary:      Effort: Pulmonary effort is normal    Musculoskeletal:      Comments: Beau's lines transversely across the hallux nail plates  They are lifting, there is pain within the lateral aspect of the right hallux nail plate  There is new nail returning under the nails  Skin:     Capillary Refill: Capillary refill takes less than 2 seconds  Neurological:      General: No focal deficit present  Mental Status: She is alert and oriented to person, place, and time  Mental status is at baseline

## 2023-05-03 DIAGNOSIS — E11.00 TYPE II DIABETES MELLITUS WITH HYPEROSMOLARITY, UNCONTROLLED (HCC): ICD-10-CM

## 2023-05-03 DIAGNOSIS — K21.9 GASTROESOPHAGEAL REFLUX DISEASE WITHOUT ESOPHAGITIS: ICD-10-CM

## 2023-05-03 DIAGNOSIS — I10 ESSENTIAL HYPERTENSION: ICD-10-CM

## 2023-05-03 DIAGNOSIS — E11.65 TYPE II DIABETES MELLITUS WITH HYPEROSMOLARITY, UNCONTROLLED (HCC): ICD-10-CM

## 2023-05-03 DIAGNOSIS — E11.65 UNCONTROLLED TYPE 2 DIABETES MELLITUS WITH HYPERGLYCEMIA (HCC): ICD-10-CM

## 2023-05-03 RX ORDER — INSULIN DEGLUDEC 200 U/ML
INJECTION, SOLUTION SUBCUTANEOUS
Qty: 12 ML | Refills: 3 | Status: SHIPPED | OUTPATIENT
Start: 2023-05-03

## 2023-05-03 RX ORDER — INSULIN ASPART 100 [IU]/ML
10 INJECTION, SOLUTION INTRAVENOUS; SUBCUTANEOUS
Qty: 30 ML | Refills: 2 | Status: SHIPPED | OUTPATIENT
Start: 2023-05-03

## 2023-05-03 RX ORDER — PEN NEEDLE, DIABETIC 32GX 5/32"
NEEDLE, DISPOSABLE MISCELLANEOUS DAILY
Qty: 30 EACH | Refills: 11 | Status: SHIPPED | OUTPATIENT
Start: 2023-05-03

## 2023-05-17 ENCOUNTER — APPOINTMENT (EMERGENCY)
Dept: CT IMAGING | Facility: HOSPITAL | Age: 58
End: 2023-05-17

## 2023-05-17 ENCOUNTER — HOSPITAL ENCOUNTER (INPATIENT)
Facility: HOSPITAL | Age: 58
LOS: 3 days | Discharge: HOME HEALTH | End: 2023-05-20
Attending: EMERGENCY MEDICINE | Admitting: FAMILY MEDICINE

## 2023-05-17 DIAGNOSIS — R10.9 ABDOMINAL PAIN: Primary | ICD-10-CM

## 2023-05-17 DIAGNOSIS — K64.9 HEMORRHOID: ICD-10-CM

## 2023-05-17 DIAGNOSIS — K52.9 ENTERITIS: ICD-10-CM

## 2023-05-17 LAB
ALBUMIN SERPL BCP-MCNC: 3.7 G/DL (ref 3.5–5)
ALP SERPL-CCNC: 94 U/L (ref 34–104)
ALT SERPL W P-5'-P-CCNC: 10 U/L (ref 7–52)
ANION GAP SERPL CALCULATED.3IONS-SCNC: 5 MMOL/L (ref 4–13)
AST SERPL W P-5'-P-CCNC: 8 U/L (ref 13–39)
BACTERIA UR QL AUTO: NORMAL /HPF
BASOPHILS # BLD AUTO: 0.02 THOUSANDS/ÂΜL (ref 0–0.1)
BASOPHILS NFR BLD AUTO: 0 % (ref 0–1)
BILIRUB SERPL-MCNC: 0.41 MG/DL (ref 0.2–1)
BILIRUB UR QL STRIP: NEGATIVE
BUN SERPL-MCNC: 15 MG/DL (ref 5–25)
CALCIUM SERPL-MCNC: 9 MG/DL (ref 8.4–10.2)
CHLORIDE SERPL-SCNC: 102 MMOL/L (ref 96–108)
CLARITY UR: CLEAR
CO2 SERPL-SCNC: 28 MMOL/L (ref 21–32)
COLOR UR: COLORLESS
CREAT SERPL-MCNC: 0.67 MG/DL (ref 0.6–1.3)
EOSINOPHIL # BLD AUTO: 0.09 THOUSAND/ÂΜL (ref 0–0.61)
EOSINOPHIL NFR BLD AUTO: 2 % (ref 0–6)
ERYTHROCYTE [DISTWIDTH] IN BLOOD BY AUTOMATED COUNT: 11.4 % (ref 11.6–15.1)
GFR SERPL CREATININE-BSD FRML MDRD: 97 ML/MIN/1.73SQ M
GLUCOSE SERPL-MCNC: 457 MG/DL (ref 65–140)
GLUCOSE UR STRIP-MCNC: ABNORMAL MG/DL
HCT VFR BLD AUTO: 40.4 % (ref 34.8–46.1)
HGB BLD-MCNC: 13.5 G/DL (ref 11.5–15.4)
HGB UR QL STRIP.AUTO: NEGATIVE
IMM GRANULOCYTES # BLD AUTO: 0.02 THOUSAND/UL (ref 0–0.2)
IMM GRANULOCYTES NFR BLD AUTO: 0 % (ref 0–2)
KETONES UR STRIP-MCNC: NEGATIVE MG/DL
LEUKOCYTE ESTERASE UR QL STRIP: ABNORMAL
LIPASE SERPL-CCNC: 22 U/L (ref 11–82)
LYMPHOCYTES # BLD AUTO: 2.23 THOUSANDS/ÂΜL (ref 0.6–4.47)
LYMPHOCYTES NFR BLD AUTO: 38 % (ref 14–44)
MCH RBC QN AUTO: 29.3 PG (ref 26.8–34.3)
MCHC RBC AUTO-ENTMCNC: 33.4 G/DL (ref 31.4–37.4)
MCV RBC AUTO: 88 FL (ref 82–98)
MONOCYTES # BLD AUTO: 0.49 THOUSAND/ÂΜL (ref 0.17–1.22)
MONOCYTES NFR BLD AUTO: 8 % (ref 4–12)
NEUTROPHILS # BLD AUTO: 3.03 THOUSANDS/ÂΜL (ref 1.85–7.62)
NEUTS SEG NFR BLD AUTO: 52 % (ref 43–75)
NITRITE UR QL STRIP: NEGATIVE
NON-SQ EPI CELLS URNS QL MICRO: NORMAL /HPF
NRBC BLD AUTO-RTO: 0 /100 WBCS
PH UR STRIP.AUTO: 5 [PH]
PLATELET # BLD AUTO: 197 THOUSANDS/UL (ref 149–390)
PMV BLD AUTO: 9.5 FL (ref 8.9–12.7)
POTASSIUM SERPL-SCNC: 4.3 MMOL/L (ref 3.5–5.3)
PROT SERPL-MCNC: 6.9 G/DL (ref 6.4–8.4)
PROT UR STRIP-MCNC: NEGATIVE MG/DL
RBC # BLD AUTO: 4.61 MILLION/UL (ref 3.81–5.12)
RBC #/AREA URNS AUTO: NORMAL /HPF
SODIUM SERPL-SCNC: 135 MMOL/L (ref 135–147)
SP GR UR STRIP.AUTO: 1.04 (ref 1–1.03)
UROBILINOGEN UR STRIP-ACNC: <2 MG/DL
WBC # BLD AUTO: 5.88 THOUSAND/UL (ref 4.31–10.16)
WBC #/AREA URNS AUTO: NORMAL /HPF

## 2023-05-17 RX ORDER — INSULIN LISPRO 100 [IU]/ML
5 INJECTION, SOLUTION INTRAVENOUS; SUBCUTANEOUS ONCE
Status: COMPLETED | OUTPATIENT
Start: 2023-05-17 | End: 2023-05-18

## 2023-05-17 RX ORDER — ONDANSETRON 2 MG/ML
4 INJECTION INTRAMUSCULAR; INTRAVENOUS ONCE
Status: COMPLETED | OUTPATIENT
Start: 2023-05-17 | End: 2023-05-17

## 2023-05-17 RX ORDER — KETOROLAC TROMETHAMINE 30 MG/ML
15 INJECTION, SOLUTION INTRAMUSCULAR; INTRAVENOUS ONCE
Status: COMPLETED | OUTPATIENT
Start: 2023-05-17 | End: 2023-05-17

## 2023-05-17 RX ORDER — MORPHINE SULFATE 4 MG/ML
4 INJECTION, SOLUTION INTRAMUSCULAR; INTRAVENOUS ONCE
Status: COMPLETED | OUTPATIENT
Start: 2023-05-17 | End: 2023-05-17

## 2023-05-17 RX ORDER — INSULIN LISPRO 100 [IU]/ML
1-5 INJECTION, SOLUTION INTRAVENOUS; SUBCUTANEOUS
Status: DISCONTINUED | OUTPATIENT
Start: 2023-05-18 | End: 2023-05-18

## 2023-05-17 RX ADMIN — ONDANSETRON 4 MG: 2 INJECTION INTRAMUSCULAR; INTRAVENOUS at 20:12

## 2023-05-17 RX ADMIN — SODIUM CHLORIDE 1000 ML: 0.9 INJECTION, SOLUTION INTRAVENOUS at 20:15

## 2023-05-17 RX ADMIN — KETOROLAC TROMETHAMINE 15 MG: 30 INJECTION, SOLUTION INTRAMUSCULAR; INTRAVENOUS at 20:14

## 2023-05-17 RX ADMIN — MORPHINE SULFATE 4 MG: 4 INJECTION INTRAVENOUS at 21:44

## 2023-05-17 RX ADMIN — IOHEXOL 100 ML: 350 INJECTION, SOLUTION INTRAVENOUS at 20:39

## 2023-05-18 ENCOUNTER — APPOINTMENT (INPATIENT)
Dept: CT IMAGING | Facility: HOSPITAL | Age: 58
End: 2023-05-18

## 2023-05-18 LAB
ALBUMIN SERPL BCP-MCNC: 3.3 G/DL (ref 3.5–5)
ALP SERPL-CCNC: 77 U/L (ref 34–104)
ALT SERPL W P-5'-P-CCNC: 9 U/L (ref 7–52)
ANION GAP SERPL CALCULATED.3IONS-SCNC: 5 MMOL/L (ref 4–13)
AST SERPL W P-5'-P-CCNC: 10 U/L (ref 13–39)
BASOPHILS # BLD AUTO: 0.01 THOUSANDS/ÂΜL (ref 0–0.1)
BASOPHILS NFR BLD AUTO: 0 % (ref 0–1)
BILIRUB SERPL-MCNC: 0.53 MG/DL (ref 0.2–1)
BUN SERPL-MCNC: 14 MG/DL (ref 5–25)
CALCIUM ALBUM COR SERPL-MCNC: 9 MG/DL (ref 8.3–10.1)
CALCIUM SERPL-MCNC: 8.4 MG/DL (ref 8.4–10.2)
CHLORIDE SERPL-SCNC: 109 MMOL/L (ref 96–108)
CO2 SERPL-SCNC: 27 MMOL/L (ref 21–32)
CREAT SERPL-MCNC: 0.53 MG/DL (ref 0.6–1.3)
EOSINOPHIL # BLD AUTO: 0.13 THOUSAND/ÂΜL (ref 0–0.61)
EOSINOPHIL NFR BLD AUTO: 2 % (ref 0–6)
ERYTHROCYTE [DISTWIDTH] IN BLOOD BY AUTOMATED COUNT: 11.5 % (ref 11.6–15.1)
EST. AVERAGE GLUCOSE BLD GHB EST-MCNC: 324 MG/DL
GFR SERPL CREATININE-BSD FRML MDRD: 105 ML/MIN/1.73SQ M
GLUCOSE SERPL-MCNC: 110 MG/DL (ref 65–140)
GLUCOSE SERPL-MCNC: 135 MG/DL (ref 65–140)
GLUCOSE SERPL-MCNC: 155 MG/DL (ref 65–140)
GLUCOSE SERPL-MCNC: 171 MG/DL (ref 65–140)
GLUCOSE SERPL-MCNC: 256 MG/DL (ref 65–140)
HBA1C MFR BLD: 12.9 %
HCT VFR BLD AUTO: 36.6 % (ref 34.8–46.1)
HGB BLD-MCNC: 12 G/DL (ref 11.5–15.4)
IMM GRANULOCYTES # BLD AUTO: 0.02 THOUSAND/UL (ref 0–0.2)
IMM GRANULOCYTES NFR BLD AUTO: 0 % (ref 0–2)
LYMPHOCYTES # BLD AUTO: 2.79 THOUSANDS/ÂΜL (ref 0.6–4.47)
LYMPHOCYTES NFR BLD AUTO: 52 % (ref 14–44)
MCH RBC QN AUTO: 28.8 PG (ref 26.8–34.3)
MCHC RBC AUTO-ENTMCNC: 32.8 G/DL (ref 31.4–37.4)
MCV RBC AUTO: 88 FL (ref 82–98)
MONOCYTES # BLD AUTO: 0.47 THOUSAND/ÂΜL (ref 0.17–1.22)
MONOCYTES NFR BLD AUTO: 9 % (ref 4–12)
NEUTROPHILS # BLD AUTO: 1.99 THOUSANDS/ÂΜL (ref 1.85–7.62)
NEUTS SEG NFR BLD AUTO: 37 % (ref 43–75)
NRBC BLD AUTO-RTO: 0 /100 WBCS
PLATELET # BLD AUTO: 179 THOUSANDS/UL (ref 149–390)
PMV BLD AUTO: 9.7 FL (ref 8.9–12.7)
POTASSIUM SERPL-SCNC: 3.6 MMOL/L (ref 3.5–5.3)
PROCALCITONIN SERPL-MCNC: <0.05 NG/ML
PROT SERPL-MCNC: 6.1 G/DL (ref 6.4–8.4)
RBC # BLD AUTO: 4.16 MILLION/UL (ref 3.81–5.12)
SODIUM SERPL-SCNC: 141 MMOL/L (ref 135–147)
WBC # BLD AUTO: 5.41 THOUSAND/UL (ref 4.31–10.16)

## 2023-05-18 RX ORDER — DOCUSATE SODIUM 100 MG/1
100 CAPSULE, LIQUID FILLED ORAL 2 TIMES DAILY PRN
Status: DISCONTINUED | OUTPATIENT
Start: 2023-05-18 | End: 2023-05-19

## 2023-05-18 RX ORDER — CHLORAL HYDRATE 500 MG
1000 CAPSULE ORAL DAILY
Status: DISCONTINUED | OUTPATIENT
Start: 2023-05-18 | End: 2023-05-20 | Stop reason: HOSPADM

## 2023-05-18 RX ORDER — INSULIN GLARGINE 100 [IU]/ML
10 INJECTION, SOLUTION SUBCUTANEOUS
Status: DISCONTINUED | OUTPATIENT
Start: 2023-05-18 | End: 2023-05-20 | Stop reason: HOSPADM

## 2023-05-18 RX ORDER — SODIUM CHLORIDE 9 MG/ML
125 INJECTION, SOLUTION INTRAVENOUS CONTINUOUS
Status: DISCONTINUED | OUTPATIENT
Start: 2023-05-18 | End: 2023-05-19

## 2023-05-18 RX ORDER — INSULIN GLARGINE 100 [IU]/ML
10 INJECTION, SOLUTION SUBCUTANEOUS EVERY 12 HOURS SCHEDULED
Status: DISCONTINUED | OUTPATIENT
Start: 2023-05-18 | End: 2023-05-18

## 2023-05-18 RX ORDER — ENOXAPARIN SODIUM 100 MG/ML
40 INJECTION SUBCUTANEOUS DAILY
Status: DISCONTINUED | OUTPATIENT
Start: 2023-05-18 | End: 2023-05-20 | Stop reason: HOSPADM

## 2023-05-18 RX ORDER — PANTOPRAZOLE SODIUM 20 MG/1
20 TABLET, DELAYED RELEASE ORAL
Status: DISCONTINUED | OUTPATIENT
Start: 2023-05-18 | End: 2023-05-20 | Stop reason: HOSPADM

## 2023-05-18 RX ORDER — CIPROFLOXACIN 2 MG/ML
400 INJECTION, SOLUTION INTRAVENOUS EVERY 12 HOURS
Status: DISCONTINUED | OUTPATIENT
Start: 2023-05-18 | End: 2023-05-18

## 2023-05-18 RX ORDER — ASCORBIC ACID 500 MG
500 TABLET ORAL DAILY
Status: DISCONTINUED | OUTPATIENT
Start: 2023-05-18 | End: 2023-05-20 | Stop reason: HOSPADM

## 2023-05-18 RX ORDER — INSULIN LISPRO 100 [IU]/ML
1-5 INJECTION, SOLUTION INTRAVENOUS; SUBCUTANEOUS EVERY 6 HOURS
Status: DISCONTINUED | OUTPATIENT
Start: 2023-05-18 | End: 2023-05-20 | Stop reason: HOSPADM

## 2023-05-18 RX ORDER — ACETAMINOPHEN 325 MG/1
650 TABLET ORAL EVERY 6 HOURS PRN
Status: DISCONTINUED | OUTPATIENT
Start: 2023-05-18 | End: 2023-05-20 | Stop reason: HOSPADM

## 2023-05-18 RX ORDER — ONDANSETRON 2 MG/ML
4 INJECTION INTRAMUSCULAR; INTRAVENOUS EVERY 6 HOURS PRN
Status: DISCONTINUED | OUTPATIENT
Start: 2023-05-18 | End: 2023-05-20 | Stop reason: HOSPADM

## 2023-05-18 RX ORDER — INSULIN GLARGINE 100 [IU]/ML
5 INJECTION, SOLUTION SUBCUTANEOUS EVERY MORNING
Status: DISCONTINUED | OUTPATIENT
Start: 2023-05-18 | End: 2023-05-20 | Stop reason: HOSPADM

## 2023-05-18 RX ORDER — METRONIDAZOLE 500 MG/100ML
500 INJECTION, SOLUTION INTRAVENOUS EVERY 8 HOURS
Status: DISCONTINUED | OUTPATIENT
Start: 2023-05-18 | End: 2023-05-18

## 2023-05-18 RX ADMIN — MORPHINE SULFATE 2 MG: 2 INJECTION, SOLUTION INTRAMUSCULAR; INTRAVENOUS at 10:29

## 2023-05-18 RX ADMIN — PANTOPRAZOLE SODIUM 20 MG: 20 TABLET, DELAYED RELEASE ORAL at 06:22

## 2023-05-18 RX ADMIN — ENOXAPARIN SODIUM 40 MG: 40 INJECTION SUBCUTANEOUS at 09:17

## 2023-05-18 RX ADMIN — INSULIN GLARGINE 10 UNITS: 100 INJECTION, SOLUTION SUBCUTANEOUS at 21:40

## 2023-05-18 RX ADMIN — ACETAMINOPHEN 650 MG: 325 TABLET ORAL at 20:01

## 2023-05-18 RX ADMIN — INSULIN LISPRO 5 UNITS: 100 INJECTION, SOLUTION INTRAVENOUS; SUBCUTANEOUS at 00:27

## 2023-05-18 RX ADMIN — CYANOCOBALAMIN TAB 500 MCG 500 MCG: 500 TAB at 09:16

## 2023-05-18 RX ADMIN — SODIUM CHLORIDE 125 ML/HR: 0.9 INJECTION, SOLUTION INTRAVENOUS at 00:38

## 2023-05-18 RX ADMIN — OMEGA-3 FATTY ACIDS CAP 1000 MG 1000 MG: 1000 CAP at 09:16

## 2023-05-18 RX ADMIN — METRONIDAZOLE 500 MG: 500 INJECTION, SOLUTION INTRAVENOUS at 12:07

## 2023-05-18 RX ADMIN — OXYCODONE HYDROCHLORIDE AND ACETAMINOPHEN 500 MG: 500 TABLET ORAL at 09:16

## 2023-05-18 RX ADMIN — CIPROFLOXACIN 400 MG: 2 INJECTION, SOLUTION INTRAVENOUS at 10:21

## 2023-05-18 RX ADMIN — ONDANSETRON 4 MG: 2 INJECTION INTRAMUSCULAR; INTRAVENOUS at 11:32

## 2023-05-18 RX ADMIN — SODIUM CHLORIDE 125 ML/HR: 0.9 INJECTION, SOLUTION INTRAVENOUS at 16:29

## 2023-05-18 RX ADMIN — ACETAMINOPHEN 650 MG: 325 TABLET ORAL at 11:30

## 2023-05-18 RX ADMIN — ASPIRIN 81 MG: 81 TABLET, COATED ORAL at 09:16

## 2023-05-18 NOTE — CONSULTS
Consultation -  [facility]  Bharat Chaves 63 y o  female MRN: [id number]  Unit/Bed#: ED 23 Encounter: [id number]        Consults    Reason for Consult / Principal Problem: Abdominal Pain    HPI: Katherine Cazares is a 62 yo F with a PMH of uncontrolled DM2, who presented due to 3 days of RLQ pain and pebble like stools  She reports that she had a similar pain about 3 weeks ago but it was brief and resolved and then reoccurred 3 days ago  She reports mild associated nausea and chills at home  She did have diarrhea last week  She denies any black or bloody stool  She reports that her daughter did have a stomach virus recently  She had an endoscopy and a colonoscopy in 2017 with Dr Nuris Ayala which showed hemorrhoids, 1 polyp removed, and moderate gastritis  REVIEW OF SYSTEMS: Negative except for as stated above      Historical Information   Past Medical History:   Diagnosis Date   • Arthritis    • Colonic polyp     resolved: 08/25/2017   • Diabetes mellitus ([facility] [address] )    • Dry eye syndrome of bilateral lacrimal glands     last assessed: 05/15/2015   • GERD (gastroesophageal reflux disease)    • History of screening mammography     last assessed: 07/25/2016   • Hypertension    • Lyme disease    • Osteoarthritis     last assessed: 07/25/2016   • RA (rheumatoid arthritis) ([facility] [address] )    • Rheumatoid arteritis ([facility] )    • Rheumatoid arthritis ([address] )     osteoarthritis   • Stroke ([facility] [address] )    • Symptomatic hypotension     last assessed: 03/22/2017   • Ventral hernia with obstruction and without gangrene     last assessed: 02/16/2017   • Vertigo      Past Surgical History:   Procedure Laterality Date   • BACK SURGERY     • CYST REMOVAL     • EGD AND COLONOSCOPY N/A 2/3/2017    Procedure: COLONOSCOPY; POSSIBLE POLYPECTOMY; POSSIBLE BIOPSY AND EGD ;  Surgeon: Jay Parra MD;  Location: [facility];   Service:    • HERNIA REPAIR     • LIPOMA RESECTION     • OTHER SURGICAL HISTORY      excision of sebaceous cst to back   • TUBAL LIGATION     • VENTRAL HERNIA REPAIR N/A 3/17/2017    Procedure: LAPAROSCOPIC VENTRAL HERNIA REPAIR WITH MESH ;  Surgeon: Daniel Weiss MD;  Location: [facility];  Service:      Social History   Social History     Substance and Sexual Activity   Alcohol Use Not Currently    Comment: Stopped drinking alcohol      Social History     Substance and Sexual Activity   Drug Use No     Social History     Tobacco Use   Smoking Status Former   • Packs/day: 0 25   • Years: 2 00   • Pack years: 0 50   • Types: Cigarettes   • Start date: 80   • Quit date: 200   • Years since quittin 3   Smokeless Tobacco Never     Family History   Problem Relation Age of Onset   • COPD Mother         COPD/Emphysema   • Diabetes Mother    • Heart disease Father    • Stroke Father    • COPD Father         COPD/Emphysema   • Heart attack Father    • Diabetes Paternal Grandmother    • Breast cancer Paternal Aunt    • Depression Daughter        Meds/Allergies     (Not in a hospital admission)    Current Facility-Administered Medications   Medication Dose Route Frequency   • acetaminophen (TYLENOL) tablet 650 mg  650 mg Oral Q6H PRN   • ascorbic acid (VITAMIN C) tablet 500 mg  500 mg Oral Daily   • aspirin (ECOTRIN LOW STRENGTH) EC tablet 81 mg  81 mg Oral Daily   • ciprofloxacin (CIPRO) IVPB (premix in 5% dextrose) 400 mg 200 mL  400 mg Intravenous Q12H   • cyanocobalamin (VITAMIN B-12) tablet 500 mcg  500 mcg Oral Daily   • docusate sodium (COLACE) capsule 100 mg  100 mg Oral BID PRN   • enoxaparin (LOVENOX) subcutaneous injection 40 mg  40 mg Subcutaneous Daily   • fish oil capsule 1,000 mg  1,000 mg Oral Daily   • insulin glargine (LANTUS) subcutaneous injection 10 Units 0 1 mL  10 Units Subcutaneous HS   • insulin glargine (LANTUS) subcutaneous injection 5 Units 0 05 mL  5 Units Subcutaneous QAM   • insulin lispro (HumaLOG) 100 units/mL subcutaneous injection 1-5 Units  1-5 Units Subcutaneous Q6H   • metroNIDAZOLE (FLAGYL) IVPB (premix) 500 mg 100 mL  500 mg Intravenous Q8H   • morphine injection 2 mg  2 mg Intravenous Q4H PRN   • ondansetron (ZOFRAN) injection 4 mg  4 mg Intravenous Q6H PRN   • pantoprazole (PROTONIX) EC tablet 20 mg  20 mg Oral Early Morning   • sodium chloride 0 9 % infusion  125 mL/hr Intravenous Continuous       Allergies   Allergen Reactions   • Penicillins Rash           Objective     Blood pressure 131/69, pulse 73, temperature 97 8 °F (36 6 °C), temperature source Tympanic, resp  rate 16, SpO2 94 %, not currently breastfeeding  Intake/Output Summary (Last 24 hours) at 5/18/2023 1235  Last data filed at 5/17/2023 2215  Gross per 24 hour   Intake 1000 ml   Output --   Net 1000 ml         PHYSICAL EXAM:      General Appearance:   Alert, cooperative, no distress, appears stated age    HEENT:   Normocephalic, atraumatic, anicteric      Neck:  Supple, symmetrical, trachea midline   Lungs:   Clear to auscultation bilaterally; no rales, rhonchi or wheezing; respirations unlabored    Heart[de-identified]   S1 and S2 normal; regular rate and rhythm; no murmur, rub, or gallop     Abdomen:   (+) Distended, BS active, somewhat firm, (+) generalized TTP worse on the R side of the abdomen   Rectal:   Deferred    Extremities:  No cyanosis, clubbing or edema    Pulses:  2+ and symmetric all extremities    Skin:  Skin color, texture, turgor normal, no rashes or lesions      Lab Results:   Results from last 7 days   Lab Units 05/18/23  0506   WBC Thousand/uL 5 41   HEMOGLOBIN g/dL 12 0   HEMATOCRIT % 36 6   PLATELETS Thousands/uL 179   NEUTROS PCT % 37*   LYMPHS PCT % 52*   MONOS PCT % 9   EOS PCT % 2     Results from last 7 days   Lab Units 05/18/23  0506   POTASSIUM mmol/L 3 6   CHLORIDE mmol/L 109*   CO2 mmol/L 27   BUN mg/dL 14   CREATININE mg/dL 0 53*   CALCIUM mg/dL 8 4   ALK PHOS U/L 77   ALT U/L 9   AST U/L 10*         Results from last 7 days   Lab Units 05/17/23 2001   LIPASE u/L 22       Imaging Studies: I have personally reviewed pertinent imaging studies  CT abdomen pelvis wo contrast  Result Date: 5/18/2023  Impression: Redemonstrated severe inflammatory changes associated with multiple small bowel loops clustered within the right anterior abdomen  Although lack of intravenous contrast limits evaluation for rim-enhancing abscess, there is no definite focal drainable fluid collection identified  Small pelvic free fluid  No evidence for bowel obstruction  Punctate nonobstructing right intrarenal calculus  Workstation performed: [id number]     CT abdomen pelvis with contrast  Result Date: 5/17/2023  Impression: There is an area of severe inflammatory change in the mid abdomen surrounded by bowel loops  I suspect this is an intraloop abscess however the lack of oral contrast limits evaluation  I personally discussed this study with Ramonita Hodgson on 5/17/2023 10:24 PM  Workstation performed: [id number]       ASSESSMENT and PLAN:      RLQ Pain  Enteritis  - Presented with 3 days of RLQ pain, nausea, and pebble like bowel movements with CT imaging showed severe inflammatory of several small bowel loops initially with concern for intraloop abscess but follow up CT does not show any focal fluid collection  - No leukocytosis and no clinical evidence of obstruction with passing flatus, BMs, and normoactive BS  - Will trial clear liquid diet  - Serial daily abdominal exams  - Supportive care  - Advance diet as tolerated  - Discussed with surgical team, she has prior CT imaging in 2018 that looks similar so would recommend colonoscopy as outpatient to re-eval colon and TI to rule out inflammatory bowel disease      The patient will be seen and examined by Dr Dale Katz

## 2023-05-18 NOTE — H&P
[address] [place] [address]  H&P  Name: Jose Angel West 63 y o  female I MRN: [id number]  Unit/Bed#: ED 23 I Date of Admission: 5/17/2023   Date of Service: 5/18/2023 I Hospital Day: 1      Assessment/Plan   * Enteritis  Assessment & Plan  · CT a/p: There is an area of severe inflammatory change in the mid abdomen surrounded by bowel loops  May be intraloop abscess however the lack of oral contrast limits evaluation  · WBC is normal   · ED discussed with general surgery, plan for repeat CT in a m  with oral contrast  · General surgery consult  · GI consult r/o IBS  · NPO for now  IVFs, pain management     Type II diabetes mellitus with hyperosmolarity, uncontrolled ([address] )  Assessment & Plan    Lab Results   Component Value Date    HGBA1C 10 5 (H) 12/14/2022     · Poorly controlled  · Check hgb a1c  Patient normally on Lantus 10 units every morning, 15 units every afternoon  Will reduce while NPO  Hold Humalog/NovoLog  Humalog SSI q6h  Monitor for hypoglycemia   · CCD       VTE Pharmacologic Prophylaxis: VTE Score: 2 Low Risk (Score 0-2) - Encourage Ambulation  Code Status: Level 1 - Full Code   Discussion with family: Patient declined call to   Anticipated Length of Stay: Patient will be admitted on an observation basis with an anticipated length of stay of less than 2 midnights secondary to enteritis  Total Time Spent on Date of Encounter in care of patient: 55 minutes This time was spent on one or more of the following: performing physical exam; counseling and coordination of care; obtaining or reviewing history; documenting in the medical record; reviewing/ordering tests, medications or procedures; communicating with other healthcare professionals and discussing with patient's family/caregivers  Chief Complaint:   Chief Complaint   Patient presents with   • Abdominal Pain     RLQ pain starting 3 days ago with nausea but no vomiting   Patient reports pain radiates to right side of groin, denies any issues with urination, denies diarrhea  History of Present Illness:  Caroline Munoz is a 63 y o  female with a PMH of T2DM who presents with RLQ pain x 3d  Pain is described as sharp pain that radiates to R groin  Has assoc nausea wo vomiting  States she feels a lump in the RLQ  Has intermittent spasms in the abdomen  Has had chills without fever  No constipation, diarrhea, hematochezia or melena  No changes in urination  Reports 17lb weight loss but is not sure in what duration  Has hx of hernia repair  Review of Systems:  A 10-point review of systems was obtained  Pertinent positives and negatives are outlined in the HPI above  Remainder of review of systems are otherwise negative  Past Medical and Surgical History:   Past Medical History:   Diagnosis Date   • Arthritis    • Colonic polyp     resolved: 08/25/2017   • Diabetes mellitus ([facility] [address] )    • Dry eye syndrome of bilateral lacrimal glands     last assessed: 05/15/2015   • GERD (gastroesophageal reflux disease)    • History of screening mammography     last assessed: 07/25/2016   • Hypertension    • Lyme disease    • Osteoarthritis     last assessed: 07/25/2016   • RA (rheumatoid arthritis) ([facility] [address] )    • Rheumatoid arteritis (HCC)    • Rheumatoid arthritis ([facility] [address] )     osteoarthritis   • Stroke ([facility] [address] )    • Symptomatic hypotension     last assessed: 03/22/2017   • Ventral hernia with obstruction and without gangrene     last assessed: 02/16/2017   • Vertigo        Past Surgical History:   Procedure Laterality Date   • BACK SURGERY     • CYST REMOVAL     • EGD AND COLONOSCOPY N/A 2/3/2017    Procedure: COLONOSCOPY; POSSIBLE POLYPECTOMY; POSSIBLE BIOPSY AND EGD ;  Surgeon: Jamie Garza MD;  Location: [facility];   Service:    • HERNIA REPAIR     • LIPOMA RESECTION     • OTHER SURGICAL HISTORY      excision of sebaceous cst to back   • TUBAL LIGATION     • VENTRAL HERNIA REPAIR N/A 3/17/2017    Procedure: LAPAROSCOPIC VENTRAL HERNIA REPAIR WITH MESH ;  Surgeon: Jamie Garza MD;  Location: [facility];  Service:        Meds/Allergies:  Prior to Admission medications    Medication Sig Start Date End Date Taking? Authorizing Provider   ascorbic acid (VITAMIN C) 500 mg tablet Take 1 tablet (500 mg total) by mouth daily 4/17/20   Sherin Acosta DO   aspirin (ECOTRIN LOW STRENGTH) 81 mg EC tablet Take 81 mg by mouth daily    Historical Provider, MD   Biotin w/ Vitamins C & E (HAIR/SKIN/NAILS PO) Take 2 capsules by mouth in the morning    Historical Provider, MD   Blood Glucose Monitoring Suppl (ONE TOUCH ULTRA 2) w/Device KIT Use 4 (four) times a day 11/29/22   Sherin Acosta DO   calcium carbonate (TUMS) 500 mg chewable tablet Chew 1 tablet daily PRN    Historical Provider, MD   chlorzoxazone (PARAFON FORTE) 500 mg tablet Take 1 tablet (500 mg total) by mouth 4 (four) times a day as needed for muscle spasms 1/5/23   Rocio Lee, DO   cyanocobalamin (VITAMIN B-12) 500 MCG tablet Take 500 mcg by mouth daily    Historical Provider, MD   dimenhyDRINATE (DRAMAMINE) 50 mg tablet Take 50 mg by mouth 2 (two) times a day      Historical Provider, MD   docusate sodium (COLACE) 100 mg capsule Take 100 mg by mouth 2 (two) times a day as needed      Historical Provider, MD   glucose blood (OneTouch Ultra) test strip Test blood sugar 3 to 4 times daily 8/31/22   Sherin Acosta DO   hydrocortisone (ANUSOL-HC) 2 5 % rectal cream Apply topically 2 (two) times a day  Patient taking differently: Apply topically 2 (two) times a day PRN 11/12/21   Dominic Kimbrough PA-C   insulin aspart (NovoLOG FlexPen) 100 UNIT/ML injection pen Inject 10 Units under the skin 3 (three) times a day with meals 5/3/23   Sherin Acosta DO   insulin degludec Luane Ally FlexTouch) 200 units/mL CONCENTRATED U-200 injection pen Inject 10 units at 11:00 a m   And 15 units at evening meal 5/3/23   Kwaku Lee DO   insulin lispro (HumaLOG KwikPen) 100 units/mL injection pen Inject 10 Units under the skin 3 (three) times a day with meals 22   Kim Gilbert DO   Insulin Pen Needle (BD Pen Needle Ignacia 2nd Gen) 32G X 4 MM MISC Inject under the skin daily 5/3/23   Kim Gilbert DO   meloxicam (Mobic) 15 mg tablet Take 1 tablet (15 mg total) by mouth daily 23   Kim Gilbert DO   metFORMIN (GLUCOPHAGE) 1000 MG tablet Take 1 tablet (1,000 mg total) by mouth 2 (two) times a day with meals 5/3/23   Kim Gilbert DO   methylcellulose (CITRUCEL) 500 mg tablet Take 1,000 mg by mouth daily    Patient not taking: Reported on 2023    Historical Provider, MD   Multiple Vitamin (MULTI-VITAMIN DAILY PO) Take by mouth    Historical Provider, MD   Omega-3 Fatty Acids (fish oil) 1,000 mg Take 1,000 mg by mouth daily    Historical Provider, MD   omeprazole (PriLOSEC) 20 mg delayed release capsule Take 1 capsule (20 mg total) by mouth daily before breakfast 20   Kim Gilbert DO   OneTouch Delica Lancets 21M MISC Use 4 (four) times a day Test blood sugar 3 to 4 times daily 22   Kim Gilbert DO     I have reviewed home medications with patient personally  Allergies:    Allergies   Allergen Reactions   • Penicillins Rash       Social History:  Marital Status: /Civil Union   Occupation:   Patient Pre-hospital Living Situation: Home  Patient Pre-hospital Level of Mobility: walks  Patient Pre-hospital Diet Restrictions:   Substance Use History:   Social History     Substance and Sexual Activity   Alcohol Use Not Currently    Comment: Stopped drinking alcohol      Social History     Tobacco Use   Smoking Status Former   • Packs/day: 0 25   • Years: 2 00   • Pack years: 0 50   • Types: Cigarettes   • Start date: 80   • Quit date: 200   • Years since quittin 3   Smokeless Tobacco Never     Social History     Substance and Sexual Activity   Drug Use No       Family History:  Family History   Problem Relation Age of Onset   • COPD Mother COPD/Emphysema   • Diabetes Mother    • Heart disease Father    • Stroke Father    • COPD Father         COPD/Emphysema   • Heart attack Father    • Diabetes Paternal Grandmother    • Breast cancer Paternal Aunt    • Depression Daughter        Physical Exam:     Vitals:   Blood Pressure: 131/69 (05/18/23 0600)  Pulse: 73 (05/18/23 0600)  Temperature: 97 8 °F (36 6 °C) (05/17/23 1925)  Temp Source: Tympanic (05/17/23 1925)  Respirations: 16 (05/18/23 0600)  SpO2: 94 % (05/18/23 0600)    Physical Exam  Vitals and nursing note reviewed  Constitutional:       General: She is not in acute distress  Appearance: She is well-developed  HENT:      Head: Normocephalic and atraumatic  Eyes:      Conjunctiva/sclera: Conjunctivae normal    Cardiovascular:      Rate and Rhythm: Normal rate and regular rhythm  Heart sounds: No murmur heard  Pulmonary:      Effort: Pulmonary effort is normal  No respiratory distress  Breath sounds: Normal breath sounds  Abdominal:      Palpations: Abdomen is soft  Tenderness: There is abdominal tenderness (RLQ)  There is no guarding or rebound  Musculoskeletal:         General: No swelling  Cervical back: Neck supple  Skin:     General: Skin is warm and dry  Capillary Refill: Capillary refill takes less than 2 seconds  Neurological:      Mental Status: She is alert     Psychiatric:         Mood and Affect: Mood normal           Additional Data:     Lab Results:  Results from last 7 days   Lab Units 05/18/23  0506   WBC Thousand/uL 5 41   HEMOGLOBIN g/dL 12 0   HEMATOCRIT % 36 6   PLATELETS Thousands/uL 179   NEUTROS PCT % 37*   LYMPHS PCT % 52*   MONOS PCT % 9   EOS PCT % 2     Results from last 7 days   Lab Units 05/18/23  0506   SODIUM mmol/L 141   POTASSIUM mmol/L 3 6   CHLORIDE mmol/L 109*   CO2 mmol/L 27   BUN mg/dL 14   CREATININE mg/dL 0 53*   ANION GAP mmol/L 5   CALCIUM mg/dL 8 4   ALBUMIN g/dL 3 3*   TOTAL BILIRUBIN mg/dL 0 53   ALK PHOS U/L 77 ALT U/L 9   AST U/L 10*   GLUCOSE RANDOM mg/dL 135         Results from last 7 days   Lab Units 05/18/23  0019   POC GLUCOSE mg/dl 256*               Lines/Drains:  Invasive Devices     Peripheral Intravenous Line  Duration           Peripheral IV 05/17/23 Left Antecubital <1 day                    Imaging: Reviewed radiology reports from this admission including: abdominal/pelvic CT  CT abdomen pelvis with contrast   Final Result by Traci Medina MD (05/17 2228)      There is an area of severe inflammatory change in the mid abdomen surrounded by bowel loops  I suspect this is an intraloop abscess however the lack of oral contrast limits evaluation  I personally discussed this study with Fadia Amanda on 5/17/2023 10:24 PM                Workstation performed: [id number]         CT abdomen pelvis wo contrast    (Results Pending)       EKG and Other Studies Reviewed on Admission:   · EKG: No EKG obtained  ** Please Note: This note has been constructed using a voice recognition system   **

## 2023-05-18 NOTE — ASSESSMENT & PLAN NOTE
Lab Results   Component Value Date    HGBA1C 10 5 (H) 12/14/2022     · Poorly controlled  · Check hgb a1c  Patient normally on Lantus 10 units every morning, 15 units every afternoon  Will reduce while NPO  Hold Humalog/NovoLog    Humalog SSI q6h  Monitor for hypoglycemia   · CCD

## 2023-05-18 NOTE — ASSESSMENT & PLAN NOTE
CT a/p: There is an area of severe inflammatory change in the mid abdomen surrounded by bowel loops  May be intraloop abscess however the lack of oral contrast limits evaluation  · WBC is normal   · Repeat CT Redemonstrated severe inflammatory changes associated with multiple small bowel loops clustered within the right anterior abdomen  Although lack of intravenous contrast limits evaluation for rim-enhancing abscess, there is no definite focal drainable fluid collection identified  Small pelvic free fluid  · No evidence for bowel obstruction    · General surgery consult, following  · GI following  · Continue with supportive care, advance diet as tolerated  · Not meeting sepsis criteria, monitor off abx

## 2023-05-18 NOTE — UTILIZATION REVIEW
NOTIFICATION OF INPATIENT ADMISSION   AUTHORIZATION REQUEST   SERVICING FACILITY:   [address] [place] [address]  Tax ID: [id number]  NPI: [id number] ATTENDING PROVIDER:  Attending Name and NPI#: Rachael Kidd Md [[id number]]  Address: [address] [place] [address]  Phone: [phone]     ADMISSION INFORMATION:  Place of Service: [place] [address]  Place of Service Code: 21  Inpatient Admission Date/Time: 5/17/23 11:51 PM  Discharge Date/Time: No discharge date for patient encounter  Admitting Diagnosis Code/Description:  Enteritis [K52 9]  Abdominal pain [R10 9]     UTILIZATION REVIEW CONTACT:  Orquidea Fowler Utilization   Network Utilization Review Department  Phone: [phone]  Fax [phone]  Email: [email or web address]  Contact for approvals/pending authorizations, clinical reviews, and discharge  PHYSICIAN ADVISORY SERVICES:  Medical Necessity Denial & [id number] Review  Phone: [phone]  Fax: [phone]  Email: [email or web address]

## 2023-05-18 NOTE — ASSESSMENT & PLAN NOTE
· CT a/p: There is an area of severe inflammatory change in the mid abdomen surrounded by bowel loops  May be intraloop abscess however the lack of oral contrast limits evaluation  · WBC is normal   · ED discussed with general surgery, plan for repeat CT in a m  with oral contrast  · General surgery consult  · GI consult r/o IBS  · NPO for now   IVFs, pain management

## 2023-05-18 NOTE — ASSESSMENT & PLAN NOTE
Lab Results   Component Value Date    HGBA1C 10 5 (H) 12/14/2022     · Poorly controlled  · Check hgb a1c  Patient normally on Lantus 10 units every morning, 15 units every afternoon   Dose reduce while on clear liquid diet, advance as tolerated  Humalog SSI q6h  Monitor for hypoglycemia   · CCD

## 2023-05-18 NOTE — CONSULTS
Consultation - General Surgery  Brunilda Quach 63 y o  female MRN: [id number]  Unit/Bed#: ED 19 Encounter: [id number]                                                  Inpatient consult to Acute Care Surgery  Consult performed by: Klaudia Peña PA-C  Consult ordered by: Randolph Alfaro PA-C        Assessment/Plan   39X F with abdominal pain and abnormal CT scan  Diabetes mellitus type 2 -A1c 5/17/20203 12 9  AVSS, no leukocytosis, WBC 5 4  CT with contrast shows an area of severe inflammatory change in the mid abdomen surrounded by loops of bowel with suspecting an interloop abscess  Reevaluation with oral contrast today redemonstrates severe inflammatory changes associated with multiple small bowel loops clustered within the right anterior abdomen  Comparison of previous CT scan in 2018 shows similar findings with these having resolved in 2 abdominal CT scans in 2019  Suspect enteritis and fluid collection finding resulting from reactivity  Possible inflammatory bowel etiology  Plan  No plan for acute surgical intervention  Recommend GI consultation for further evaluation of possible inflammatory bowel condition  Okay for diet as tolerated from a surgical standpoint  Serial labs and abdominal exams  Incentive spirometry  Analgesia and antiemetics, as needed  DVT prophylaxis  Rest of care per primary team  Nothing further from surgical standpoint  General surgery will sign off but please contact with any further questions  ______________________________________________________________________    CHIEF COMPLAINT: Abdominal pain    HPI: Brunilda Quach is a 63y o  year old female with PMHx of uncontrolled type 2 diabetes who presents with 3 days of right lower quadrant abdominal pain radiating to the suprapubic region  She states she had a similar pain about 3 weeks ago which did resolve  She states it feels like a ball is in her abdomen    She denies any nausea or vomiting or change in appetite and has been eating without difficulty  She denies feeling bloated or distended  She denies any fevers or chills  She denies any changes in her bowels and denies any diarrhea or constipation  She is passing flatus  She does state that yesterday was her last bowel movement and it was very small and brown in color which is unusual   She states that her usual stool color is a black color  She states that occasionally she does have bright red blood when she has a bowel movement and she noticed it in the toilet  She does have a history of hemorrhoids  She denies any urinary discomfort or frequency  She states that deep breaths makes her pain worse but denies anything else that makes the pain better or worse  She did have a colonoscopy in 2017 but has not had one since  She has a history of laparoscopic ventral hernia repair  Review of Systems   Constitutional: Negative for activity change, appetite change, chills and fever  HENT: Negative  Negative for congestion  Respiratory: Negative for cough, shortness of breath and wheezing  Cardiovascular: Negative  Gastrointestinal: Positive for abdominal pain and blood in stool  Negative for abdominal distention, diarrhea, nausea and vomiting  Endocrine: Negative  Genitourinary: Negative for difficulty urinating, dysuria and frequency  Musculoskeletal: Negative  Skin: Negative  Allergic/Immunologic: Negative  Neurological: Negative  Hematological: Negative  Psychiatric/Behavioral: Negative        Meds/Allergies   Allergies   Allergen Reactions   • Penicillins Rash      all current active meds have been reviewed       Historical Information   Past Medical History:   Diagnosis Date   • Arthritis    • Colonic polyp     resolved: 08/25/2017   • Diabetes mellitus ([facility] [address] )    • Dry eye syndrome of bilateral lacrimal glands     last assessed: 05/15/2015   • GERD (gastroesophageal reflux disease)    • History of screening mammography last assessed: 2016   • Hypertension    • Lyme disease    • Osteoarthritis     last assessed: 2016   • RA (rheumatoid arthritis) (HCC)    • Rheumatoid arteritis (HCC)    • Rheumatoid arthritis (HCC)     osteoarthritis   • Stroke [facility])    • Symptomatic hypotension     last assessed: 2017   • Ventral hernia with obstruction and without gangrene     last assessed: 2017   • Vertigo      Past Surgical History:   Procedure Laterality Date   • BACK SURGERY     • CYST REMOVAL     • EGD AND COLONOSCOPY N/A 2/3/2017    Procedure: COLONOSCOPY; POSSIBLE POLYPECTOMY; POSSIBLE BIOPSY AND EGD ;  Surgeon: Loanne Brunner, MD;  Location: [facility];   Service:    • HERNIA REPAIR     • LIPOMA RESECTION     • OTHER SURGICAL HISTORY      excision of sebaceous cst to back   • TUBAL LIGATION     • VENTRAL HERNIA REPAIR N/A 3/17/2017    Procedure: LAPAROSCOPIC VENTRAL HERNIA REPAIR WITH MESH ;  Surgeon: Loanne Brunner, MD;  Location: [facility];  Service:      Social History   Social History     Substance and Sexual Activity   Alcohol Use Not Currently    Comment: Stopped drinking alcohol      Social History     Substance and Sexual Activity   Drug Use No     Social History     Tobacco Use   Smoking Status Former   • Packs/day: 0 25   • Years: 2 00   • Pack years: 0 50   • Types: Cigarettes   • Start date: 80   • Quit date: 200   • Years since quittin 3   Smokeless Tobacco Never       Family History:   Family History   Problem Relation Age of Onset   • COPD Mother         COPD/Emphysema   • Diabetes Mother    • Heart disease Father    • Stroke Father    • COPD Father         COPD/Emphysema   • Heart attack Father    • Diabetes Paternal Grandmother    • Breast cancer Paternal Aunt    • Depression Daughter          Objective   Lab Results:   Lab Results   Component Value Date    WBC 5 41 2023    WBC 7 71 2015    HGB 12 0 2023    HGB 14 3 2015    HCT 36 6 2023    HCT 43 3 05/13/2015     05/18/2023     05/13/2015     Lab Results   Component Value Date     05/13/2015    K 3 6 05/18/2023    K 4 4 05/13/2015     (H) 05/18/2023     05/13/2015    CO2 27 05/18/2023    CO2 28 05/13/2015    BUN 14 05/18/2023    BUN 11 05/13/2015    CREATININE 0 53 (L) 05/18/2023    CREATININE 0 60 05/13/2015    GLUCOSE 194 (H) 05/13/2015     Lab Results   Component Value Date    CALCIUM 8 4 05/18/2023    CALCIUM 9 2 05/13/2015    MG 1 9 05/13/2015     Lab Results   Component Value Date    AST 10 (L) 05/18/2023    AST 21 05/13/2015    ALT 9 05/18/2023    ALT 41 05/13/2015    ALKPHOS 77 05/18/2023    ALKPHOS 121 (H) 05/13/2015    TBILI 0 53 05/18/2023    ALB 3 3 (L) 05/18/2023    ALB 3 6 05/13/2015     Lab Results   Component Value Date    INR 0 98 03/08/2019    INR 1 04 05/13/2015         Intake/Output Summary (Last 24 hours) at 5/18/2023 1352  Last data filed at 5/17/2023 2215  Gross per 24 hour   Intake 1000 ml   Output --   Net 1000 ml     Invasive Devices     Peripheral Intravenous Line  Duration           Peripheral IV 05/17/23 Left Antecubital <1 day                Physical Exam  Vitals: /69   Pulse 73   Temp 97 8 °F (36 6 °C) (Tympanic)   Resp 16   SpO2 94%   GEN: A & O x 3, cooperative   HEENT: PERRLA EOMI, sclera anicterus, oral mucosa   NECK: supple   LUNGS: clear throughout, decreased inspiratory effort  COR: RRR  ABD: Normoactive bowel sounds, abdomen slightly distended but soft and nontympanic, abdominal tenderness in the right upper quadrant and right lower quadrant with guarding  EXTREM: FROM no joint deformities  Edema none BLE  SKIN: Warm, dry, no rash, no jaundice  NEURO: CN II -XII intact, no tremor, affect appropriate    Imaging Studies:   CT abdomen pelvis wo contrast  Result Date: 5/18/2023  Impression: Redemonstrated severe inflammatory changes associated with multiple small bowel loops clustered within the right anterior abdomen   Although lack of intravenous contrast limits evaluation for rim-enhancing abscess, there is no definite focal drainable fluid collection identified  Small pelvic free fluid  No evidence for bowel obstruction  Punctate nonobstructing right intrarenal calculus  Workstation performed: [id number]     CT abdomen pelvis with contrast  Result Date: 5/17/2023  Impression: There is an area of severe inflammatory change in the mid abdomen surrounded by bowel loops  I suspect this is an intraloop abscess however the lack of oral contrast limits evaluation   I personally discussed this study with Jojo Mijares on 5/17/2023 10:24 PM  Workstation performed: [id number]         [place] Valery Bettencourt PA-C  5/18/2023

## 2023-05-18 NOTE — PROGRESS NOTES
[address] [place] [address]  Progress Note  Name: Immanuel Cuello  MRN: [id number]  Unit/Bed#: ED 19 I Date of Admission: 5/17/2023   Date of Service: 5/18/2023 I Hospital Day: 1    Assessment/Plan   Type II diabetes mellitus with hyperosmolarity, uncontrolled (HCC)  Assessment & Plan    Lab Results   Component Value Date    HGBA1C 10 5 (H) 12/14/2022     · Poorly controlled  · Check hgb a1c  Patient normally on Lantus 10 units every morning, 15 units every afternoon  Dose reduce while on clear liquid diet, advance as tolerated  Humalog SSI q6h  Monitor for hypoglycemia   · CCD    * Enteritis  Assessment & Plan  CT a/p: There is an area of severe inflammatory change in the mid abdomen surrounded by bowel loops  May be intraloop abscess however the lack of oral contrast limits evaluation  · WBC is normal   · Repeat CT Redemonstrated severe inflammatory changes associated with multiple small bowel loops clustered within the right anterior abdomen  Although lack of intravenous contrast limits evaluation for rim-enhancing abscess, there is no definite focal drainable fluid collection identified  Small pelvic free fluid  · No evidence for bowel obstruction  · General surgery consult, following  · GI following  · Continue with supportive care, advance diet as tolerated  · Not meeting sepsis criteria, monitor off abx             VTE Pharmacologic Prophylaxis: VTE Score: 2 Low Risk (Score 0-2) - Encourage Ambulation  Patient Centered Rounds: I performed bedside rounds with nursing staff today  Discussions with Specialists or Other Care Team Provider: DUNG    Education and Discussions with Family / Patient: Updated  ( and daughter) at bedside      Total Time Spent on Date of Encounter in care of patient: 45 minutes This time was spent on one or more of the following: performing physical exam; counseling and coordination of care; obtaining or reviewing history; documenting in the medical record; reviewing/ordering tests, medications or procedures; communicating with other healthcare professionals and discussing with patient's family/caregivers  Current Length of Stay: 1 day(s)  Current Patient Status: Inpatient   Certification Statement: The patient will continue to require additional inpatient hospital stay due to Abdominal pain/enteritis  Discharge Plan: Anticipate discharge in 24-48 hrs to discharge location to be determined pending rehab evaluations  Code Status: Level 1 - Full Code    Subjective:   Seen and examined at bedside with both daughters present   is on phone  Updated family  Patient states that her abdominal pain is still present, left lower quadrant  Sharp,spasms  Nauseous  No vomiting  Objective:     Vitals:   Temp (24hrs), Av 8 °F (36 6 °C), Min:97 8 °F (36 6 °C), Max:97 8 °F (36 6 °C)    Temp:  [97 8 °F (36 6 °C)] 97 8 °F (36 6 °C)  HR:  [62-86] 73  Resp:  [15-20] 16  BP: (121-140)/(58-75) 131/69  SpO2:  [93 %-98 %] 94 %  There is no height or weight on file to calculate BMI  Input and Output Summary (last 24 hours): Intake/Output Summary (Last 24 hours) at 2023 1303  Last data filed at 2023 2215  Gross per 24 hour   Intake 1000 ml   Output --   Net 1000 ml       Physical Exam:   Physical Exam  Vitals and nursing note reviewed  Constitutional:       General: She is not in acute distress  Appearance: She is well-developed  HENT:      Head: Normocephalic and atraumatic  Eyes:      Conjunctiva/sclera: Conjunctivae normal    Cardiovascular:      Rate and Rhythm: Normal rate and regular rhythm  Heart sounds: No murmur heard  Pulmonary:      Effort: Pulmonary effort is normal  No respiratory distress  Breath sounds: Normal breath sounds  Abdominal:      Palpations: Abdomen is soft  Tenderness: There is abdominal tenderness  There is no guarding or rebound  Musculoskeletal:         General: No swelling  Cervical back: Neck supple  Skin:     General: Skin is warm and dry  Capillary Refill: Capillary refill takes less than 2 seconds  Neurological:      Mental Status: She is alert and oriented to person, place, and time  Psychiatric:      Comments: Odd affect        Additional Data:     Labs:  Results from last 7 days   Lab Units 05/18/23  0506   WBC Thousand/uL 5 41   HEMOGLOBIN g/dL 12 0   HEMATOCRIT % 36 6   PLATELETS Thousands/uL 179   NEUTROS PCT % 37*   LYMPHS PCT % 52*   MONOS PCT % 9   EOS PCT % 2     Results from last 7 days   Lab Units 05/18/23  0506   SODIUM mmol/L 141   POTASSIUM mmol/L 3 6   CHLORIDE mmol/L 109*   CO2 mmol/L 27   BUN mg/dL 14   CREATININE mg/dL 0 53*   ANION GAP mmol/L 5   CALCIUM mg/dL 8 4   ALBUMIN g/dL 3 3*   TOTAL BILIRUBIN mg/dL 0 53   ALK PHOS U/L 77   ALT U/L 9   AST U/L 10*   GLUCOSE RANDOM mg/dL 135         Results from last 7 days   Lab Units 05/18/23  1218 05/18/23  0715 05/18/23  0019   POC GLUCOSE mg/dl 171* 110 256*         Results from last 7 days   Lab Units 05/18/23  0706   PROCALCITONIN ng/ml <0 05       Lines/Drains:  Invasive Devices     Peripheral Intravenous Line  Duration           Peripheral IV 05/17/23 Left Antecubital <1 day                      Imaging: No pertinent imaging reviewed      Recent Cultures (last 7 days):         Last 24 Hours Medication List:   Current Facility-Administered Medications   Medication Dose Route Frequency Provider Last Rate   • acetaminophen  650 mg Oral Q6H PRN Karen Spann PA-C     • ascorbic acid  500 mg Oral Daily Ibeth Betts PA-C     • aspirin  81 mg Oral Daily Ibeth Fields PA-C     • ciprofloxacin  400 mg Intravenous Q12H Vonne Cushing Wildrick, PA-C 400 mg (05/18/23 1021)   • cyanocobalamin  500 mcg Oral Daily Ibeth Fields PA-C     • docusate sodium  100 mg Oral BID PRN Ibeth Fields PA-C     • enoxaparin  40 mg Subcutaneous Daily Ibeth Betts PA-C • fish oil  1,000 mg Oral Daily Ibeth Fields PA-C     • insulin glargine  10 Units Subcutaneous HS Ibeth Fields PA-C     • insulin glargine  5 Units Subcutaneous QAM Ibeth Marrero PA-C     • insulin lispro  1-5 Units Subcutaneous Q6H Ibeth Marrero PA-C     • metroNIDAZOLE  500 mg Intravenous Q8H Radha Andrade PA-C 500 mg (05/18/23 1207)   • morphine injection  2 mg Intravenous Q4H PRN Ibeth Fields PA-C     • ondansetron  4 mg Intravenous Q6H PRN Ibeth Marrero PA-C     • pantoprazole  20 mg Oral Early Morning Ibeth Fields PA-C     • sodium chloride  125 mL/hr Intravenous Continuous Ibeth Fields PA-C 125 mL/hr (05/18/23 0038)        Today, Patient Was Seen By: Stephania Montano DO    **Please Note: This note may have been constructed using a voice recognition system  **

## 2023-05-18 NOTE — UTILIZATION REVIEW
Initial Clinical Review    Admission: Date/Time/Statement:   Admission Orders (From admission, onward)     Ordered        05/17/23 2351  INPATIENT ADMISSION  Once                      Orders Placed This Encounter   Procedures   • INPATIENT ADMISSION     Standing Status:   Standing     Number of Occurrences:   1     Order Specific Question:   Level of Care     Answer:   Med Surg [16]     Order Specific Question:   Estimated length of stay     Answer:   More than 2 Midnights     Order Specific Question:   Certification     Answer:   I certify that inpatient services are medically necessary for this patient for a duration of greater than two midnights  See H&P and MD Progress Notes for additional information about the patient's course of treatment  ED Arrival Information     Expected   -    Arrival   5/17/2023 19:19    Acuity   Urgent            Means of arrival   Walk-In    Escorted by   Family Member    Service   Hospitalist    Admission type   Emergency            Arrival complaint   right flank pain & sob           Chief Complaint   Patient presents with   • Abdominal Pain     RLQ pain starting 3 days ago with nausea but no vomiting  Patient reports pain radiates to right side of groin, denies any issues with urination, denies diarrhea  Initial Presentation: 63 y o  female to Ed from home w/ RLQ pain x3 days   harp pain that radiates to R groin  Has assoc nausea wo vomiting  States she feels a lump in the RLQ  Has intermittent spasms in the abdomen  Has had chills without fever  Reports 17 lb weight loss   PMHX DM   CT a/p: There is an area of severe inflammatory change in the mid abdomen surrounded by bowel loops  May be intraloop abscess however the lack of oral contrast limits evaluation  Wbc normal   Admitted IP status w/ enteritis , DM plan for GI consult , NPO , IVF , pain mngt   DM NPO , SSI , A1c       PE: abd tenderness RLQ     5/18 GI Consult   RLQ pain , enteritis    Plan to trial clear liq diet   Serial daily exams   Supportive care   Date: 5/18  Day 2: abd pain is still present   LLQ   Sharp spasms, nauseous   Cont supportive care , adv diet as zahra   + abd tenderness   ED Triage Vitals   Temperature Pulse Respirations Blood Pressure SpO2   05/17/23 1925 05/17/23 1925 05/17/23 1925 05/17/23 1925 05/17/23 1925   97 8 °F (36 6 °C) 78 18 124/65 98 %      Temp Source Heart Rate Source Patient Position - Orthostatic VS BP Location FiO2 (%)   05/17/23 1925 05/17/23 1925 05/17/23 1925 05/17/23 1925 --   Tympanic Monitor Sitting Left arm       Pain Score       05/17/23 2014       8          Wt Readings from Last 1 Encounters:   03/15/23 55 8 kg (123 lb)     Additional Vital Signs:   05/18/23 0600 -- 73 16 131/69 93 94 % -- --   05/18/23 0215 -- 63 15 140/65 94 96 % -- --   05/18/23 0100 -- 62 19 121/58 84 95 % None (Room air) Lying   05/18/23 0000 -- 76 18 131/67 93 97 % None (Room air) --   05/17/23 2300 -- 72 20 124/75 94 95 % None (Room air) Lying   05/17/23 2200 -- 67 -- 125/63 86 93 % None (Room air) Lying   05/17/23 2100 -- 86 20 123/72 93 96 % None (Room air) Lying   05/17/23 2011 -- -- -- -- -- -- None (        Pertinent Labs/Diagnostic Test Results:   CT abdomen pelvis with contrast   Final Result by Flako Grace MD (05/17 2228)      There is an area of severe inflammatory change in the mid abdomen surrounded by bowel loops  I suspect this is an intraloop abscess however the lack of oral contrast limits evaluation              I personally discussed this study with Arelis Hendrix on 5/17/2023 10:24 PM                Workstation performed: [id number]         CT abdomen pelvis wo contrast    (Results Pending)         Results from last 7 days   Lab Units 05/18/23  0506 05/17/23 2001   WBC Thousand/uL 5 41 5 88   HEMOGLOBIN g/dL 12 0 13 5   HEMATOCRIT % 36 6 40 4   PLATELETS Thousands/uL 179 197   NEUTROS ABS Thousands/µL 1 99 3 03         Results from last 7 days   Lab Units 05/18/23  0502 05/17/23 2001   SODIUM mmol/L 141 135   POTASSIUM mmol/L 3 6 4 3   CHLORIDE mmol/L 109* 102   CO2 mmol/L 27 28   ANION GAP mmol/L 5 5   BUN mg/dL 14 15   CREATININE mg/dL 0 53* 0 67   EGFR ml/min/1 73sq m 105 97   CALCIUM mg/dL 8 4 9 0     Results from last 7 days   Lab Units 05/18/23  0506 05/17/23 2001   AST U/L 10* 8*   ALT U/L 9 10   ALK PHOS U/L 77 94   TOTAL PROTEIN g/dL 6 1* 6 9   ALBUMIN g/dL 3 3* 3 7   TOTAL BILIRUBIN mg/dL 0 53 0 41     Results from last 7 days   Lab Units 05/18/23  0715 05/18/23  0019   POC GLUCOSE mg/dl 110 256*     Results from last 7 days   Lab Units 05/18/23  0506 05/17/23 2001   GLUCOSE RANDOM mg/dL 135 457*           Results from last 7 days   Lab Units 05/18/23  0706   PROCALCITONIN ng/ml <0 05       Results from last 7 days   Lab Units 05/17/23 2001   LIPASE u/L 22     Results from last 7 days   Lab Units 05/17/23 2006   CLARITY UA  Clear   COLOR UA  Colorless   SPEC GRAV UA  1 041*   PH UA  5 0   GLUCOSE UA mg/dl >=1000 (1%)*   KETONES UA mg/dl Negative   BLOOD UA  Negative   PROTEIN UA mg/dl Negative   NITRITE UA  Negative   BILIRUBIN UA  Negative   UROBILINOGEN UA (BE) mg/dl <2 0   LEUKOCYTES UA  Elevated glucose may cause decreased leukocyte values   See urine microscopic for UWBC result*   WBC UA /hpf None Seen   RBC UA /hpf 1-2   BACTERIA UA /hpf None Seen   EPITHELIAL CELLS WET PREP /hpf None Seen       ED Treatment:   Medication Administration from 05/17/2023 1919 to 05/18/2023 0850       Date/Time Order Dose Route Action     05/17/2023 2015 EDT sodium chloride 0 9 % bolus 1,000 mL 1,000 mL Intravenous New Bag     05/17/2023 2012 EDT ondansetron (ZOFRAN) injection 4 mg 4 mg Intravenous Given     05/17/2023 2014 EDT ketorolac (TORADOL) injection 15 mg 15 mg Intravenous Given     05/17/2023 2144 EDT morphine injection 4 mg 4 mg Intravenous Given     05/18/2023 0027 EDT insulin lispro (HumaLOG) 100 units/mL subcutaneous injection 5 Units 5 Units Subcutaneous Given 05/18/2023 0038 EDT sodium chloride 0 9 % infusion 125 mL/hr Intravenous New Bag     05/18/2023 0622 EDT pantoprazole (PROTONIX) EC tablet 20 mg 20 mg Oral Given        Past Medical History:   Diagnosis Date   • Arthritis    • Colonic polyp     resolved: 08/25/2017   • Diabetes mellitus ([facility] )    • Dry eye syndrome of bilateral lacrimal glands     last assessed: 05/15/2015   • GERD (gastroesophageal reflux disease)    • History of screening mammography     last assessed: 07/25/2016   • Hypertension    • Lyme disease    • Osteoarthritis     last assessed: 07/25/2016   • RA (rheumatoid arthritis) ([facility])    • Rheumatoid arteritis ([facility])    • Rheumatoid arthritis ([facility] )     osteoarthritis   • Stroke ([place] [address] )    • Symptomatic hypotension     last assessed: 03/22/2017   • Ventral hernia with obstruction and without gangrene     last assessed: 02/16/2017   • Vertigo      Present on Admission:  • Type II diabetes mellitus with hyperosmolarity, uncontrolled ([facility])      Admitting Diagnosis: Abdominal pain [R10 9]  Age/Sex: 63 y o  female  Admission Orders:  Scheduled Medications:  ascorbic acid, 500 mg, Oral, Daily  aspirin, 81 mg, Oral, Daily  cyanocobalamin, 500 mcg, Oral, Daily  enoxaparin, 40 mg, Subcutaneous, Daily  fish oil, 1,000 mg, Oral, Daily  insulin glargine, 10 Units, Subcutaneous, HS  insulin glargine, 5 Units, Subcutaneous, QAM  insulin lispro, 1-5 Units, Subcutaneous, Q6H  pantoprazole, 20 mg, Oral, Early Morning      Continuous IV Infusions:  sodium chloride, 125 mL/hr, Intravenous, Continuous      PRN Meds:  docusate sodium, 100 mg, Oral, BID PRN  morphine injection, 2 mg, Intravenous, Q4H PRN   5/18  x1  ondansetron, 4 mg, Intravenous, Q6H PRN   5/18  x1    Fingerstick ac and hs   I&O   Clear liq diet to NPO       IP CONSULT TO ACUTE CARE SURGERY  IP CONSULT TO GASTROENTEROLOGY    Network Utilization Review Department  ATTENTION: Please call with any questions or concerns to [phone] and carefully listen to the prompts so that you are directed to the right person  All voicemails are confidential   Stephy Yeung all requests for admission clinical reviews, approved or denied determinations and any other requests to dedicated fax number below belonging to the campus where the patient is receiving treatment   List of dedicated fax numbers for the Facilities:  [address] DENIALS (Administrative/Medical Necessity) [phone]   [address] (Maternity/NICU/Pediatrics) [phone]   914 Ana Headley [phone]   [facility] [phone]   [address] [address] [place] [address] [phone]   1559 [facility] 134 815 [facility] [phone]

## 2023-05-19 LAB
CARDIAC TROPONIN I PNL SERPL HS: <2 NG/L (ref 8–18)
GLUCOSE SERPL-MCNC: 107 MG/DL (ref 65–140)
GLUCOSE SERPL-MCNC: 132 MG/DL (ref 65–140)
GLUCOSE SERPL-MCNC: 134 MG/DL (ref 65–140)
GLUCOSE SERPL-MCNC: 144 MG/DL (ref 65–140)
GLUCOSE SERPL-MCNC: 157 MG/DL (ref 65–140)
GLUCOSE SERPL-MCNC: 194 MG/DL (ref 65–140)
GLUCOSE SERPL-MCNC: 98 MG/DL (ref 65–140)

## 2023-05-19 RX ORDER — POLYETHYLENE GLYCOL 3350 17 G/17G
17 POWDER, FOR SOLUTION ORAL DAILY
Status: DISCONTINUED | OUTPATIENT
Start: 2023-05-19 | End: 2023-05-20 | Stop reason: HOSPADM

## 2023-05-19 RX ORDER — SODIUM CHLORIDE 9 MG/ML
75 INJECTION, SOLUTION INTRAVENOUS CONTINUOUS
Status: DISCONTINUED | OUTPATIENT
Start: 2023-05-19 | End: 2023-05-20 | Stop reason: HOSPADM

## 2023-05-19 RX ORDER — DIPHENHYDRAMINE HYDROCHLORIDE 50 MG/ML
25 INJECTION INTRAMUSCULAR; INTRAVENOUS ONCE
Status: COMPLETED | OUTPATIENT
Start: 2023-05-19 | End: 2023-05-19

## 2023-05-19 RX ORDER — DOCUSATE SODIUM 100 MG/1
100 CAPSULE, LIQUID FILLED ORAL 2 TIMES DAILY
Status: DISCONTINUED | OUTPATIENT
Start: 2023-05-19 | End: 2023-05-20 | Stop reason: HOSPADM

## 2023-05-19 RX ORDER — MAGNESIUM HYDROXIDE/ALUMINUM HYDROXICE/SIMETHICONE 120; 1200; 1200 MG/30ML; MG/30ML; MG/30ML
30 SUSPENSION ORAL ONCE
Status: COMPLETED | OUTPATIENT
Start: 2023-05-19 | End: 2023-05-19

## 2023-05-19 RX ADMIN — SODIUM CHLORIDE 75 ML/HR: 0.9 INJECTION, SOLUTION INTRAVENOUS at 15:39

## 2023-05-19 RX ADMIN — OMEGA-3 FATTY ACIDS CAP 1000 MG 1000 MG: 1000 CAP at 08:37

## 2023-05-19 RX ADMIN — DIPHENHYDRAMINE HYDROCHLORIDE 25 MG: 50 INJECTION, SOLUTION INTRAMUSCULAR; INTRAVENOUS at 22:19

## 2023-05-19 RX ADMIN — INSULIN GLARGINE 5 UNITS: 100 INJECTION, SOLUTION SUBCUTANEOUS at 08:36

## 2023-05-19 RX ADMIN — ACETAMINOPHEN 650 MG: 325 TABLET ORAL at 20:54

## 2023-05-19 RX ADMIN — ASPIRIN 81 MG: 81 TABLET, COATED ORAL at 08:37

## 2023-05-19 RX ADMIN — OXYCODONE HYDROCHLORIDE AND ACETAMINOPHEN 500 MG: 500 TABLET ORAL at 08:37

## 2023-05-19 RX ADMIN — INSULIN LISPRO 1 UNITS: 100 INJECTION, SOLUTION INTRAVENOUS; SUBCUTANEOUS at 13:24

## 2023-05-19 RX ADMIN — ACETAMINOPHEN 650 MG: 325 TABLET ORAL at 08:37

## 2023-05-19 RX ADMIN — INSULIN GLARGINE 10 UNITS: 100 INJECTION, SOLUTION SUBCUTANEOUS at 21:48

## 2023-05-19 RX ADMIN — CYANOCOBALAMIN TAB 500 MCG 500 MCG: 500 TAB at 08:37

## 2023-05-19 RX ADMIN — PANTOPRAZOLE SODIUM 20 MG: 20 TABLET, DELAYED RELEASE ORAL at 06:32

## 2023-05-19 RX ADMIN — DOCUSATE SODIUM 100 MG: 100 CAPSULE, LIQUID FILLED ORAL at 17:29

## 2023-05-19 RX ADMIN — ENOXAPARIN SODIUM 40 MG: 40 INJECTION SUBCUTANEOUS at 08:36

## 2023-05-19 RX ADMIN — POLYETHYLENE GLYCOL 3350 17 G: 17 POWDER, FOR SOLUTION ORAL at 14:26

## 2023-05-19 RX ADMIN — DOCUSATE SODIUM 100 MG: 100 CAPSULE, LIQUID FILLED ORAL at 10:49

## 2023-05-19 RX ADMIN — INSULIN LISPRO 1 UNITS: 100 INJECTION, SOLUTION INTRAVENOUS; SUBCUTANEOUS at 00:55

## 2023-05-19 RX ADMIN — ALUMINUM HYDROXIDE, MAGNESIUM HYDROXIDE, AND DIMETHICONE 30 ML: 200; 20; 200 SUSPENSION ORAL at 22:19

## 2023-05-19 RX ADMIN — SODIUM CHLORIDE 75 ML/HR: 0.9 INJECTION, SOLUTION INTRAVENOUS at 10:50

## 2023-05-19 NOTE — PLAN OF CARE
Problem: MOBILITY - ADULT  Goal: Maintain or return to baseline ADL function  Description: INTERVENTIONS:  -  Assess patient's ability to carry out ADLs; assess patient's baseline for ADL function and identify physical deficits which impact ability to perform ADLs (bathing, care of mouth/teeth, toileting, grooming, dressing, etc )  - Assess/evaluate cause of self-care deficits   - Assess range of motion  - Assess patient's mobility; develop plan if impaired  - Assess patient's need for assistive devices and provide as appropriate  - Encourage maximum independence but intervene and supervise when necessary  - Involve family in performance of ADLs  - Assess for home care needs following discharge   - Consider OT consult to assist with ADL evaluation and planning for discharge  - Provide patient education as appropriate  Outcome: Progressing  Goal: Maintains/Returns to pre admission functional level  Description: INTERVENTIONS:  - Perform BMAT or MOVE assessment daily    - Set and communicate daily mobility goal to care team and patient/family/caregiver  - Collaborate with rehabilitation services on mobility goals if consulted  - Perform Range of Motion 3 times a day  - Reposition patient every 2 hours    - Dangle patient 3 times a day  - Stand patient 3 times a day  - Ambulate patient 3 times a day  - Out of bed to chair 3 times a day   - Out of bed for meals 3 times a day  - Out of bed for toileting  - Record patient progress and toleration of activity level   Outcome: Progressing     Problem: PAIN - ADULT  Goal: Verbalizes/displays adequate comfort level or baseline comfort level  Description: Interventions:  - Encourage patient to monitor pain and request assistance  - Assess pain using appropriate pain scale  - Administer analgesics based on type and severity of pain and evaluate response  - Implement non-pharmacological measures as appropriate and evaluate response  - Consider cultural and social influences on pain and pain management  - Notify physician/advanced practitioner if interventions unsuccessful or patient reports new pain  Outcome: Progressing     Problem: SAFETY ADULT  Goal: Maintain or return to baseline ADL function  Description: INTERVENTIONS:  -  Assess patient's ability to carry out ADLs; assess patient's baseline for ADL function and identify physical deficits which impact ability to perform ADLs (bathing, care of mouth/teeth, toileting, grooming, dressing, etc )  - Assess/evaluate cause of self-care deficits   - Assess range of motion  - Assess patient's mobility; develop plan if impaired  - Assess patient's need for assistive devices and provide as appropriate  - Encourage maximum independence but intervene and supervise when necessary  - Involve family in performance of ADLs  - Assess for home care needs following discharge   - Consider OT consult to assist with ADL evaluation and planning for discharge  - Provide patient education as appropriate  Outcome: Progressing  Goal: Maintains/Returns to pre admission functional level  Description: INTERVENTIONS:  - Perform BMAT or MOVE assessment daily    - Set and communicate daily mobility goal to care team and patient/family/caregiver  - Collaborate with rehabilitation services on mobility goals if consulted  - Perform Range of Motion 3 times a day  - Reposition patient every 3 hours    - Dangle patient 3 times a day  - Stand patient 3 times a day  - Ambulate patient 3 times a day  - Out of bed to chair 3 times a day   - Out of bed for meals 3 times a day  - Out of bed for toileting  - Record patient progress and toleration of activity level   Outcome: Progressing

## 2023-05-19 NOTE — ASSESSMENT & PLAN NOTE
CT a/p: There is an area of severe inflammatory change in the mid abdomen surrounded by bowel loops  May be intraloop abscess however the lack of oral contrast limits evaluation  · WBC is normal   · Repeat CT Redemonstrated severe inflammatory changes associated with multiple small bowel loops clustered within the right anterior abdomen  Although lack of intravenous contrast limits evaluation for rim-enhancing abscess, there is no definite focal drainable fluid collection identified  Small pelvic free fluid  · No evidence for bowel obstruction    · General surgery consult, following  · GI following  · Continue with supportive care, advance diet as tolerated  · Not meeting sepsis criteria, monitor off abx  · Continuous IV fluid for hydration and advance diet as tolerated

## 2023-05-19 NOTE — PROGRESS NOTES
"GI Progress Note - Dahiana Dewey 63 y o  female MRN: [id number]    Unit/Bed#: -01 Encounter: [id number]    Subjective: She had a large BM this morning and reports improvement in her abdominal pain  She is tolerating PO and is going to try advancement of her diet  She has taken multiple walks today  Objective:     Vitals: Blood pressure 119/67, pulse 59, temperature 98 6 °F (37 °C), resp  rate 18, height 5' 3\" (1 6 m), weight 59 6 kg (131 lb 6 3 oz), SpO2 94 %, not currently breastfeeding  ,Body mass index is 23 28 kg/m²  Intake/Output Summary (Last 24 hours) at 5/19/2023 1420  Last data filed at 5/18/2023 2107  Gross per 24 hour   Intake 2681 25 ml   Output --   Net 2681 25 ml       Physical Exam:     General Appearance: Alert, oriented x3, no acute distress  Lungs: Clear to auscultation bilaterally, no rales or rhonchi, no labored breathing/accessory muscle use  Heart: Regular rate and rhythm, S1, S2 normal, no murmur, click, rub or gallop  Abdomen: General distention, BS present, soft, mild generalized TTP  Extremities: No cyanosis, edema    Invasive Devices     Peripheral Intravenous Line  Duration           Peripheral IV 05/17/23 Left Antecubital 1 day                Lab Results:  Results from last 7 days   Lab Units 05/18/23  0506   WBC Thousand/uL 5 41   HEMOGLOBIN g/dL 12 0   HEMATOCRIT % 36 6   PLATELETS Thousands/uL 179   NEUTROS PCT % 37*   LYMPHS PCT % 52*   MONOS PCT % 9   EOS PCT % 2     Results from last 7 days   Lab Units 05/18/23  0506   POTASSIUM mmol/L 3 6   CHLORIDE mmol/L 109*   CO2 mmol/L 27   BUN mg/dL 14   CREATININE mg/dL 0 53*   CALCIUM mg/dL 8 4   ALK PHOS U/L 77   ALT U/L 9   AST U/L 10*         Results from last 7 days   Lab Units 05/17/23 2001   LIPASE u/L 22       Imaging Studies: I have personally reviewed pertinent imaging studies        CT abdomen pelvis wo contrast  Result Date: 5/18/2023  Impression: Redemonstrated severe inflammatory changes associated with multiple " small bowel loops clustered within the right anterior abdomen  Although lack of intravenous contrast limits evaluation for rim-enhancing abscess, there is no definite focal drainable fluid collection identified  Small pelvic free fluid  No evidence for bowel obstruction  Punctate nonobstructing right intrarenal calculus  Workstation performed: [id number]     CT abdomen pelvis with contrast  Result Date: 5/17/2023  Impression: There is an area of severe inflammatory change in the mid abdomen surrounded by bowel loops  I suspect this is an intraloop abscess however the lack of oral contrast limits evaluation  I personally discussed this study with Mara Muñoz on 5/17/2023 10:24 PM  Workstation performed: [id number]       Assessment and Plan:     RLQ Pain  Enteritis  - Presented with 3 days of RLQ pain, nausea, and pebble like bowel movements with CT imaging showed severe inflammatory changes of several small bowel loops initially with concern for intraloop abscess but follow up CT does not show any focal fluid collection  - No leukocytosis and no clinical evidence of obstruction with passing flatus, BMs, and normoactive BS  - Clinically improved today after a large bowel movement, continues to have normoactive bowel sounds and abdominal pain has increased  - Agree with advancement of diet, discussed low fiber diet over the next few weeks as her enteritis heals  - Supportive care  - Will add miralax 17 g daily for her constipation  - Will arrange outpatient GI follow up and schedule colonoscopy as outpatient   - Discussed with surgical team, she has prior CT imaging in 2018 that looks similar in terms of the inflammatory changes in the small bowel so would recommend colonoscopy as outpatient to re-eval colon and TI to rule out inflammatory bowel disease      The patient will be seen by Dr Leyla Wright   GI Will sign off

## 2023-05-19 NOTE — PROGRESS NOTES
[facility]  Progress Note  Name: Pierre Macdonald I  MRN: [id number]  Unit/Bed#: -01 I Date of Admission: 5/17/2023   Date of Service: 5/19/2023 I Hospital Day: 2    Assessment/Plan   * Enteritis  Assessment & Plan  CT a/p: There is an area of severe inflammatory change in the mid abdomen surrounded by bowel loops  May be intraloop abscess however the lack of oral contrast limits evaluation  · WBC is normal   · Repeat CT Redemonstrated severe inflammatory changes associated with multiple small bowel loops clustered within the right anterior abdomen  Although lack of intravenous contrast limits evaluation for rim-enhancing abscess, there is no definite focal drainable fluid collection identified  Small pelvic free fluid  · No evidence for bowel obstruction  · General surgery consult, following  · GI following  · Continue with supportive care, advance diet as tolerated  · Not meeting sepsis criteria, monitor off abx  · Continuous IV fluid for hydration and advance diet as tolerated    Type II diabetes mellitus with hyperosmolarity, uncontrolled (HCC)  Assessment & Plan    Lab Results   Component Value Date    HGBA1C 12 9 (H) 05/17/2023     · Poorly controlled  Patient normally on Lantus 10 units every morning, 15 units every afternoon  Dose reduce while on clear liquid diet, advance as tolerated  Humalog SSI q6h  Monitor for hypoglycemia   · CCD         VTE Pharmacologic Prophylaxis:   Pharmacologic: Enoxaparin (Lovenox)  Mechanical VTE Prophylaxis in Place: Yes    Review of Systems:    Review of Systems   Constitutional: Negative for chills and fever  HENT: Negative for ear pain and sore throat  Eyes: Negative for pain and visual disturbance  Respiratory: Negative for cough and shortness of breath  Cardiovascular: Negative for chest pain and palpitations  Gastrointestinal: Negative for abdominal pain and vomiting  Genitourinary: Negative for dysuria and hematuria  Musculoskeletal: Negative for arthralgias and back pain  Skin: Negative for color change and rash  Neurological: Negative for seizures and syncope  All other systems reviewed and are negative  Past Medical and Surgical History:     Past Medical History:   Diagnosis Date   • Arthritis    • Colonic polyp     resolved: 08/25/2017   • Diabetes mellitus ([address] )    • Dry eye syndrome of bilateral lacrimal glands     last assessed: 05/15/2015   • GERD (gastroesophageal reflux disease)    • History of screening mammography     last assessed: 07/25/2016   • Hypertension    • Lyme disease    • Osteoarthritis     last assessed: 07/25/2016   • RA (rheumatoid arthritis) ([address] )    • Rheumatoid arteritis (HCC)    • Rheumatoid arthritis ([address] )     osteoarthritis   • Stroke ([facility] )    • Symptomatic hypotension     last assessed: 03/22/2017   • Ventral hernia with obstruction and without gangrene     last assessed: 02/16/2017   • Vertigo        Past Surgical History:   Procedure Laterality Date   • BACK SURGERY     • CYST REMOVAL     • EGD AND COLONOSCOPY N/A 2/3/2017    Procedure: COLONOSCOPY; POSSIBLE POLYPECTOMY; POSSIBLE BIOPSY AND EGD ;  Surgeon: Teresa Barrera MD;  Location: [facility]; Service:    • HERNIA REPAIR     • LIPOMA RESECTION     • OTHER SURGICAL HISTORY      excision of sebaceous cst to back   • TUBAL LIGATION     • VENTRAL HERNIA REPAIR N/A 3/17/2017    Procedure: LAPAROSCOPIC VENTRAL HERNIA REPAIR WITH MESH ;  Surgeon: Teresa Barrera MD;  Location: [facility];  Service:          Discussions with Specialists or Other Care Team Provider: Nursing, CM    Education and Discussions with Family / Patient: Patient and family at bedside    Time Spent for Care: 39  More than 50% of total time spent on counseling and coordination of care as described above      Current Length of Stay: 2 day(s)    Current Patient Status: Inpatient   Certification Statement: The patient will continue to require additional inpatient hospital stay due to IV fluid    Discharge Plan: likely Tomorrow if was able to tolerate full diet     Code Status: Level 1 - Full Code      Subjective:       Objective:     Vitals:   Temp (24hrs), Av 3 °F (36 8 °C), Min:98 °F (36 7 °C), Max:98 6 °F (37 °C)    Temp:  [98 °F (36 7 °C)-98 6 °F (37 °C)] 98 6 °F (37 °C)  HR:  [55-69] 59  Resp:  [15-19] 18  BP: (116-124)/(60-77) 119/67  SpO2:  [94 %-96 %] 94 %  Body mass index is 23 28 kg/m²  Input and Output Summary (last 24 hours): Intake/Output Summary (Last 24 hours) at 2023 1245  Last data filed at 2023 2107  Gross per 24 hour   Intake 2681 25 ml   Output --   Net 2681 25 ml       Physical Exam:     Physical Exam  Vitals and nursing note reviewed  Constitutional:       General: She is not in acute distress  Appearance: She is well-developed  She is not ill-appearing or diaphoretic  HENT:      Head: Normocephalic and atraumatic  Mouth/Throat:      Mouth: Mucous membranes are moist       Pharynx: Oropharynx is clear  Eyes:      General: No scleral icterus  Conjunctiva/sclera: Conjunctivae normal    Cardiovascular:      Rate and Rhythm: Normal rate and regular rhythm  Heart sounds: No murmur heard  Pulmonary:      Effort: Pulmonary effort is normal  No respiratory distress  Breath sounds: Normal breath sounds  No wheezing or rales  Abdominal:      General: Bowel sounds are normal       Palpations: Abdomen is soft  Tenderness: There is no abdominal tenderness  Musculoskeletal:         General: No swelling  Cervical back: Normal range of motion and neck supple  Right lower leg: No edema  Left lower leg: No edema  Skin:     General: Skin is warm and dry  Capillary Refill: Capillary refill takes less than 2 seconds  Neurological:      General: No focal deficit present  Mental Status: She is alert and oriented to person, place, and time     Psychiatric:         Mood and Affect: Mood normal          Behavior: Behavior normal            Additional Data:     Labs:    Results from last 7 days   Lab Units 05/18/23  0506   WBC Thousand/uL 5 41   HEMOGLOBIN g/dL 12 0   HEMATOCRIT % 36 6   PLATELETS Thousands/uL 179   NEUTROS PCT % 37*   LYMPHS PCT % 52*   MONOS PCT % 9   EOS PCT % 2     Results from last 7 days   Lab Units 05/18/23  0506   SODIUM mmol/L 141   POTASSIUM mmol/L 3 6   CHLORIDE mmol/L 109*   CO2 mmol/L 27   BUN mg/dL 14   CREATININE mg/dL 0 53*   ANION GAP mmol/L 5   CALCIUM mg/dL 8 4   ALBUMIN g/dL 3 3*   TOTAL BILIRUBIN mg/dL 0 53   ALK PHOS U/L 77   ALT U/L 9   AST U/L 10*   GLUCOSE RANDOM mg/dL 135         Results from last 7 days   Lab Units 05/19/23  1132 05/19/23  0718 05/19/23  0626 05/19/23  0054 05/18/23  2036 05/18/23  1218 05/18/23  0715 05/18/23  0019   POC GLUCOSE mg/dl 194* 134 132 157* 155* 171* 110 256*     Results from last 7 days   Lab Units 05/17/23 2001   HEMOGLOBIN A1C % 12 9*     Results from last 7 days   Lab Units 05/18/23  0706   PROCALCITONIN ng/ml <0 05           * I Have Reviewed All Lab Data Listed Above  * Additional Pertinent Lab Tests Reviewed:  [address] Admission Reviewed    Imaging:    Imaging Reports Reviewed Today Include: none  Imaging Personally Reviewed by Myself Includes:  none    Recent Cultures (last 7 days):           Last 24 Hours Medication List:   Current Facility-Administered Medications   Medication Dose Route Frequency Provider Last Rate   • acetaminophen  650 mg Oral Q6H PRN Karen Spann PA-C     • ascorbic acid  500 mg Oral Daily Ibeth Betts PA-C     • aspirin  81 mg Oral Daily Ibeth Fields PA-C     • cyanocobalamin  500 mcg Oral Daily Ibeth Fields PA-C     • docusate sodium  100 mg Oral BID Agnieszka Mcpherson MD     • enoxaparin  40 mg Subcutaneous Daily Ibeth Fields PA-C     • fish oil  1,000 mg Oral Daily Ibeth Fields PA-C     • insulin glargine  10 Units Subcutaneous HS Ibeth Fields PA-C     • insulin glargine  5 Units Subcutaneous QAM Ibeth Fields PA-C     • insulin lispro  1-5 Units Subcutaneous Q6H Ibeth Fields PA-C     • morphine injection  2 mg Intravenous Q4H PRN Ibeth Fields PA-C     • ondansetron  4 mg Intravenous Q6H PRN Ibeth Fields PA-C     • pantoprazole  20 mg Oral Early Morning Ibeth Fields PA-C     • sodium chloride  75 mL/hr Intravenous Continuous Agnieszka Mcpherson MD 75 mL/hr (05/19/23 1050)        Today, Patient Was Seen By: Delilah Tierney MD    ** Please Note: Dictation voice to text software may have been used in the creation of this document   **

## 2023-05-19 NOTE — ASSESSMENT & PLAN NOTE
Lab Results   Component Value Date    HGBA1C 12 9 (H) 05/17/2023     · Poorly controlled  Patient normally on Lantus 10 units every morning, 15 units every afternoon   Dose reduce while on clear liquid diet, advance as tolerated  Humalog SSI q6h  Monitor for hypoglycemia   · CCD

## 2023-05-20 VITALS
RESPIRATION RATE: 17 BRPM | OXYGEN SATURATION: 97 % | SYSTOLIC BLOOD PRESSURE: 111 MMHG | BODY MASS INDEX: 23.28 KG/M2 | HEART RATE: 53 BPM | HEIGHT: 63 IN | WEIGHT: 131.39 LBS | TEMPERATURE: 98.7 F | DIASTOLIC BLOOD PRESSURE: 62 MMHG

## 2023-05-20 LAB
ANION GAP SERPL CALCULATED.3IONS-SCNC: 3 MMOL/L (ref 4–13)
ATRIAL RATE: 50 BPM
ATRIAL RATE: 52 BPM
BUN SERPL-MCNC: 4 MG/DL (ref 5–25)
CALCIUM SERPL-MCNC: 8.5 MG/DL (ref 8.4–10.2)
CHLORIDE SERPL-SCNC: 112 MMOL/L (ref 96–108)
CO2 SERPL-SCNC: 26 MMOL/L (ref 21–32)
CREAT SERPL-MCNC: 0.46 MG/DL (ref 0.6–1.3)
GFR SERPL CREATININE-BSD FRML MDRD: 110 ML/MIN/1.73SQ M
GLUCOSE SERPL-MCNC: 86 MG/DL (ref 65–140)
GLUCOSE SERPL-MCNC: 93 MG/DL (ref 65–140)
GLUCOSE SERPL-MCNC: 97 MG/DL (ref 65–140)
P AXIS: 44 DEGREES
P AXIS: 62 DEGREES
POTASSIUM SERPL-SCNC: 3.5 MMOL/L (ref 3.5–5.3)
PR INTERVAL: 142 MS
PR INTERVAL: 162 MS
QRS AXIS: 26 DEGREES
QRS AXIS: 37 DEGREES
QRSD INTERVAL: 82 MS
QRSD INTERVAL: 84 MS
QT INTERVAL: 438 MS
QT INTERVAL: 460 MS
QTC INTERVAL: 399 MS
QTC INTERVAL: 427 MS
SODIUM SERPL-SCNC: 141 MMOL/L (ref 135–147)
T WAVE AXIS: 37 DEGREES
T WAVE AXIS: 54 DEGREES
VENTRICULAR RATE: 50 BPM
VENTRICULAR RATE: 52 BPM

## 2023-05-20 RX ADMIN — ENOXAPARIN SODIUM 40 MG: 40 INJECTION SUBCUTANEOUS at 08:20

## 2023-05-20 RX ADMIN — DOCUSATE SODIUM 100 MG: 100 CAPSULE, LIQUID FILLED ORAL at 08:20

## 2023-05-20 RX ADMIN — POLYETHYLENE GLYCOL 3350 17 G: 17 POWDER, FOR SOLUTION ORAL at 08:20

## 2023-05-20 RX ADMIN — OMEGA-3 FATTY ACIDS CAP 1000 MG 1000 MG: 1000 CAP at 08:20

## 2023-05-20 RX ADMIN — OXYCODONE HYDROCHLORIDE AND ACETAMINOPHEN 500 MG: 500 TABLET ORAL at 08:20

## 2023-05-20 RX ADMIN — CYANOCOBALAMIN TAB 500 MCG 500 MCG: 500 TAB at 08:20

## 2023-05-20 RX ADMIN — ASPIRIN 81 MG: 81 TABLET, COATED ORAL at 08:20

## 2023-05-20 RX ADMIN — PANTOPRAZOLE SODIUM 20 MG: 20 TABLET, DELAYED RELEASE ORAL at 05:55

## 2023-05-20 NOTE — PLAN OF CARE
Problem: Potential for Falls  Goal: Patient will remain free of falls  Description: INTERVENTIONS:  - Educate patient/family on patient safety including physical limitations  - Instruct patient to call for assistance with activity   - Consult OT/PT to assist with strengthening/mobility   - Keep Call bell within reach  - Keep bed low and locked with side rails adjusted as appropriate  - Keep care items and personal belongings within reach  - Initiate and maintain comfort rounds  - Make Fall Risk Sign visible to staff  - Offer Toileting every 2 Hours, in advance of need  - Initiate/Maintain bed/chair alarm  - Apply yellow socks and bracelet for high fall risk patients  - Consider moving patient to room near nurses station  Outcome: Progressing     Problem: PAIN - ADULT  Goal: Verbalizes/displays adequate comfort level or baseline comfort level  Description: Interventions:  - Encourage patient to monitor pain and request assistance  - Assess pain using appropriate pain scale  - Administer analgesics based on type and severity of pain and evaluate response  - Implement non-pharmacological measures as appropriate and evaluate response  - Consider cultural and social influences on pain and pain management  - Notify physician/advanced practitioner if interventions unsuccessful or patient reports new pain  Outcome: Progressing     Problem: SAFETY ADULT  Goal: Patient will remain free of falls  Description: INTERVENTIONS:  - Educate patient/family on patient safety including physical limitations  - Instruct patient to call for assistance with activity   - Consult OT/PT to assist with strengthening/mobility   - Keep Call bell within reach  - Keep bed low and locked with side rails adjusted as appropriate  - Keep care items and personal belongings within reach  - Initiate and maintain comfort rounds  - Make Fall Risk Sign visible to staff  - Offer Toileting every 2 Hours, in advance of need  - Initiate/Maintain bed/chair alarm  - Apply yellow socks and bracelet for high fall risk patients  - Consider moving patient to room near nurses station  Outcome: Progressing

## 2023-05-20 NOTE — DISCHARGE SUMMARY
[facility]  Discharge- Jarocho Hre 1965, 63 y o  female MRN: [id number]  Unit/Bed#: -01 Encounter: [id number]  Primary Care Provider: Rahat Mcleod DO   Date and time admitted to hospital: 5/17/2023  7:28 PM    * Enteritis  Assessment & Plan  CT a/p: There is an area of severe inflammatory change in the mid abdomen surrounded by bowel loops  May be intraloop abscess however the lack of oral contrast limits evaluation  · WBC is normal   · Repeat CT Redemonstrated severe inflammatory changes associated with multiple small bowel loops clustered within the right anterior abdomen  Although lack of intravenous contrast limits evaluation for rim-enhancing abscess, there is no definite focal drainable fluid collection identified  Small pelvic free fluid  · No evidence for bowel obstruction  · General surgery consult, following  · GI following  · Continue with supportive care, advance diet as tolerated  · Not meeting sepsis criteria, monitor off abx  · Tolerated diet well    Type II diabetes mellitus with hyperosmolarity, uncontrolled ([address] )  Assessment & Plan    Lab Results   Component Value Date    HGBA1C 12 9 (H) 05/17/2023     · Continue home medications    Discharging Physician / Practitioner: Luis Miguel Her MD  PCP: Rahat Mcleod DO  Admission Date:   Admission Orders (From admission, onward)     Ordered        05/17/23 2351  INPATIENT ADMISSION  Once                      Discharge Date: 05/20/23    Medical Problems     Resolved Problems  Date Reviewed: 5/20/2023   None         Consultations During Hospital Stay:  · Gastroenterology  · General surgery        Reason for Admission: Abdominal pain    Hospital Course:     Jarocho Her is a 63 y o  female patient who originally presented to the hospital on 5/17/2023 due to laron pain with nausea and vomiting thought to be secondary to enteritis    Evaluated by gastroenterology and general surgery teams and cleared "for discharge  Diet was advanced and patient tolerated well with improvement of symptoms  Patient remained hemodynamically stable  Patient is medically stable for discharge with outpatient follow-up with PCP and gastroenterology in the office  Please see above list of diagnoses and related plan for additional information  Condition at Discharge: stable     Discharge Day Visit / Exam:     Subjective:    Vitals: Blood Pressure: 111/62 (05/20/23 0705)  Pulse: (!) 53 (05/20/23 0705)  Temperature: 98 7 °F (37 1 °C) (05/20/23 0705)  Temp Source: Oral (05/18/23 2020)  Respirations: 17 (05/20/23 0705)  Height: 5' 3\" (160 cm) (05/18/23 2020)  Weight - Scale: 59 6 kg (131 lb 6 3 oz) (05/18/23 2020)  SpO2: 97 % (05/20/23 0900)  Exam:   Physical Exam  Vitals and nursing note reviewed  Constitutional:       General: She is not in acute distress  Appearance: She is well-developed  HENT:      Head: Normocephalic and atraumatic  Eyes:      Conjunctiva/sclera: Conjunctivae normal    Cardiovascular:      Rate and Rhythm: Normal rate and regular rhythm  Heart sounds: No murmur heard  Pulmonary:      Effort: Pulmonary effort is normal  No respiratory distress  Breath sounds: Normal breath sounds  Abdominal:      Palpations: Abdomen is soft  Tenderness: There is no abdominal tenderness  Musculoskeletal:         General: No swelling  Cervical back: Neck supple  Skin:     General: Skin is warm and dry  Capillary Refill: Capillary refill takes less than 2 seconds  Neurological:      Mental Status: She is alert  Psychiatric:         Mood and Affect: Mood normal          Discharge instructions/Information to patient and family:   See after visit summary for information provided to patient and family  Provisions for Follow-Up Care:  See after visit summary for information related to follow-up care and any pertinent home health orders        Disposition:     Home        Planned " Readmission: no     Discharge Statement:  I spent 65 minutes discharging the patient  This time was spent on the day of discharge  I had direct contact with the patient on the day of discharge  Greater than 50% of the total time was spent examining patient, answering all patient questions, arranging and discussing plan of care with patient as well as directly providing post-discharge instructions  Additional time then spent on discharge activities  Discharge Medications:  See after visit summary for reconciled discharge medications provided to patient and family        ** Please Note: This note has been constructed using a voice recognition system **

## 2023-05-20 NOTE — NURSING NOTE
Discharge documents given to the patient  Patient demonstrated understanding of given instructions  IV removed  Lione Cleaves in place  Patient went home, accompanied by the grandson and   Patient satisfied with hospital stay and its staff

## 2023-05-20 NOTE — CASE MANAGEMENT
Case Management Discharge Planning Note    Patient name Jose Angel West  Location /-31 MRN [id number]  : 1965 Date 2023       Current Admission Date: 2023  Current Admission Diagnosis:Enteritis   Patient Active Problem List    Diagnosis Date Noted   • Enteritis 2023   • Neck pain 2023   • Type II diabetes mellitus with hyperosmolarity, uncontrolled ([address] ) 2022   • Vaginal bleeding 2022   • Grade I hemorrhoids 2022   • Gross hematuria 2022   • Thrush of mouth and esophagus ([address] ) 2021   • Post-COVID chronic fatigue 2021   • COVID-19 virus infection 2021   • BRBPR (bright red blood per rectum) 2021   • Hemorrhoid 2021   • Current use of insulin ([address] ) 2021   • Uncontrolled type 2 diabetes mellitus with hyperglycemia ([address] ) 2021   • Acute midline thoracic back pain 2020   • Abscess of upper back excluding scapular region 2020   • Toe pain, right 2019   • Mixed hyperlipidemia 2019   • Annual physical exam 2019   • RA (rheumatoid arthritis) ([address] ) 2019   • Essential hypertension 2019   • Stroke ([address] ) 2019   • Vertigo 2019   • Osteoarthritis 2019   • Dyslipidemia 2017   • Ventral hernia with obstruction and without gangrene    • Umbilical hernia without obstruction and without gangrene 2017   • Gastritis 2017   • GERD (gastroesophageal reflux disease) 2017   • Colon polyp 2017      LOS (days): 3  Geometric Mean LOS (GMLOS) (days):   Days to GMLOS:     OBJECTIVE:  Risk of Unplanned Readmission Score: 8 16         Current admission status: Inpatient   Preferred Pharmacy:   [facility] [address], [address] [facility]  12   [address] [place] [address]  Phone: [phone] Fax: [phone]    [address] [place], [place] - [address] [place] [address] [address] [place] [address]  Phone: [phone] Fax: [phone]    Primary Care Provider: Kelle Shah DO    Primary Insurance: BLUE CROSS  Secondary Insurance:     DISCHARGE DETAILS:                                [address]         Is the patient interested in [place] at discharge?: Yes  [address] requested[de-identified] Nursing, Physical [address] Name[de-identified] [address] Provider[de-identified] PCP  Home Health Services Needed[de-identified] Evaluate Functional Status and Safety, Strengthening/Theraputic Exercises to Improve Function, Gait/ADL Training  Homebound Criteria Met[de-identified] Uses an Assist Device (i e  cane, walker, etc), Requires the Assistance of Another Person for Safe Ambulation or to Leave the Home  Supporting Clincal Findings[de-identified] Fatigues Easliy in [employer], Limited Endurance         Other Referral/Resources/Interventions Provided:  Interventions: [facility]  Referral Comments: CM contacted [facility] and they will accept for Monday    [facility] accepted and reserved    Would you like to participate in our [address] service program?  : No - Declined    [facility] Team [facility]  Discharge Destination Plan[de-identified] Home with [place] at Discharge : Family

## 2023-05-20 NOTE — ASSESSMENT & PLAN NOTE
CT a/p: There is an area of severe inflammatory change in the mid abdomen surrounded by bowel loops  May be intraloop abscess however the lack of oral contrast limits evaluation  · WBC is normal   · Repeat CT Redemonstrated severe inflammatory changes associated with multiple small bowel loops clustered within the right anterior abdomen  Although lack of intravenous contrast limits evaluation for rim-enhancing abscess, there is no definite focal drainable fluid collection identified  Small pelvic free fluid  · No evidence for bowel obstruction    · General surgery consult, following  · GI following  · Continue with supportive care, advance diet as tolerated  · Not meeting sepsis criteria, monitor off abx  · Tolerated diet well

## 2023-05-22 ENCOUNTER — TELEPHONE (OUTPATIENT)
Dept: GASTROENTEROLOGY | Facility: CLINIC | Age: 58
End: 2023-05-22

## 2023-05-22 NOTE — TELEPHONE ENCOUNTER
----- Message from Dayanna Engel PA-C sent at 5/19/2023  2:26 PM EDT -----  This patient needs a hospital follow up office visit in the next 4-6 weeks or next soonest available with any PA  Thanks!

## 2023-05-23 ENCOUNTER — TRANSITIONAL CARE MANAGEMENT (OUTPATIENT)
Dept: FAMILY MEDICINE CLINIC | Facility: CLINIC | Age: 58
End: 2023-05-23

## 2023-05-26 ENCOUNTER — HOSPITAL ENCOUNTER (EMERGENCY)
Facility: HOSPITAL | Age: 58
Discharge: HOME | End: 2023-05-26
Attending: EMERGENCY MEDICINE | Admitting: EMERGENCY MEDICINE
Payer: COMMERCIAL

## 2023-05-26 VITALS
RESPIRATION RATE: 18 BRPM | TEMPERATURE: 98.2 F | SYSTOLIC BLOOD PRESSURE: 152 MMHG | DIASTOLIC BLOOD PRESSURE: 65 MMHG | OXYGEN SATURATION: 99 % | HEART RATE: 66 BPM

## 2023-05-26 DIAGNOSIS — T80.89XA BRUISING AT INJECTION SITE: Primary | ICD-10-CM

## 2023-05-26 PROCEDURE — 99283 EMERGENCY DEPT VISIT LOW MDM: CPT

## 2023-05-27 NOTE — ED PROVIDER NOTES
History  Chief Complaint   Patient presents with   • Bleeding/Bruising     Pt arrives ambulatory with a c/o bruising to lower stomach  Pt was recently admitted as a pt here and received heparin shots in her stomach  Denies other complaints  31-year-old female presents with bruising to her lower stomach, right-sided  She is minimally tender in this region  This is the site of heparin shots while she is in the hospital   Bruising appears consistent with heparin shot site  Patient has no other complaints and is discharged in stable condition with supportive care          Prior to Admission Medications   Prescriptions Last Dose Informant Patient Reported? Taking?    Biotin w/ Vitamins C & E (HAIR/SKIN/NAILS PO)   Yes No   Sig: Take 2 capsules by mouth in the morning   Blood Glucose Monitoring Suppl (ONE TOUCH ULTRA 2) w/Device KIT   No No   Sig: Use 4 (four) times a day   Insulin Pen Needle (BD Pen Needle Ignacia 2nd Gen) 32G X 4 MM MISC   No No   Sig: Inject under the skin daily   Multiple Vitamin (MULTI-VITAMIN DAILY PO)   Yes No   Sig: Take by mouth   Omega-3 Fatty Acids (fish oil) 1,000 mg   Yes No   Sig: Take 1,000 mg by mouth daily   OneTouch Delica Lancets 52Q MISC   No No   Sig: Use 4 (four) times a day Test blood sugar 3 to 4 times daily   ascorbic acid (VITAMIN C) 500 mg tablet  Self No No   Sig: Take 1 tablet (500 mg total) by mouth daily   aspirin (ECOTRIN LOW STRENGTH) 81 mg EC tablet  Self Yes No   Sig: Take 81 mg by mouth daily   calcium carbonate (TUMS) 500 mg chewable tablet  Self Yes No   Sig: Chew 1 tablet daily PRN   chlorzoxazone (PARAFON FORTE) 500 mg tablet   No No   Sig: Take 1 tablet (500 mg total) by mouth 4 (four) times a day as needed for muscle spasms   cyanocobalamin (VITAMIN B-12) 500 MCG tablet  Self Yes No   Sig: Take 500 mcg by mouth daily   dimenhyDRINATE (DRAMAMINE) 50 mg tablet  Self Yes No   Sig: Take 50 mg by mouth 2 (two) times a day     docusate sodium (COLACE) 100 mg capsule Self Yes No   Sig: Take 100 mg by mouth 2 (two) times a day as needed     glucose blood (OneTouch Ultra) test strip   No No   Sig: Test blood sugar 3 to 4 times daily   hydrocortisone (ANUSOL-HC) 2 5 % rectal cream  Self No No   Sig: Apply topically 2 (two) times a day   Patient taking differently: Apply topically 2 (two) times a day PRN   insulin aspart (NovoLOG FlexPen) 100 UNIT/ML injection pen   No No   Sig: Inject 10 Units under the skin 3 (three) times a day with meals   insulin degludec Claudia Skeeters FlexTouch) 200 units/mL CONCENTRATED U-200 injection pen   No No   Sig: Inject 10 units at 11:00 a m   And 15 units at evening meal   insulin lispro (HumaLOG KwikPen) 100 units/mL injection pen   No No   Sig: Inject 10 Units under the skin 3 (three) times a day with meals   meloxicam (Mobic) 15 mg tablet   No No   Sig: Take 1 tablet (15 mg total) by mouth daily   Patient not taking: Reported on 5/18/2023   metFORMIN (GLUCOPHAGE) 1000 MG tablet   No No   Sig: Take 1 tablet (1,000 mg total) by mouth 2 (two) times a day with meals   methylcellulose (CITRUCEL) 500 mg tablet  Self Yes No   Sig: Take 1,000 mg by mouth daily     Patient not taking: Reported on 1/5/2023   omeprazole (PriLOSEC) 20 mg delayed release capsule  Self No No   Sig: Take 1 capsule (20 mg total) by mouth daily before breakfast      Facility-Administered Medications: None       Past Medical History:   Diagnosis Date   • Arthritis    • Colonic polyp     resolved: 08/25/2017   • Diabetes mellitus ([facility] )    • Dry eye syndrome of bilateral lacrimal glands     last assessed: 05/15/2015   • GERD (gastroesophageal reflux disease)    • History of screening mammography     last assessed: 07/25/2016   • Hypertension    • Lyme disease    • Osteoarthritis     last assessed: 07/25/2016   • RA (rheumatoid arthritis) ([facility])    • Rheumatoid arteritis ([facility] )    • Rheumatoid arthritis ([facility])     osteoarthritis   • Stroke ([facility] )    • Symptomatic hypotension     last assessed: 2017   • Ventral hernia with obstruction and without gangrene     last assessed: 2017   • Vertigo        Past Surgical History:   Procedure Laterality Date   • BACK SURGERY     • CYST REMOVAL     • EGD AND COLONOSCOPY N/A 2/3/2017    Procedure: COLONOSCOPY; POSSIBLE POLYPECTOMY; POSSIBLE BIOPSY AND EGD ;  Surgeon: Kailyn Barth MD;  Location: [facility]; Service:    • HERNIA REPAIR     • LIPOMA RESECTION     • OTHER SURGICAL HISTORY      excision of sebaceous cst to back   • TUBAL LIGATION     • VENTRAL HERNIA REPAIR N/A 3/17/2017    Procedure: LAPAROSCOPIC VENTRAL HERNIA REPAIR WITH MESH ;  Surgeon: Kailyn Barth MD;  Location: [facility];  Service:        Family History   Problem Relation Age of Onset   • COPD Mother         COPD/Emphysema   • Diabetes Mother    • Heart disease Father    • Stroke Father    • COPD Father         COPD/Emphysema   • Heart attack Father    • Diabetes Paternal Grandmother    • Breast cancer Paternal Aunt    • Depression Daughter      I have reviewed and agree with the history as documented  E-Cigarette/Vaping   • E-Cigarette Use Never User      E-Cigarette/Vaping Substances   • Nicotine No    • THC No    • CBD No    • Flavoring No    • Other No    • Unknown No      Social History     Tobacco Use   • Smoking status: Former     Packs/day: 0 25     Years: 2 00     Total pack years: 0 50     Types: Cigarettes     Start date: 81     Quit date:      Years since quittin 4   • Smokeless tobacco: Never   Vaping Use   • Vaping Use: Never used   Substance Use Topics   • Alcohol use: Not Currently     Comment: Stopped drinking alcohol    • Drug use: No       Review of Systems   Skin:        Bruising to abdominal wall, right lower quadrant   All other systems reviewed and are negative  Physical Exam  Physical Exam  Vitals reviewed  Constitutional:       General: She is not in acute distress  Appearance: She is well-developed  She is not diaphoretic     HENT: Head: Normocephalic and atraumatic  Eyes:      Conjunctiva/sclera: Conjunctivae normal    Pulmonary:      Effort: Pulmonary effort is normal  No respiratory distress  Musculoskeletal:         General: Normal range of motion  Cervical back: Normal range of motion  Skin:     General: Skin is warm and dry  Coloration: Skin is not pale  Findings: Bruising (RLQ skin appears consistent w a shot from heparin injection) present  Neurological:      Mental Status: She is alert and oriented to person, place, and time  Cranial Nerves: No cranial nerve deficit  Psychiatric:         Behavior: Behavior normal          Vital Signs  ED Triage Vitals [05/26/23 2223]   Temperature Pulse Respirations Blood Pressure SpO2   98 2 °F (36 8 °C) 66 18 152/65 99 %      Temp Source Heart Rate Source Patient Position - Orthostatic VS BP Location FiO2 (%)   Oral Monitor Sitting Left arm --      Pain Score       --           Vitals:    05/26/23 2223   BP: 152/65   Pulse: 66   Patient Position - Orthostatic VS: Sitting         Visual Acuity      ED Medications  Medications - No data to display    Diagnostic Studies  Results Reviewed     None                 No orders to display              Procedures  Procedures         ED Course                                             Medical Decision Making  Bruising at the site of heparin injection  Disposition  Final diagnoses:   Bruising at injection site     Time reflects when diagnosis was documented in both MDM as applicable and the Disposition within this note     Time User Action Codes Description Comment    5/26/2023 10:48 PM Maya Cole Add [[id number]] Bruising at injection site       ED Disposition     ED Disposition   Discharge    Condition   Stable    Date/Time   Fri May 26, 2023 10:48 PM    Comment   [facility] discharge to home/self care                 Follow-up Information     Follow up With Specialties Details Why Contact Info Additional Information    Toro Stack,  Family Medicine Schedule an appointment as soon as possible for a visit  For follow up to ensure improvement, and for further testing and treatment as needed [facility] [address] [place] [address] Emergency Department Emergency Medicine  If symptoms worsen [facility] Emergency Department, [address] [place] [address], [id number]          Discharge Medication List as of 5/26/2023 10:48 PM      CONTINUE these medications which have NOT CHANGED    Details   ascorbic acid (VITAMIN C) 500 mg tablet Take 1 tablet (500 mg total) by mouth daily, Starting Fri 4/17/2020, Normal      aspirin (ECOTRIN LOW STRENGTH) 81 mg EC tablet Take 81 mg by mouth daily, Historical Med      Biotin w/ Vitamins C & E (HAIR/SKIN/NAILS PO) Take 2 capsules by mouth in the morning, Historical Med      Blood Glucose Monitoring Suppl (ONE TOUCH ULTRA 2) w/Device KIT Use 4 (four) times a day, Starting Tue 11/29/2022, Normal      calcium carbonate (TUMS) 500 mg chewable tablet Chew 1 tablet daily PRN, Historical Med      chlorzoxazone (PARAFON FORTE) 500 mg tablet Take 1 tablet (500 mg total) by mouth 4 (four) times a day as needed for muscle spasms, Starting Thu 1/5/2023, Normal      cyanocobalamin (VITAMIN B-12) 500 MCG tablet Take 500 mcg by mouth daily, Historical Med      dimenhyDRINATE (DRAMAMINE) 50 mg tablet Take 50 mg by mouth 2 (two) times a day  , Historical Med      docusate sodium (COLACE) 100 mg capsule Take 100 mg by mouth 2 (two) times a day as needed  , Historical Med      glucose blood (OneTouch Ultra) test strip Test blood sugar 3 to 4 times daily, Normal      hydrocortisone (ANUSOL-HC) 2 5 % rectal cream Apply topically 2 (two) times a day, Starting Fri 11/12/2021, Normal      insulin aspart (NovoLOG FlexPen) 100 UNIT/ML injection pen Inject 10 Units under the skin 3 (three) times a day with meals, Starting Wed 5/3/2023, Normal      insulin degludec Brii Baldwin FlexTouch) 200 units/mL CONCENTRATED U-200 injection pen Inject 10 units at 11:00 a m  And 15 units at evening meal, Normal      insulin lispro (HumaLOG KwikPen) 100 units/mL injection pen Inject 10 Units under the skin 3 (three) times a day with meals, Starting Tue 12/13/2022, Normal      Insulin Pen Needle (BD Pen Needle Ignacia 2nd Gen) 32G X 4 MM MISC Inject under the skin daily, Starting Wed 5/3/2023, Normal      meloxicam (Mobic) 15 mg tablet Take 1 tablet (15 mg total) by mouth daily, Starting Thu 1/5/2023, Normal      metFORMIN (GLUCOPHAGE) 1000 MG tablet Take 1 tablet (1,000 mg total) by mouth 2 (two) times a day with meals, Starting Wed 5/3/2023, Normal      methylcellulose (CITRUCEL) 500 mg tablet Take 1,000 mg by mouth daily  , Historical Med      Multiple Vitamin (MULTI-VITAMIN DAILY PO) Take by mouth, Historical Med      Omega-3 Fatty Acids (fish oil) 1,000 mg Take 1,000 mg by mouth daily, Historical Med      omeprazole (PriLOSEC) 20 mg delayed release capsule Take 1 capsule (20 mg total) by mouth daily before breakfast, Starting Fri 4/17/2020, Normal      OneTouch Delica Lancets 80U MISC Use 4 (four) times a day Test blood sugar 3 to 4 times daily, Starting Wed 8/31/2022, Normal             No discharge procedures on file      PDMP Review       Value Time User    PDMP Reviewed  Yes 5/20/2023  8:20 AM Zoya Lam MD          ED Provider  Electronically Signed by           Terry Parry DO  05/27/23 0107

## 2023-06-01 ENCOUNTER — OFFICE VISIT (OUTPATIENT)
Dept: FAMILY MEDICINE CLINIC | Facility: CLINIC | Age: 58
End: 2023-06-01

## 2023-06-01 VITALS
HEART RATE: 64 BPM | SYSTOLIC BLOOD PRESSURE: 112 MMHG | BODY MASS INDEX: 21.62 KG/M2 | TEMPERATURE: 98.2 F | WEIGHT: 122 LBS | HEIGHT: 63 IN | OXYGEN SATURATION: 97 % | DIASTOLIC BLOOD PRESSURE: 54 MMHG

## 2023-06-01 DIAGNOSIS — I10 ESSENTIAL HYPERTENSION: ICD-10-CM

## 2023-06-01 DIAGNOSIS — E11.65 UNCONTROLLED TYPE 2 DIABETES MELLITUS WITH HYPERGLYCEMIA (HCC): Primary | ICD-10-CM

## 2023-06-01 DIAGNOSIS — E11.65 TYPE II DIABETES MELLITUS WITH HYPEROSMOLARITY, UNCONTROLLED (HCC): ICD-10-CM

## 2023-06-01 DIAGNOSIS — K52.9 ENTERITIS: ICD-10-CM

## 2023-06-01 DIAGNOSIS — E11.00 TYPE II DIABETES MELLITUS WITH HYPEROSMOLARITY, UNCONTROLLED (HCC): ICD-10-CM

## 2023-06-01 RX ORDER — PEN NEEDLE, DIABETIC 32GX 5/32"
NEEDLE, DISPOSABLE MISCELLANEOUS 3 TIMES DAILY
Qty: 100 EACH | Refills: 11 | Status: SHIPPED | OUTPATIENT
Start: 2023-06-01

## 2023-06-01 NOTE — ASSESSMENT & PLAN NOTE
Management post enteritis at hospital admitted for 3 days of IV fluids patient improving at this point now following up indicating new further bowel disruption

## 2023-06-01 NOTE — ASSESSMENT & PLAN NOTE
Lab Results   Component Value Date    HGBA1C 12 9 (H) 05/17/2023   Diabetes overall poor control working on adjusting insulin and diabetic education regarding diet    Follow-up as scheduled at next visit

## 2023-06-01 NOTE — PROGRESS NOTES
Assessment & Plan     1  Uncontrolled type 2 diabetes mellitus with hyperglycemia [facility])  Assessment & Plan:    Lab Results   Component Value Date    HGBA1C 12 9 (H) 05/17/2023   Diabetes overall poor control working on adjusting insulin and diabetic education regarding diet  Follow-up as scheduled at next visit    Orders:  -     Albumin / creatinine urine ratio; Future; Expected date: 09/01/2023  -     Comprehensive metabolic panel; Future; Expected date: 09/01/2023  -     Hemoglobin A1C; Future; Expected date: 09/01/2023    2  Enteritis  Assessment & Plan:  Management post enteritis at hospital admitted for 3 days of IV fluids patient improving at this point now following up indicating new further bowel disruption    Orders:  -     Albumin / creatinine urine ratio; Future; Expected date: 09/01/2023  -     Comprehensive metabolic panel; Future; Expected date: 09/01/2023  -     Hemoglobin A1C; Future; Expected date: 09/01/2023    3  Essential hypertension  Assessment & Plan:  Blood pressure is stable continuing on same medication regimen no change at this time follow-up as scheduled at next visit    Orders:  -     Albumin / creatinine urine ratio; Future; Expected date: 09/01/2023  -     Comprehensive metabolic panel; Future; Expected date: 09/01/2023  -     Hemoglobin A1C; Future; Expected date: 09/01/2023    4  Type II diabetes mellitus with hyperosmolarity, uncontrolled (HCC)  -     Insulin Pen Needle (BD Pen Needle Ignacia 2nd Gen) 32G X 4 MM MISC; Inject under the skin 3 (three) times a day  -     Albumin / creatinine urine ratio; Future; Expected date: 09/01/2023  -     Comprehensive metabolic panel; Future; Expected date: 09/01/2023  -     Hemoglobin A1C; Future; Expected date: 09/01/2023       Subjective     Transitional Care Management Review:   Jennifer Castro is a 63 y o  female here for TCM follow up       During the TCM phone call patient stated:  TCM Call     Date and time call was made  5/23/2023 10:17 AM Hospital care reviewed  Records reviewed    Patient was hospitialized at  [facility]    Date of Admission  05/18/23    Date of discharge  05/20/23    Diagnosis  Enteritis    Disposition  Home    Were the patients medications reviewed and updated  No    Current Symptoms  None      TCM Call     Post hospital issues  None    Should patient be enrolled in anticoag monitoring? No    Scheduled for follow up? Yes    Did you obtain your prescribed medications  Yes    Do you need help managing your prescriptions or medications  No    Is transportation to your appointment needed  No    I have advised the patient to call PCP with any new or worsening symptoms  Renetta Barnett  Family members    Comments  left message for patient to call and schedule follow up appointment        She is seen in follow-up evaluation transition of care management posthospitalization for 3 days of IV fluids for enteritis complicated by uncontrolled diabetes    Review of Systems   Constitutional: Negative for chills, fatigue and fever  HENT: Negative for congestion, nosebleeds, rhinorrhea, sinus pressure and sore throat  Eyes: Negative for discharge and redness  Respiratory: Negative for cough and shortness of breath  Cardiovascular: Negative for chest pain, palpitations and leg swelling  Gastrointestinal: Positive for abdominal distention and diarrhea  Negative for abdominal pain, blood in stool and nausea  Endocrine: Negative for cold intolerance, heat intolerance and polyuria  Genitourinary: Negative for dysuria and frequency  Musculoskeletal: Negative for arthralgias, back pain and myalgias  Skin: Negative for rash  Neurological: Negative for dizziness, weakness and headaches  Hematological: Negative for adenopathy  Psychiatric/Behavioral: Negative for behavioral problems and sleep disturbance  The patient is not nervous/anxious          Objective     /54   Pulse 64   Temp 98 2 °F "(36 8 °C)   Ht 5' 3\" (1 6 m)   Wt 55 3 kg (122 lb)   SpO2 97%   BMI 21 61 kg/m²      Physical Exam  Vitals and nursing note reviewed  Constitutional:       General: She is not in acute distress  Appearance: Normal appearance  She is well-developed and normal weight  HENT:      Head: Normocephalic and atraumatic  Right Ear: Tympanic membrane, ear canal and external ear normal       Left Ear: Tympanic membrane, ear canal and external ear normal       Nose: Nose normal       Mouth/Throat:      Mouth: Mucous membranes are moist       Pharynx: Oropharynx is clear  Eyes:      Conjunctiva/sclera: Conjunctivae normal    Cardiovascular:      Rate and Rhythm: Normal rate and regular rhythm  Pulses: Normal pulses  Heart sounds: Normal heart sounds  No murmur heard  Pulmonary:      Effort: Pulmonary effort is normal  No respiratory distress  Breath sounds: Normal breath sounds  Abdominal:      General: Bowel sounds are normal       Palpations: Abdomen is soft  Tenderness: There is no abdominal tenderness  Musculoskeletal:         General: No swelling  Cervical back: Normal range of motion and neck supple  Skin:     General: Skin is warm and dry  Capillary Refill: Capillary refill takes less than 2 seconds  Neurological:      General: No focal deficit present  Mental Status: She is alert and oriented to person, place, and time  Mental status is at baseline  Psychiatric:         Mood and Affect: Mood normal          Behavior: Behavior normal          Thought Content:  Thought content normal          Judgment: Judgment normal        Medications have been reviewed by provider in current encounter    Lauren Aguayo DO"

## 2023-06-01 NOTE — ASSESSMENT & PLAN NOTE
Blood pressure is stable continuing on same medication regimen no change at this time follow-up as scheduled at next visit

## 2023-06-28 ENCOUNTER — VBI (OUTPATIENT)
Dept: ADMINISTRATIVE | Facility: OTHER | Age: 58
End: 2023-06-28

## 2023-07-06 ENCOUNTER — VBI (OUTPATIENT)
Dept: ADMINISTRATIVE | Facility: OTHER | Age: 58
End: 2023-07-06

## 2023-08-01 ENCOUNTER — TELEPHONE (OUTPATIENT)
Dept: GASTROENTEROLOGY | Facility: CLINIC | Age: 58
End: 2023-08-01

## 2023-10-27 ENCOUNTER — VBI (OUTPATIENT)
Dept: ADMINISTRATIVE | Facility: OTHER | Age: 58
End: 2023-10-27

## 2023-11-03 ENCOUNTER — OFFICE VISIT (OUTPATIENT)
Dept: FAMILY MEDICINE CLINIC | Facility: CLINIC | Age: 58
End: 2023-11-03
Payer: COMMERCIAL

## 2023-11-03 VITALS
HEIGHT: 63 IN | BODY MASS INDEX: 20.62 KG/M2 | OXYGEN SATURATION: 97 % | DIASTOLIC BLOOD PRESSURE: 76 MMHG | TEMPERATURE: 97.9 F | HEART RATE: 75 BPM | RESPIRATION RATE: 18 BRPM | SYSTOLIC BLOOD PRESSURE: 112 MMHG | WEIGHT: 116.4 LBS

## 2023-11-03 DIAGNOSIS — Z12.31 VISIT FOR SCREENING MAMMOGRAM: ICD-10-CM

## 2023-11-03 DIAGNOSIS — K43.6 VENTRAL HERNIA WITH OBSTRUCTION AND WITHOUT GANGRENE: ICD-10-CM

## 2023-11-03 DIAGNOSIS — E11.65 TYPE 2 DIABETES MELLITUS WITH HYPERGLYCEMIA, WITH LONG-TERM CURRENT USE OF INSULIN (HCC): ICD-10-CM

## 2023-11-03 DIAGNOSIS — E11.65 UNCONTROLLED TYPE 2 DIABETES MELLITUS WITH HYPERGLYCEMIA (HCC): Primary | ICD-10-CM

## 2023-11-03 DIAGNOSIS — Z79.4 TYPE 2 DIABETES MELLITUS WITH HYPERGLYCEMIA, WITH LONG-TERM CURRENT USE OF INSULIN (HCC): ICD-10-CM

## 2023-11-03 DIAGNOSIS — Z01.411 ABNORMAL GYNECOLOGICAL EXAMINATION: Primary | ICD-10-CM

## 2023-11-03 DIAGNOSIS — Z00.00 ANNUAL PHYSICAL EXAM: ICD-10-CM

## 2023-11-03 DIAGNOSIS — Z12.11 SCREENING FOR COLON CANCER: ICD-10-CM

## 2023-11-03 DIAGNOSIS — K92.1 BLOOD IN THE STOOL: ICD-10-CM

## 2023-11-03 DIAGNOSIS — K52.9 ENTERITIS: ICD-10-CM

## 2023-11-03 DIAGNOSIS — K21.9 GASTROESOPHAGEAL REFLUX DISEASE WITHOUT ESOPHAGITIS: ICD-10-CM

## 2023-11-03 LAB
CREAT UR-MCNC: 25.1 MG/DL
MICROALBUMIN UR-MCNC: <7 MG/L
MICROALBUMIN/CREAT 24H UR: <28 MG/G CREATININE (ref 0–30)

## 2023-11-03 PROCEDURE — 82570 ASSAY OF URINE CREATININE: CPT | Performed by: FAMILY MEDICINE

## 2023-11-03 PROCEDURE — 82043 UR ALBUMIN QUANTITATIVE: CPT | Performed by: FAMILY MEDICINE

## 2023-11-03 PROCEDURE — 99396 PREV VISIT EST AGE 40-64: CPT | Performed by: FAMILY MEDICINE

## 2023-11-03 RX ORDER — LANCETS 30 GAUGE
EACH MISCELLANEOUS 4 TIMES DAILY
Qty: 1 KIT | Refills: 0 | Status: SHIPPED | OUTPATIENT
Start: 2023-11-03

## 2023-11-03 NOTE — ASSESSMENT & PLAN NOTE
Patient notes that her meter broke and she stopped using all insulin and diabetic medications she has had abdominal pain cramping vaginal discharge and blood in stool along with constipation and irregular bowel movements. She has had excessive thirst and is drinking a lot of water. I described the symptoms of hyperglycemia with diabetes and need for continued medication use including insulin and medications oral dosing. I ordered a new glucometer for her at this point.   Follow-up closely  Lab Results   Component Value Date    HGBA1C 12.9 (H) 05/17/2023

## 2023-11-03 NOTE — PROGRESS NOTES
Diabetic Foot Exam    Patient's shoes and socks removed. Right Foot/Ankle   Right Foot Inspection  Skin Exam: skin normal and skin intact. No dry skin, no warmth, no callus, no erythema, no maceration, no abnormal color, no pre-ulcer, no ulcer and no callus. Toe Exam: ROM and strength within normal limits. Sensory   Monofilament testing: intact    Vascular  Capillary refills: < 3 seconds  The right DP pulse is 2+. The right PT pulse is 2+. Left Foot/Ankle  Left Foot Inspection  Skin Exam: skin normal and skin intact. No dry skin, no warmth, no erythema, no maceration, normal color, no pre-ulcer, no ulcer and no callus. Toe Exam: ROM and strength within normal limits. Sensory   Monofilament testing: intact    Vascular  Capillary refills: < 3 seconds  The left DP pulse is 2+. The left PT pulse is 2+. Assign Risk Category  No deformity present  No loss of protective sensation  No weak pulses  Risk: 0  ADULT [id number]    NAME: Adron Schaumann  AGE: 63 y.o. SEX: female  : 1965     DATE: 11/3/2023     Assessment and Plan:     Problem List Items Addressed This Visit          Digestive    GERD (gastroesophageal reflux disease)     Continue with same medication regimen and follow-up closely with gastroenterology         Ventral hernia with obstruction and without gangrene    Enteritis     Combination of diarrhea and constipation off and on. Once blood sugars are under better control will watch for stabilization of bowel habits.   Add fiber to diet now including sugar-free Metamucil            Endocrine    Type 2 diabetes mellitus with hyperglycemia, with long-term current use of insulin ([address])     Patient notes that her meter broke and she stopped using all insulin and diabetic medications she has had abdominal pain cramping vaginal discharge and blood in stool along with constipation and irregular bowel movements. She has had excessive thirst and is drinking a lot of water. I described the symptoms of hyperglycemia with diabetes and need for continued medication use including insulin and medications oral dosing. I ordered a new glucometer for her at this point. Follow-up closely  Lab Results   Component Value Date    HGBA1C 12.9 (H) 05/17/2023          Relevant Medications    Blood Glucose Monitoring Suppl (ONE TOUCH ULTRA 2) w/Device KIT    Other Relevant Orders    Albumin / creatinine urine ratio    Albumin / creatinine urine ratio    Comprehensive metabolic panel    Hemoglobin A1C    TSH, 3rd generation with Free T4 reflex    Lipid Panel with Direct LDL reflex       Other    Annual physical exam    Blood in the stool    Relevant Orders    Ambulatory Referral to Gastroenterology    Visit for screening mammogram    Relevant Orders    Mammo screening bilateral w 3d & cad     Other Visit Diagnoses       Abnormal gynecological examination    -  Primary    Relevant Orders    Ambulatory Referral to Gynecology    Screening for colon cancer        Relevant Orders    Ambulatory Referral to Gastroenterology            Immunizations and preventive care screenings were discussed with patient today. Appropriate education was printed on patient's after visit summary. Counseling:  Alcohol/drug use: discussed moderation in alcohol intake, the recommendations for healthy alcohol use, and avoidance of illicit drug use. Dental Health: discussed importance of regular tooth brushing, flossing, and dental visits. Injury prevention: discussed safety/seat belts, safety helmets, smoke detectors, carbon dioxide detectors, and smoking near bedding or upholstery. Sexual health: discussed sexually transmitted diseases, partner selection, use of condoms, avoidance of unintended pregnancy, and contraceptive alternatives. Exercise: the importance of regular exercise/physical activity was discussed.  Recommend exercise 3-5 times per week for at least 30 minutes. Return in about 3 months (around 2/3/2024). Chief Complaint:     Chief Complaint   Patient presents with    Irritable Bowel Syndrome     Burning in genital area    Physical Exam     Annual Physical also due - patient has also lost weight  Bowel movements very hard and she noticed yellow mucous type of stuff  Lower ab pain and dry mouth      History of Present Illness:     Adult Annual Physical   Patient here for a comprehensive physical exam. The patient reports no problems. Diet and Physical Activity  Diet/Nutrition: well balanced diet. Exercise: no formal exercise. Depression Screening  PHQ-2/9 Depression Screening           General Health  Sleep: sleeps well. Hearing: normal - bilateral.  Vision: no vision problems. Dental: regular dental visits. /GYN Health  Patient is: postmenopausal  Last menstrual period:   Contraceptive method: . Advanced Care Planning  Do you have an advanced directive? Do you have a durable medical power of ? Review of Systems:     Review of Systems   Constitutional:  Negative for chills and fever. HENT:  Negative for ear pain and sore throat. Eyes:  Negative for pain and visual disturbance. Respiratory:  Negative for cough and shortness of breath. Cardiovascular:  Negative for chest pain and palpitations. Gastrointestinal:  Negative for abdominal pain and vomiting. Genitourinary:  Negative for dysuria and hematuria. Musculoskeletal:  Negative for arthralgias and back pain. Skin:  Negative for color change and rash. Neurological:  Negative for seizures and syncope. All other systems reviewed and are negative.      Past Medical History:     Past Medical History:   Diagnosis Date    Arthritis     Colonic polyp     resolved: 08/25/2017    Diabetes mellitus ([address])     Dry eye syndrome of bilateral lacrimal glands     last assessed: 05/15/2015    GERD (gastroesophageal reflux disease) History of screening mammography     last assessed: 2016    Hypertension     Lyme disease     Osteoarthritis     last assessed: 2016    RA (rheumatoid arthritis) (HCC)     Rheumatoid arteritis (HCC)     Rheumatoid arthritis (HCC)     osteoarthritis    Stroke ([address])     Symptomatic hypotension     last assessed: 2017    Ventral hernia with obstruction and without gangrene     last assessed: 2017    Vertigo       Past Surgical History:     Past Surgical History:   Procedure Laterality Date    BACK SURGERY      CYST REMOVAL      EGD AND COLONOSCOPY N/A 2/3/2017    Procedure: COLONOSCOPY; POSSIBLE POLYPECTOMY; POSSIBLE BIOPSY AND EGD ;  Surgeon: Kulwant Barnes MD;  Location: [facility];   Service:     HERNIA REPAIR      LIPOMA RESECTION      OTHER SURGICAL HISTORY      excision of sebaceous cst to back    TUBAL LIGATION      VENTRAL HERNIA REPAIR N/A 3/17/2017    Procedure: LAPAROSCOPIC VENTRAL HERNIA REPAIR WITH MESH ;  Surgeon: Kulwant Barnes MD;  Location: [facility];  Service:       Social History:     Social History     Socioeconomic History    Marital status: /Civil Union     Spouse name: None    Number of children: None    Years of education: None    Highest education level: None   Occupational History    None   Tobacco Use    Smoking status: Former     Packs/day: 0.25     Years: 2.00     Total pack years: 0.50     Types: Cigarettes     Start date:      Quit date:      Years since quittin.8    Smokeless tobacco: Never   Vaping Use    Vaping Use: Never used   Substance and Sexual Activity    Alcohol use: Not Currently     Comment: Stopped drinking alcohol     Drug use: No    Sexual activity: Yes     Partners: Male   Other Topics Concern    None   Social History Narrative    Daily coffee consumption    Daily tea consumption     Uses seat belts      Social Determinants of Health     Financial Resource Strain: Not on file   Food Insecurity: Not on file   Transportation Needs: Not on file   Physical Activity: Not on file   Stress: Not on file   Social Connections: Not on file   Intimate Partner Violence: Not on file   Housing Stability: Not on file      Family History:     Family History   Problem Relation Age of Onset    COPD Mother         COPD/Emphysema    Diabetes Mother     Heart disease Father     Stroke Father     COPD Father         COPD/Emphysema    Heart attack Father     Diabetes Paternal Grandmother     Breast cancer Paternal Aunt     Depression Daughter       Current Medications:     Current Outpatient Medications   Medication Sig Dispense Refill    ascorbic acid (VITAMIN C) 500 mg tablet Take 1 tablet (500 mg total) by mouth daily 60 tablet 5    aspirin (ECOTRIN LOW STRENGTH) 81 mg EC tablet Take 81 mg by mouth daily      Biotin w/ Vitamins C & E (HAIR/SKIN/NAILS PO) Take 2 capsules by mouth in the morning      Blood Glucose Monitoring Suppl (ONE TOUCH ULTRA 2) w/Device KIT Use 4 (four) times a day 1 kit 0    calcium carbonate (TUMS) 500 mg chewable tablet Chew 1 tablet daily PRN      cyanocobalamin (VITAMIN B-12) 500 MCG tablet Take 500 mcg by mouth daily      dimenhyDRINATE (DRAMAMINE) 50 mg tablet Take 50 mg by mouth 2 (two) times a day        docusate sodium (COLACE) 100 mg capsule Take 100 mg by mouth 2 (two) times a day as needed        glucose blood (OneTouch Ultra) test strip Test blood sugar 3 to 4 times daily 300 each 2    hydrocortisone (ANUSOL-HC) 2.5 % rectal cream Apply topically 2 (two) times a day (Patient taking differently: Apply topically 2 (two) times a day PRN) 28 g 0    insulin aspart (NovoLOG FlexPen) 100 UNIT/ML injection pen Inject 10 Units under the skin 3 (three) times a day with meals 30 mL 2    insulin degludec [place] FlexTouch) 200 units/mL CONCENTRATED U-200 injection pen Inject 10 units at 11:00 a.m.  And 15 units at evening meal 12 mL 3    insulin lispro (HumaLOG KwikPen) 100 units/mL injection pen Inject 10 Units under the skin 3 (three) times a day with meals 30 mL 5    Insulin Pen Needle (BD Pen Needle Ignacia 2nd Gen) 32G X 4 MM MISC Inject under the skin 3 (three) times a day 100 each 11    metFORMIN (GLUCOPHAGE) 1000 MG tablet Take 1 tablet (1,000 mg total) by mouth 2 (two) times a day with meals 60 tablet 1    methylcellulose (CITRUCEL) 500 mg tablet Take 1,000 mg by mouth daily      Multiple Vitamin (MULTI-VITAMIN DAILY PO) Take by mouth      Omega-3 Fatty Acids (fish oil) 1,000 mg Take 1,000 mg by mouth daily      omeprazole (PriLOSEC) 20 mg delayed release capsule Take 1 capsule (20 mg total) by mouth daily before breakfast 30 capsule 5    OneTouch Delica Lancets 42A MISC Use 4 (four) times a day Test blood sugar 3 to 4 times daily 300 each 2     No current facility-administered medications for this visit. Allergies: Allergies   Allergen Reactions    Penicillins Rash      Physical Exam:     /76 (BP Location: Right arm, Patient Position: Sitting, Cuff Size: Standard)   Pulse 75   Temp 97.9 °F (36.6 °C) (Tympanic)   Resp 18   Ht 5' 3" (1.6 m)   Wt 52.8 kg (116 lb 6.4 oz)   SpO2 97%   BMI 20.62 kg/m²     Physical Exam  Vitals and nursing note reviewed. Constitutional:       General: She is not in acute distress. Appearance: She is well-developed. HENT:      Head: Normocephalic and atraumatic. Right Ear: Tympanic membrane normal.      Left Ear: Tympanic membrane normal.      Nose: Nose normal.      Mouth/Throat:      Mouth: Mucous membranes are moist.   Eyes:      Conjunctiva/sclera: Conjunctivae normal.   Cardiovascular:      Rate and Rhythm: Normal rate and regular rhythm. Pulses: Normal pulses. no weak pulses          Dorsalis pedis pulses are 2+ on the right side and 2+ on the left side. Posterior tibial pulses are 2+ on the right side and 2+ on the left side. Heart sounds: Normal heart sounds. No murmur heard.   Pulmonary:      Effort: Pulmonary effort is normal. No respiratory distress. Breath sounds: Normal breath sounds. Abdominal:      General: Bowel sounds are normal. There is distension. Palpations: Abdomen is soft. Tenderness: There is abdominal tenderness. Musculoskeletal:         General: No swelling. Cervical back: Neck supple. Feet:      Right foot:      Skin integrity: No ulcer, skin breakdown, erythema, warmth, callus or dry skin. Left foot:      Skin integrity: No ulcer, skin breakdown, erythema, warmth, callus or dry skin. Skin:     General: Skin is warm and dry. Capillary Refill: Capillary refill takes less than 2 seconds. Neurological:      General: No focal deficit present. Mental Status: She is alert.    Psychiatric:         Mood and Affect: Mood normal.         Judgment: Judgment normal.          Jerry Chase,   [id number] [facility]

## 2023-11-03 NOTE — ASSESSMENT & PLAN NOTE
Combination of diarrhea and constipation off and on. Once blood sugars are under better control will watch for stabilization of bowel habits.   Add fiber to diet now including sugar-free Metamucil

## 2023-11-21 ENCOUNTER — OFFICE VISIT (OUTPATIENT)
Dept: FAMILY MEDICINE CLINIC | Facility: CLINIC | Age: 58
End: 2023-11-21
Payer: COMMERCIAL

## 2023-11-21 VITALS — OXYGEN SATURATION: 99 % | TEMPERATURE: 97.1 F | RESPIRATION RATE: 18 BRPM | HEART RATE: 68 BPM

## 2023-11-21 DIAGNOSIS — E11.65 TYPE 2 DIABETES MELLITUS WITH HYPERGLYCEMIA, WITH LONG-TERM CURRENT USE OF INSULIN (HCC): ICD-10-CM

## 2023-11-21 DIAGNOSIS — I10 ESSENTIAL HYPERTENSION: ICD-10-CM

## 2023-11-21 DIAGNOSIS — Z79.4 TYPE 2 DIABETES MELLITUS WITH HYPERGLYCEMIA, WITH LONG-TERM CURRENT USE OF INSULIN (HCC): ICD-10-CM

## 2023-11-21 DIAGNOSIS — R09.81 SINUS CONGESTION: Primary | ICD-10-CM

## 2023-11-21 DIAGNOSIS — J06.9 ACUTE URI: ICD-10-CM

## 2023-11-21 LAB
SARS-COV-2 AG UPPER RESP QL IA: NEGATIVE
VALID CONTROL: NORMAL

## 2023-11-21 PROCEDURE — 87811 SARS-COV-2 COVID19 W/OPTIC: CPT | Performed by: NURSE PRACTITIONER

## 2023-11-21 PROCEDURE — 99214 OFFICE O/P EST MOD 30 MIN: CPT | Performed by: NURSE PRACTITIONER

## 2023-11-21 RX ORDER — BENZONATATE 100 MG/1
100 CAPSULE ORAL 3 TIMES DAILY PRN
Qty: 20 CAPSULE | Refills: 0 | Status: SHIPPED | OUTPATIENT
Start: 2023-11-21 | End: 2023-11-28

## 2023-11-21 RX ORDER — AZITHROMYCIN 250 MG/1
TABLET, FILM COATED ORAL
Qty: 6 TABLET | Refills: 0 | Status: SHIPPED | OUTPATIENT
Start: 2023-11-21 | End: 2023-11-25

## 2023-11-21 NOTE — ASSESSMENT & PLAN NOTE
Lab Results   Component Value Date    HGBA1C 12.9 (H) 05/17/2023   Has been monitoring her sugars closely during acute sick visit. Reports glucose today 65.

## 2023-11-21 NOTE — PROGRESS NOTES
OFFICE VISIT  Zoraida Freeman 63 y.o. female MRN: [id number]          Assessment / Plan:  Problem List Items Addressed This Visit          Endocrine    Type 2 diabetes mellitus with hyperglycemia, with long-term current use of insulin ([address])       Lab Results   Component Value Date    HGBA1C 12.9 (H) 05/17/2023   Has been monitoring her sugars closely during acute sick visit. Reports glucose today 65. Respiratory    Sinus congestion - Primary    Relevant Orders    POCT Rapid Covid Ag (Completed)       Cardiovascular and Mediastinum    Essential hypertension     Please avoid over-the-counter cough and cold medication that have a decongestant this will raise your blood pressure. Other Visit Diagnoses       Acute URI        Relevant Medications    azithromycin (ZITHROMAX) 250 mg tablet    benzonatate (TESSALON PERLES) 100 mg capsule              Reason For Visit / Chief Complaint  Chief Complaint   Patient presents with    Sore Throat    Cough     Started last week-         HPI:  Zoraida Freeman is a 63 y.o. female who presents today for voice change, cough, runny nose. She reports dry cough. No ill contacts at home. She reports eating and drinking, sugar, 190 today. She is using nyquil and dayquil. Symptoms going on for one week. She reports no improvement.        Historical Information   Past Medical History:   Diagnosis Date    Arthritis     Colonic polyp     resolved: 08/25/2017    Diabetes mellitus ([address])     Dry eye syndrome of bilateral lacrimal glands     last assessed: 05/15/2015    GERD (gastroesophageal reflux disease)     History of screening mammography     last assessed: 07/25/2016    Hypertension     Lyme disease     Osteoarthritis     last assessed: 07/25/2016    RA (rheumatoid arthritis) (HCC)     Rheumatoid arteritis (HCC)     Rheumatoid arthritis (HCC)     osteoarthritis    Stroke ([address])     Symptomatic hypotension     last assessed: 03/22/2017    Ventral hernia with obstruction and without gangrene     last assessed: 2017    Vertigo      Past Surgical History:   Procedure Laterality Date    BACK SURGERY      CYST REMOVAL      EGD AND COLONOSCOPY N/A 2/3/2017    Procedure: COLONOSCOPY; POSSIBLE POLYPECTOMY; POSSIBLE BIOPSY AND EGD ;  Surgeon: Benito Prado MD;  Location: [facility];   Service:     HERNIA REPAIR      LIPOMA RESECTION      OTHER SURGICAL HISTORY      excision of sebaceous cst to back    TUBAL LIGATION      VENTRAL HERNIA REPAIR N/A 3/17/2017    Procedure: LAPAROSCOPIC VENTRAL HERNIA REPAIR WITH MESH ;  Surgeon: Benito Prado MD;  Location: [facility];  Service:      Social History   Social History     Substance and Sexual Activity   Alcohol Use Not Currently    Comment: Stopped drinking alcohol      Social History     Substance and Sexual Activity   Drug Use No     Social History     Tobacco Use   Smoking Status Former    Packs/day: 0.25    Years: 2.00    Total pack years: 0.50    Types: Cigarettes    Start date:     Quit date:     Years since quittin.9   Smokeless Tobacco Never     Family History   Problem Relation Age of Onset    COPD Mother         COPD/Emphysema    Diabetes Mother     Heart disease Father     Stroke Father     COPD Father         COPD/Emphysema    Heart attack Father     Diabetes Paternal Grandmother     Breast cancer Paternal Aunt     Depression Daughter        Meds/Allergies   Allergies   Allergen Reactions    Penicillins Rash       Meds:    Current Outpatient Medications:     azithromycin (ZITHROMAX) 250 mg tablet, Take 2 tablets today then 1 tablet daily x 4 days, Disp: 6 tablet, Rfl: 0    benzonatate (TESSALON PERLES) 100 mg capsule, Take 1 capsule (100 mg total) by mouth 3 (three) times a day as needed for cough for up to 7 days, Disp: 20 capsule, Rfl: 0    ascorbic acid (VITAMIN C) 500 mg tablet, Take 1 tablet (500 mg total) by mouth daily, Disp: 60 tablet, Rfl: 5    aspirin (ECOTRIN LOW STRENGTH) 81 mg EC tablet, Take 81 mg by mouth daily, Disp: , Rfl:     Biotin w/ Vitamins C & E (HAIR/SKIN/NAILS PO), Take 2 capsules by mouth in the morning, Disp: , Rfl:     Blood Glucose Monitoring Suppl (ONE TOUCH ULTRA 2) w/Device KIT, Use 4 (four) times a day, Disp: 1 kit, Rfl: 0    calcium carbonate (TUMS) 500 mg chewable tablet, Chew 1 tablet daily PRN, Disp: , Rfl:     Continuous Blood Gluc  (FreeStyle [place] 2 [place]) MARGOTH, Check blood sugar multiple times daily, Disp: 1 each, Rfl: 2    Continuous Blood Gluc Sensor (FreeStyle [place] 2 Sensor) MISC, Use 1 each every 14 (fourteen) days, Disp: 2 each, Rfl: 2    cyanocobalamin (VITAMIN B-12) 500 MCG tablet, Take 500 mcg by mouth daily, Disp: , Rfl:     dimenhyDRINATE (DRAMAMINE) 50 mg tablet, Take 50 mg by mouth 2 (two) times a day  , Disp: , Rfl:     docusate sodium (COLACE) 100 mg capsule, Take 100 mg by mouth 2 (two) times a day as needed  , Disp: , Rfl:     glucose blood (OneTouch Ultra) test strip, Test blood sugar 3 to 4 times daily, Disp: 300 each, Rfl: 2    hydrocortisone (ANUSOL-HC) 2.5 % rectal cream, Apply topically 2 (two) times a day (Patient taking differently: Apply topically 2 (two) times a day PRN), Disp: 28 g, Rfl: 0    insulin aspart (NovoLOG FlexPen) 100 UNIT/ML injection pen, Inject 10 Units under the skin 3 (three) times a day with meals, Disp: 30 mL, Rfl: 2    insulin degludec [place] FlexTouch) 200 units/mL CONCENTRATED U-200 injection pen, Inject 10 units at 11:00 a.m.  And 15 units at evening meal, Disp: 12 mL, Rfl: 3    insulin lispro (HumaLOG KwikPen) 100 units/mL injection pen, Inject 10 Units under the skin 3 (three) times a day with meals, Disp: 30 mL, Rfl: 5    Insulin Pen Needle (BD Pen Needle Ignacia 2nd Gen) 32G X 4 MM MISC, Inject under the skin 3 (three) times a day, Disp: 100 each, Rfl: 11    metFORMIN (GLUCOPHAGE) 1000 MG tablet, Take 1 tablet (1,000 mg total) by mouth 2 (two) times a day with meals, Disp: 60 tablet, Rfl: 1    methylcellulose (CITRUCEL) 500 mg tablet, Take 1,000 mg by mouth daily, Disp: , Rfl:     Multiple Vitamin (MULTI-VITAMIN DAILY PO), Take by mouth, Disp: , Rfl:     Omega-3 Fatty Acids (fish oil) 1,000 mg, Take 1,000 mg by mouth daily, Disp: , Rfl:     omeprazole (PriLOSEC) 20 mg delayed release capsule, Take 1 capsule (20 mg total) by mouth daily before breakfast, Disp: 30 capsule, Rfl: 5    OneTouch Delica Lancets 74G MISC, Use 4 (four) times a day Test blood sugar 3 to 4 times daily, Disp: 300 each, Rfl: 2      REVIEW OF SYSTEMS  Review of Systems   Constitutional:  Positive for activity change and fatigue. Negative for chills and fever. HENT:  Positive for congestion, rhinorrhea, sore throat and voice change. Negative for ear discharge, ear pain, sinus pressure, sinus pain, tinnitus and trouble swallowing. Eyes:  Negative for photophobia, pain, discharge, itching and visual disturbance. Respiratory:  Positive for cough. Negative for chest tightness, shortness of breath and wheezing. Cardiovascular:  Negative for chest pain and leg swelling. Gastrointestinal:  Negative for abdominal distention, abdominal pain, constipation, diarrhea, nausea and vomiting. Endocrine: Negative for polydipsia, polyphagia and polyuria. Genitourinary:  Negative for dysuria and frequency. Musculoskeletal:  Negative for arthralgias, myalgias, neck pain and neck stiffness. Skin:  Negative for color change. Neurological:  Negative for dizziness, syncope, weakness, numbness and headaches. Hematological:  Does not bruise/bleed easily. Psychiatric/Behavioral:  Negative for behavioral problems, confusion, self-injury, sleep disturbance and suicidal ideas. The patient is not nervous/anxious.             Current Vitals:   Pulse: 68 (11/21/23 1340)  Temperature: (!) 97.1 °F (36.2 °C) (11/21/23 1340)  Temp Source: Tympanic (11/21/23 1340)  Respirations: 18 (11/21/23 1340)  SpO2: 99 % (11/21/23 1340)  [unfilled]    PHYSICAL EXAMS:  Physical Exam  Constitutional:       Appearance: Normal appearance. She is well-developed. She is ill-appearing. HENT:      Head: Normocephalic and atraumatic. Right Ear: Tympanic membrane and ear canal normal.      Left Ear: Tympanic membrane and ear canal normal.      Nose: Rhinorrhea present. Mouth/Throat:      Mouth: Mucous membranes are dry. Eyes:      Conjunctiva/sclera: Conjunctivae normal.      Pupils: Pupils are equal, round, and reactive to light. Cardiovascular:      Rate and Rhythm: Normal rate and regular rhythm. Pulmonary:      Effort: Pulmonary effort is normal.      Breath sounds: No wheezing or rhonchi. Comments: Dry cough  Abdominal:      General: There is no distension. Tenderness: There is no abdominal tenderness. Musculoskeletal:         General: No swelling, tenderness, deformity or signs of injury. Cervical back: Normal range of motion and neck supple. Right lower leg: No edema. Left lower leg: No edema. Skin:     Findings: No bruising, erythema, lesion or rash. Neurological:      General: No focal deficit present. Mental Status: She is alert and oriented to person, place, and time. Psychiatric:         Mood and Affect: Mood normal.         Behavior: Behavior normal.         Thought Content: Thought content normal.         Judgment: Judgment normal.             Lab, imaging and other studies: I have personally reviewed pertinent reports. Lindsey Colon

## 2023-11-21 NOTE — ASSESSMENT & PLAN NOTE
Please avoid over-the-counter cough and cold medication that have a decongestant this will raise your blood pressure.

## 2023-11-29 ENCOUNTER — VBI (OUTPATIENT)
Dept: ADMINISTRATIVE | Facility: OTHER | Age: 58
End: 2023-11-29

## 2023-12-19 DIAGNOSIS — E11.65 UNCONTROLLED TYPE 2 DIABETES MELLITUS WITH HYPERGLYCEMIA (HCC): ICD-10-CM

## 2023-12-29 ENCOUNTER — VBI (OUTPATIENT)
Dept: ADMINISTRATIVE | Facility: OTHER | Age: 58
End: 2023-12-29

## 2024-01-16 ENCOUNTER — OFFICE VISIT (OUTPATIENT)
Dept: FAMILY MEDICINE CLINIC | Facility: CLINIC | Age: 59
End: 2024-01-16
Payer: COMMERCIAL

## 2024-01-16 VITALS
WEIGHT: 119.4 LBS | BODY MASS INDEX: 21.16 KG/M2 | HEIGHT: 63 IN | OXYGEN SATURATION: 99 % | SYSTOLIC BLOOD PRESSURE: 112 MMHG | TEMPERATURE: 97.9 F | DIASTOLIC BLOOD PRESSURE: 78 MMHG | HEART RATE: 81 BPM

## 2024-01-16 DIAGNOSIS — K64.0 GRADE I HEMORRHOIDS: ICD-10-CM

## 2024-01-16 DIAGNOSIS — E11.65 TYPE II DIABETES MELLITUS WITH HYPEROSMOLARITY, UNCONTROLLED (HCC): ICD-10-CM

## 2024-01-16 DIAGNOSIS — N76.0 BACTERIAL VAGINITIS: ICD-10-CM

## 2024-01-16 DIAGNOSIS — R31.9 HEMATURIA, UNSPECIFIED TYPE: ICD-10-CM

## 2024-01-16 DIAGNOSIS — R31.0 GROSS HEMATURIA: ICD-10-CM

## 2024-01-16 DIAGNOSIS — M05.9 RHEUMATOID ARTHRITIS WITH POSITIVE RHEUMATOID FACTOR, INVOLVING UNSPECIFIED SITE (HCC): ICD-10-CM

## 2024-01-16 DIAGNOSIS — N93.9 VAGINAL BLEEDING: ICD-10-CM

## 2024-01-16 DIAGNOSIS — Z79.4 TYPE 2 DIABETES MELLITUS WITH HYPERGLYCEMIA, WITH LONG-TERM CURRENT USE OF INSULIN (HCC): ICD-10-CM

## 2024-01-16 DIAGNOSIS — B96.89 BACTERIAL VAGINITIS: ICD-10-CM

## 2024-01-16 DIAGNOSIS — R31.9 HEMATURIA, UNSPECIFIED TYPE: Primary | ICD-10-CM

## 2024-01-16 DIAGNOSIS — E11.65 TYPE 2 DIABETES MELLITUS WITH HYPERGLYCEMIA, WITH LONG-TERM CURRENT USE OF INSULIN (HCC): ICD-10-CM

## 2024-01-16 DIAGNOSIS — I10 ESSENTIAL HYPERTENSION: ICD-10-CM

## 2024-01-16 DIAGNOSIS — B37.31 VAGINAL CANDIDIASIS: ICD-10-CM

## 2024-01-16 DIAGNOSIS — E11.00 TYPE II DIABETES MELLITUS WITH HYPEROSMOLARITY, UNCONTROLLED (HCC): ICD-10-CM

## 2024-01-16 LAB
SL AMB  POCT GLUCOSE, UA: ABNORMAL
SL AMB LEUKOCYTE ESTERASE,UA: ABNORMAL
SL AMB POCT BILIRUBIN,UA: ABNORMAL
SL AMB POCT BLOOD,UA: ABNORMAL
SL AMB POCT CLARITY,UA: CLEAR
SL AMB POCT COLOR,UA: YELLOW
SL AMB POCT KETONES,UA: 50
SL AMB POCT NITRITE,UA: ABNORMAL
SL AMB POCT PH,UA: 5
SL AMB POCT SPECIFIC GRAVITY,UA: 1.01
SL AMB POCT URINE PROTEIN: ABNORMAL
SL AMB POCT UROBILINOGEN: 0.2

## 2024-01-16 PROCEDURE — 81002 URINALYSIS NONAUTO W/O SCOPE: CPT | Performed by: FAMILY MEDICINE

## 2024-01-16 PROCEDURE — 81001 URINALYSIS AUTO W/SCOPE: CPT

## 2024-01-16 PROCEDURE — 99214 OFFICE O/P EST MOD 30 MIN: CPT | Performed by: FAMILY MEDICINE

## 2024-01-16 RX ORDER — FLUCONAZOLE 100 MG/1
100 TABLET ORAL DAILY
Qty: 10 TABLET | Refills: 0 | Status: SHIPPED | OUTPATIENT
Start: 2024-01-16 | End: 2024-01-26

## 2024-01-16 RX ORDER — METRONIDAZOLE 500 MG/1
500 TABLET ORAL EVERY 8 HOURS SCHEDULED
Qty: 21 TABLET | Refills: 1 | Status: SHIPPED | OUTPATIENT
Start: 2024-01-16 | End: 2024-01-23

## 2024-01-16 NOTE — ASSESSMENT & PLAN NOTE
This is associated with dysuria and frequency as well.  She has minor blood which she feels comes from the urethra no clarence vaginal bleeding but does have a discharge.

## 2024-01-16 NOTE — PROGRESS NOTES
Assessment/Plan:       Problem List Items Addressed This Visit          Digestive    Grade I hemorrhoids     Use Preparation H and add hydrocortisone cream            Endocrine    Type II diabetes mellitus with hyperosmolarity, uncontrolled ([facility])     Uncontrolled diabetes with vaginitis now.  Patient has been soaking in the tub 2-3 times daily for relief.  Notices vaginal discharge with odor also more frequent urination with burning and blood with urination.  UA will be sent at this time patient will be seeing her gynecologist at the end of next week.  I like to prescribe her medications for bacterial vaginosis as well as antifungal tablet and cream and monitor diabetes more closely as this can worsen all of these symptoms.  She will need a repeat A1c and laboratory work  Lab Results   Component Value Date    HGBA1C 12.9 (H) 05/17/2023               Cardiovascular and Mediastinum    Essential hypertension     Monitor closely currently blood pressure under stable control            Musculoskeletal and Integument    RA (rheumatoid arthritis) ([facility])     Follow-up as scheduled upcoming visit in February            Genitourinary    Gross hematuria     Urinalysis sent out for culture start medication now            Other    Vaginal bleeding     This is associated with dysuria and frequency as well.  She has minor blood which she feels comes from the urethra no clarence vaginal bleeding but does have a discharge.          Other Visit Diagnoses       Hematuria, unspecified type    -  Primary    Relevant Orders    POCT urine dip (Completed)    Bacterial vaginitis        Relevant Medications    metroNIDAZOLE (FLAGYL) 500 mg tablet    fluconazole (DIFLUCAN) 100 mg tablet    miconazole (MONISTAT-7) 2 % vaginal cream    Vaginal candidiasis        Relevant Medications    metroNIDAZOLE (FLAGYL) 500 mg tablet    fluconazole (DIFLUCAN) 100 mg tablet    miconazole (MONISTAT-7) 2 % vaginal cream              Subjective:      Patient ID:  "Corinne Khalil is a 58 y.o. female.    Patient came in with her  but complains of several problems and needed follow-up evaluation today prior to her upcoming visit    Blood in Urine  Irritative symptoms do not include frequency. Associated symptoms include dysuria. Pertinent negatives include no abdominal pain, chills, fever or nausea.       The following portions of the patient's history were reviewed and updated as appropriate: allergies, current medications, past family history, past medical history, past social history, past surgical history and problem list.    Review of Systems   Constitutional:  Negative for chills, fatigue and fever.   HENT:  Negative for congestion, nosebleeds, rhinorrhea, sinus pressure and sore throat.    Eyes:  Negative for discharge and redness.   Respiratory:  Negative for cough and shortness of breath.    Cardiovascular:  Negative for chest pain, palpitations and leg swelling.   Gastrointestinal:  Negative for abdominal pain, blood in stool and nausea.   Endocrine: Negative for cold intolerance, heat intolerance and polyuria.   Genitourinary:  Positive for dysuria, hematuria, vaginal bleeding and vaginal discharge. Negative for frequency.   Musculoskeletal:  Negative for arthralgias, back pain and myalgias.   Skin:  Negative for rash.   Neurological:  Negative for dizziness, weakness and headaches.   Hematological:  Negative for adenopathy.   Psychiatric/Behavioral:  Negative for behavioral problems and sleep disturbance. The patient is not nervous/anxious.          Objective:      /78 (BP Location: Left arm, Patient Position: Sitting)   Pulse 81   Temp 97.9 °F (36.6 °C)   Ht 5' 3\" (1.6 m)   Wt 54.2 kg (119 lb 6.4 oz)   SpO2 99%   BMI 21.15 kg/m²        Physical Exam  Vitals and nursing note reviewed.   Constitutional:       General: She is not in acute distress.     Appearance: Normal appearance. She is well-developed.   HENT:      Head: Normocephalic and " atraumatic.      Right Ear: External ear normal.      Left Ear: External ear normal.      Nose: Nose normal.      Mouth/Throat:      Mouth: Mucous membranes are moist.      Pharynx: Oropharynx is clear. No oropharyngeal exudate.   Eyes:      General: No scleral icterus.        Right eye: No discharge.         Left eye: No discharge.      Conjunctiva/sclera: Conjunctivae normal.      Pupils: Pupils are equal, round, and reactive to light.   Neck:      Thyroid: No thyromegaly.      Vascular: No JVD.   Cardiovascular:      Rate and Rhythm: Normal rate and regular rhythm.      Heart sounds: Normal heart sounds. No murmur heard.  Pulmonary:      Effort: Pulmonary effort is normal.      Breath sounds: No wheezing or rales.   Chest:      Chest wall: No tenderness.   Abdominal:      General: Bowel sounds are normal. There is no distension.      Palpations: Abdomen is soft. There is no mass.      Tenderness: There is no abdominal tenderness.   Musculoskeletal:         General: No tenderness or deformity. Normal range of motion.      Cervical back: Normal range of motion.   Lymphadenopathy:      Cervical: No cervical adenopathy.   Skin:     General: Skin is warm and dry.      Findings: No rash.   Neurological:      General: No focal deficit present.      Mental Status: She is alert and oriented to person, place, and time.      Cranial Nerves: No cranial nerve deficit.      Coordination: Coordination normal.      Deep Tendon Reflexes: Reflexes are normal and symmetric. Reflexes normal.   Psychiatric:         Mood and Affect: Mood normal.         Behavior: Behavior normal.         Thought Content: Thought content normal.         Judgment: Judgment normal.          Data:    Laboratory Results: I have personally reviewed the pertinent laboratory results/reports   Radiology/Other Diagnostic Testing Results: I have personally reviewed pertinent reports.       Lab Results   Component Value Date    WBC 5.41 05/18/2023    HGB 12.0  "05/18/2023    HCT 36.6 05/18/2023    MCV 88 05/18/2023     05/18/2023     Lab Results   Component Value Date     05/13/2015    K 3.5 05/20/2023     (H) 05/20/2023    CO2 26 05/20/2023    ANIONGAP 5 05/13/2015    BUN 4 (L) 05/20/2023    CREATININE 0.46 (L) 05/20/2023    GLUCOSE 194 (H) 05/13/2015    GLUF 222 (H) 12/14/2022    CALCIUM 8.5 05/20/2023    CORRECTEDCA 9.0 05/18/2023    AST 10 (L) 05/18/2023    ALT 9 05/18/2023    ALKPHOS 77 05/18/2023    PROT 7.8 05/13/2015    BILITOT 0.3 05/13/2015    EGFR 110 05/20/2023     Lab Results   Component Value Date    CHOLESTEROL 186 12/14/2022    CHOLESTEROL 241 (H) 08/20/2021    CHOLESTEROL 217 (H) 03/13/2021     Lab Results   Component Value Date    HDL 49 (L) 12/14/2022    HDL 56 08/20/2021    HDL 58 03/13/2021     Lab Results   Component Value Date    LDLCALC  12/14/2022      Comment:      Calculated LDL invalid, triglycerides >400 mg/dl  This screening LDL is a calculated result.   It does not have the accuracy of the Direct Measured LDL in the monitoring of patients with hyperlipidemia and/or statin therapy.   Direct Measure LDL (NFL933) must be ordered separately in these patients.    LDLCALC 157 (H) 08/20/2021    LDLCALC 131 (H) 03/13/2021     Lab Results   Component Value Date    TRIG 411 (H) 12/14/2022    TRIG 142 08/20/2021    TRIG 139 03/13/2021     No results found for: \"CHOLHDL\"  Lab Results   Component Value Date    ZNL7FOJYTOLD 1.090 12/14/2022     Lab Results   Component Value Date    HGBA1C 12.9 (H) 05/17/2023     No results found for: \"PSA\"    Kwaku Lee, DO      "

## 2024-01-16 NOTE — ASSESSMENT & PLAN NOTE
Uncontrolled diabetes with vaginitis now.  Patient has been soaking in the tub 2-3 times daily for relief.  Notices vaginal discharge with odor also more frequent urination with burning and blood with urination.  UA will be sent at this time patient will be seeing her gynecologist at the end of next week.  I like to prescribe her medications for bacterial vaginosis as well as antifungal tablet and cream and monitor diabetes more closely as this can worsen all of these symptoms.  She will need a repeat A1c and laboratory work  Lab Results   Component Value Date    HGBA1C 12.9 (H) 05/17/2023

## 2024-01-17 LAB
BACTERIA UR QL AUTO: ABNORMAL /HPF
BILIRUB UR QL STRIP: NEGATIVE
CLARITY UR: CLEAR
COLOR UR: COLORLESS
GLUCOSE UR STRIP-MCNC: ABNORMAL MG/DL
HGB UR QL STRIP.AUTO: NEGATIVE
KETONES UR STRIP-MCNC: NEGATIVE MG/DL
LEUKOCYTE ESTERASE UR QL STRIP: ABNORMAL
NITRITE UR QL STRIP: NEGATIVE
NON-SQ EPI CELLS URNS QL MICRO: ABNORMAL /HPF
PH UR STRIP.AUTO: 5.5 [PH]
PROT UR STRIP-MCNC: NEGATIVE MG/DL
RBC #/AREA URNS AUTO: ABNORMAL /HPF
SP GR UR STRIP.AUTO: 1.04 (ref 1–1.03)
UROBILINOGEN UR STRIP-ACNC: <2 MG/DL
WBC #/AREA URNS AUTO: ABNORMAL /HPF

## 2024-01-19 ENCOUNTER — TELEPHONE (OUTPATIENT)
Dept: FAMILY MEDICINE CLINIC | Facility: CLINIC | Age: 59
End: 2024-01-19

## 2024-01-27 ENCOUNTER — APPOINTMENT (OUTPATIENT)
Dept: LAB | Facility: CLINIC | Age: 59
End: 2024-01-27
Payer: COMMERCIAL

## 2024-01-27 DIAGNOSIS — Z79.4 TYPE 2 DIABETES MELLITUS WITH HYPERGLYCEMIA, WITH LONG-TERM CURRENT USE OF INSULIN (HCC): ICD-10-CM

## 2024-01-27 DIAGNOSIS — E11.65 TYPE 2 DIABETES MELLITUS WITH HYPERGLYCEMIA, WITH LONG-TERM CURRENT USE OF INSULIN (HCC): ICD-10-CM

## 2024-01-27 LAB
ALBUMIN SERPL BCP-MCNC: 4.1 G/DL (ref 3.5–5)
ALP SERPL-CCNC: 93 U/L (ref 34–104)
ALT SERPL W P-5'-P-CCNC: 21 U/L (ref 7–52)
ANION GAP SERPL CALCULATED.3IONS-SCNC: 9 MMOL/L
AST SERPL W P-5'-P-CCNC: 16 U/L (ref 13–39)
BILIRUB SERPL-MCNC: 0.62 MG/DL (ref 0.2–1)
BUN SERPL-MCNC: 18 MG/DL (ref 5–25)
CALCIUM SERPL-MCNC: 9.3 MG/DL (ref 8.4–10.2)
CHLORIDE SERPL-SCNC: 99 MMOL/L (ref 96–108)
CHOLEST SERPL-MCNC: 218 MG/DL
CO2 SERPL-SCNC: 29 MMOL/L (ref 21–32)
CREAT SERPL-MCNC: 0.55 MG/DL (ref 0.6–1.3)
CREAT UR-MCNC: 100 MG/DL
EST. AVERAGE GLUCOSE BLD GHB EST-MCNC: 326 MG/DL
GFR SERPL CREATININE-BSD FRML MDRD: 104 ML/MIN/1.73SQ M
GLUCOSE P FAST SERPL-MCNC: 298 MG/DL (ref 65–99)
HBA1C MFR BLD: 13 %
HDLC SERPL-MCNC: 61 MG/DL
LDLC SERPL CALC-MCNC: 130 MG/DL (ref 0–100)
MICROALBUMIN UR-MCNC: 7.6 MG/L
MICROALBUMIN/CREAT 24H UR: 8 MG/G CREATININE (ref 0–30)
POTASSIUM SERPL-SCNC: 4.5 MMOL/L (ref 3.5–5.3)
PROT SERPL-MCNC: 6.9 G/DL (ref 6.4–8.4)
SODIUM SERPL-SCNC: 137 MMOL/L (ref 135–147)
TRIGL SERPL-MCNC: 137 MG/DL
TSH SERPL DL<=0.05 MIU/L-ACNC: 0.99 UIU/ML (ref 0.45–4.5)

## 2024-01-27 PROCEDURE — 3061F NEG MICROALBUMINURIA REV: CPT | Performed by: STUDENT IN AN ORGANIZED HEALTH CARE EDUCATION/TRAINING PROGRAM

## 2024-01-27 PROCEDURE — 80053 COMPREHEN METABOLIC PANEL: CPT

## 2024-01-27 PROCEDURE — 83036 HEMOGLOBIN GLYCOSYLATED A1C: CPT

## 2024-01-27 PROCEDURE — 3046F HEMOGLOBIN A1C LEVEL >9.0%: CPT | Performed by: STUDENT IN AN ORGANIZED HEALTH CARE EDUCATION/TRAINING PROGRAM

## 2024-01-27 PROCEDURE — 80061 LIPID PANEL: CPT

## 2024-01-27 PROCEDURE — 84443 ASSAY THYROID STIM HORMONE: CPT

## 2024-01-27 PROCEDURE — 36415 COLL VENOUS BLD VENIPUNCTURE: CPT

## 2024-01-29 ENCOUNTER — OFFICE VISIT (OUTPATIENT)
Dept: OBGYN CLINIC | Facility: CLINIC | Age: 59
End: 2024-01-29
Payer: COMMERCIAL

## 2024-01-29 VITALS
BODY MASS INDEX: 21.26 KG/M2 | WEIGHT: 120 LBS | HEIGHT: 63 IN | DIASTOLIC BLOOD PRESSURE: 70 MMHG | SYSTOLIC BLOOD PRESSURE: 114 MMHG

## 2024-01-29 DIAGNOSIS — Z12.4 SCREENING FOR CERVICAL CANCER: ICD-10-CM

## 2024-01-29 DIAGNOSIS — R93.89 THICKENED ENDOMETRIUM: Primary | ICD-10-CM

## 2024-01-29 DIAGNOSIS — B37.31 YEAST VAGINITIS: ICD-10-CM

## 2024-01-29 DIAGNOSIS — N95.0 POSTMENOPAUSAL BLEEDING: ICD-10-CM

## 2024-01-29 PROCEDURE — G0476 HPV COMBO ASSAY CA SCREEN: HCPCS | Performed by: STUDENT IN AN ORGANIZED HEALTH CARE EDUCATION/TRAINING PROGRAM

## 2024-01-29 PROCEDURE — 88305 TISSUE EXAM BY PATHOLOGIST: CPT | Performed by: PATHOLOGY

## 2024-01-29 PROCEDURE — 58100 BIOPSY OF UTERUS LINING: CPT | Performed by: STUDENT IN AN ORGANIZED HEALTH CARE EDUCATION/TRAINING PROGRAM

## 2024-01-29 PROCEDURE — 99214 OFFICE O/P EST MOD 30 MIN: CPT | Performed by: STUDENT IN AN ORGANIZED HEALTH CARE EDUCATION/TRAINING PROGRAM

## 2024-01-29 PROCEDURE — G0145 SCR C/V CYTO,THINLAYER,RESCR: HCPCS | Performed by: STUDENT IN AN ORGANIZED HEALTH CARE EDUCATION/TRAINING PROGRAM

## 2024-01-29 RX ORDER — FLUCONAZOLE 150 MG/1
150 TABLET ORAL
Qty: 2 TABLET | Refills: 0 | Status: SHIPPED | OUTPATIENT
Start: 2024-01-29 | End: 2024-02-02

## 2024-01-29 NOTE — PROGRESS NOTES
"[facility] [address], [place]    Assessment/Plan:  Corinne Khalil is a 58 y.o.  who presents as a new patient for postmenopausal bleeding and thickened endometrium.    Thickened endometrium  - We reviewed her thickened endometrial lining on US in  which had no follow up.  Given her current postmenopausal bleeding, I recommended endometrial sampling.  - Endometrial biopsy was performed in the office today.    Diagnoses and all orders for this visit:    Thickened endometrium  -     Tissue Exam    Postmenopausal bleeding    Screening for cervical cancer  -     Liquid-based pap, screening; Future  -     Liquid-based pap, screening    Yeast vaginitis  -     fluconazole (DIFLUCAN) 150 mg tablet; Take 1 tablet (150 mg total) by mouth every third day for 2 doses          Subjective:   Corinne Khalil is a 58 y.o.  female.  CC: PMB    HPI: Corinne presents with postmenopausal bleeding. It is a small amount. She also reports rectal bleeding.      Addtionally she reports a vaginal yeast infection that has not cleared with topical therapy.        ROS: Review of Systems   Constitutional:  Negative for chills and fever.   Respiratory:  Negative for cough and shortness of breath.    Cardiovascular:  Negative for chest pain and leg swelling.   Gastrointestinal:  Negative for abdominal pain, nausea and vomiting.   Genitourinary:  Negative for dysuria, frequency and urgency.   Neurological:  Negative for dizziness, light-headedness and headaches.       PFSH: The following portions of the patient's history were reviewed and updated as appropriate: allergies, current medications, past family history, past medical history, obstetric history, gynecologic history, past social history, past surgical history and problem list.       Objective:  /70   Ht 5' 3\" (1.6 m)   Wt 54.4 kg (120 lb)   BMI 21.26 kg/m²    Physical Exam  Constitutional:       Appearance: Normal appearance. " "  Cardiovascular:      Rate and Rhythm: Normal rate.   Pulmonary:      Effort: Pulmonary effort is normal.   Genitourinary:     Vagina: Vaginal discharge (Yeast) present.      Comments: Normal external female genitalia. Normal Bartholin's and La Plant's glands. Normal urethral meatus and no evidence of urethral discharge or masses. No visible vulvar lesions.  Bladder without fullness mass or tenderness. Vagina without lesion. No vaginal bleeding. No significant pelvic organ prolapse noted. Cervix grossly normal appearing without visible lesions.   Bimanual exam reveals mobile uterus of expected size. Anus without fissure of lesion.  Neurological:      Mental Status: She is alert.           Endometrial biopsy    Date/Time: 1/29/2024 7:40 AM    Performed by: Ninfa Lovett MD  Authorized by: Ninfa Lovett MD  Universal Protocol:  Consent: Written consent obtained.  Risks and benefits: risks, benefits and alternatives were discussed  Consent given by: patient  Time out: Immediately prior to procedure a \"time out\" was called to verify the correct patient, procedure, equipment, support staff and site/side marked as required.  Timeout called at: 1/29/2024 7:40 AM.  Patient understanding: patient states understanding of the procedure being performed  Patient consent: the patient's understanding of the procedure matches consent given  Procedure consent: procedure consent matches procedure scheduled  Relevant documents: relevant documents present and verified  Test results: test results available and properly labeled  Required items: required blood products, implants, devices, and special equipment available  Patient identity confirmed: verbally with patient    Indication:     Indications: Post-menopausal bleeding      Chronicity of post-menopausal bleeding:  New  Procedure:     Procedure: endometrial biopsy with Pipelle      A bivalve speculum was placed in the vagina: yes      Cervix cleaned and prepped: yes      " Uterus sound depth (cm):  6    Specimen collected: specimen collected and sent to pathology      Patient tolerated procedure well with no complications: yes      Unable to perform due to: pain    Findings:     Uterus size:  Non-gravid    Cervix: normal      Adnexa: normal      Ninfa Lovett MD  [facility]  [facility]  1/29/2024 1:49 PM

## 2024-01-29 NOTE — ASSESSMENT & PLAN NOTE
- We reviewed her thickened endometrial lining on US in 2022 which had no follow up.  Given her current postmenopausal bleeding, I recommended endometrial sampling.  - Endometrial biopsy was performed in the office today.

## 2024-01-30 LAB
HPV HR 12 DNA CVX QL NAA+PROBE: NEGATIVE
HPV16 DNA CVX QL NAA+PROBE: NEGATIVE
HPV18 DNA CVX QL NAA+PROBE: NEGATIVE

## 2024-01-31 PROCEDURE — 88305 TISSUE EXAM BY PATHOLOGIST: CPT | Performed by: PATHOLOGY

## 2024-02-01 LAB
LAB AP GYN PRIMARY INTERPRETATION: NORMAL
Lab: NORMAL

## 2024-02-08 ENCOUNTER — OFFICE VISIT (OUTPATIENT)
Dept: FAMILY MEDICINE CLINIC | Facility: CLINIC | Age: 59
End: 2024-02-08
Payer: COMMERCIAL

## 2024-02-08 VITALS
SYSTOLIC BLOOD PRESSURE: 120 MMHG | HEIGHT: 63 IN | RESPIRATION RATE: 18 BRPM | BODY MASS INDEX: 20.91 KG/M2 | HEART RATE: 60 BPM | TEMPERATURE: 98.2 F | WEIGHT: 118 LBS | OXYGEN SATURATION: 99 % | DIASTOLIC BLOOD PRESSURE: 60 MMHG

## 2024-02-08 DIAGNOSIS — E11.65 TYPE II DIABETES MELLITUS WITH HYPEROSMOLARITY, UNCONTROLLED (HCC): ICD-10-CM

## 2024-02-08 DIAGNOSIS — B37.31 VAGINAL CANDIDIASIS: ICD-10-CM

## 2024-02-08 DIAGNOSIS — K59.01 SLOW TRANSIT CONSTIPATION: ICD-10-CM

## 2024-02-08 DIAGNOSIS — I63.9 CEREBROVASCULAR ACCIDENT (CVA), UNSPECIFIED MECHANISM (HCC): ICD-10-CM

## 2024-02-08 DIAGNOSIS — K21.9 GASTROESOPHAGEAL REFLUX DISEASE WITHOUT ESOPHAGITIS: ICD-10-CM

## 2024-02-08 DIAGNOSIS — E11.65 UNCONTROLLED TYPE 2 DIABETES MELLITUS WITH HYPERGLYCEMIA (HCC): Primary | ICD-10-CM

## 2024-02-08 DIAGNOSIS — E11.00 TYPE II DIABETES MELLITUS WITH HYPEROSMOLARITY, UNCONTROLLED (HCC): ICD-10-CM

## 2024-02-08 DIAGNOSIS — I10 ESSENTIAL HYPERTENSION: ICD-10-CM

## 2024-02-08 PROCEDURE — 99214 OFFICE O/P EST MOD 30 MIN: CPT | Performed by: FAMILY MEDICINE

## 2024-02-08 RX ORDER — FLUCONAZOLE 100 MG/1
100 TABLET ORAL DAILY
Qty: 7 TABLET | Refills: 1 | Status: SHIPPED | OUTPATIENT
Start: 2024-02-08 | End: 2024-02-22

## 2024-02-08 RX ORDER — ATORVASTATIN CALCIUM 10 MG/1
10 TABLET, FILM COATED ORAL DAILY
Qty: 90 TABLET | Refills: 3 | Status: SHIPPED | OUTPATIENT
Start: 2024-02-08

## 2024-02-08 RX ORDER — INSULIN DEGLUDEC 200 U/ML
INJECTION, SOLUTION SUBCUTANEOUS
Qty: 12 ML | Refills: 3 | Status: SHIPPED | OUTPATIENT
Start: 2024-02-08

## 2024-02-08 RX ORDER — INSULIN ASPART 100 [IU]/ML
10 INJECTION, SOLUTION INTRAVENOUS; SUBCUTANEOUS
Qty: 30 ML | Refills: 2 | Status: SHIPPED | OUTPATIENT
Start: 2024-02-08

## 2024-02-08 NOTE — ASSESSMENT & PLAN NOTE
Diabetes under poor control now patient has not been using her medications adequately concerned about checking her blood glucose without a meter or monitor which I do recommend.  I will renew her insulin now and adjust medications follow-up closely with laboratory work every 3 months  Lab Results   Component Value Date    HGBA1C 13.0 (H) 01/27/2024

## 2024-02-08 NOTE — ASSESSMENT & PLAN NOTE
Vaginal candidiasis unresolved after gynecology exam and treatment provided with 2 fluconazole tablets to take it once and she notes that this did not clear up the infection.  I discussed the difficulty with uncontrolled diabetes and she will need to take the fluconazole for at least 1 week and get started on the insulin right away

## 2024-02-08 NOTE — PROGRESS NOTES
Assessment/Plan:       Problem List Items Addressed This Visit          Digestive    GERD (gastroesophageal reflux disease)    Relevant Medications    atorvastatin (LIPITOR) 10 mg tablet    insulin degludec (Tresiba FlexTouch) 200 units/mL CONCENTRATED U-200 injection pen    Slow transit constipation     Likely contributed from uncontrolled diabetes start MiraLAX daily add Colace         Relevant Medications    atorvastatin (LIPITOR) 10 mg tablet       Endocrine    Uncontrolled type 2 diabetes mellitus with hyperglycemia ([facility]) - Primary     Diabetes under poor control now patient has not been using her medications adequately concerned about checking her blood glucose without a meter or monitor which I do recommend.  I will renew her insulin now and adjust medications follow-up closely with laboratory work every 3 months  Lab Results   Component Value Date    HGBA1C 13.0 (H) 01/27/2024            Relevant Medications    atorvastatin (LIPITOR) 10 mg tablet    insulin aspart (NovoLOG FlexPen) 100 UNIT/ML injection pen    insulin degludec (Tresiba FlexTouch) 200 units/mL CONCENTRATED U-200 injection pen    Continuous Blood Gluc  (FreeStyle [place] 2 [place]) MARGOTH    Continuous Blood Gluc Sensor (FreeStyle [place] 2 Sensor) MISC    Other Relevant Orders    Albumin / creatinine urine ratio    Comprehensive metabolic panel    Hemoglobin A1C    Lipid Panel with Direct LDL reflex    TSH, 3rd generation with Free T4 reflex    Type II diabetes mellitus with hyperosmolarity, uncontrolled ([facility]CC)    Relevant Medications    atorvastatin (LIPITOR) 10 mg tablet    insulin aspart (NovoLOG FlexPen) 100 UNIT/ML injection pen    insulin degludec (Tresiba FlexTouch) 200 units/mL CONCENTRATED U-200 injection pen       Cardiovascular and Mediastinum    Essential hypertension     Hypertension stable continuous with same medications follow-up in 3 months avoid sodium         Relevant Medications    atorvastatin (LIPITOR) 10 mg tablet     insulin degludec (Tresiba FlexTouch) 200 units/mL CONCENTRATED U-200 injection pen    Other Relevant Orders    Albumin / creatinine urine ratio    Comprehensive metabolic panel    Hemoglobin A1C    Lipid Panel with Direct LDL reflex    TSH, 3rd generation with Free T4 reflex    Stroke ([facility]CC)     Start Lipitor now 10 mg daily         Relevant Medications    atorvastatin (LIPITOR) 10 mg tablet    Other Relevant Orders    Albumin / creatinine urine ratio    Comprehensive metabolic panel    Hemoglobin A1C    Lipid Panel with Direct LDL reflex    TSH, 3rd generation with Free T4 reflex       Genitourinary    Vaginal candidiasis     Vaginal candidiasis unresolved after gynecology exam and treatment provided with 2 fluconazole tablets to take it once and she notes that this did not clear up the infection.  I discussed the difficulty with uncontrolled diabetes and she will need to take the fluconazole for at least 1 week and get started on the insulin right away         Relevant Medications    atorvastatin (LIPITOR) 10 mg tablet    fluconazole (DIFLUCAN) 100 mg tablet    Other Relevant Orders    Albumin / creatinine urine ratio    Comprehensive metabolic panel    Hemoglobin A1C    Lipid Panel with Direct LDL reflex    TSH, 3rd generation with Free T4 reflex         Subjective:      Patient ID: Corinne Khalil is a 58 y.o. female.    Patient in for follow-up evaluation uncontrolled diabetes resistant yeast infection and discussion regarding history of recent stroke and both her sister and brother have had both heart disease myocardial infarction and stroke        The following portions of the patient's history were reviewed and updated as appropriate: allergies, current medications, past family history, past medical history, past social history, past surgical history and problem list.    Review of Systems   Constitutional:  Negative for chills, fatigue and fever.   HENT:  Negative for congestion, nosebleeds, rhinorrhea, sinus  "pressure and sore throat.    Eyes:  Negative for discharge and redness.   Respiratory:  Negative for cough and shortness of breath.    Cardiovascular:  Negative for chest pain, palpitations and leg swelling.   Gastrointestinal:  Negative for abdominal pain, blood in stool and nausea.   Endocrine: Negative for cold intolerance, heat intolerance and polyuria.   Genitourinary:  Negative for dysuria and frequency.   Musculoskeletal:  Negative for arthralgias, back pain and myalgias.   Skin:  Negative for rash.   Neurological:  Negative for dizziness, weakness and headaches.   Hematological:  Negative for adenopathy.   Psychiatric/Behavioral:  Negative for behavioral problems and sleep disturbance. The patient is not nervous/anxious.          Objective:      /60 (BP Location: Left arm, Patient Position: Sitting, Cuff Size: Standard)   Pulse 60   Temp 98.2 °F (36.8 °C) (Tympanic)   Resp 18   Ht 5' 3\" (1.6 m)   Wt 53.5 kg (118 lb)   SpO2 99%   BMI 20.90 kg/m²        Physical Exam  Vitals and nursing note reviewed.   Constitutional:       General: She is not in acute distress.     Appearance: Normal appearance. She is well-developed.   HENT:      Head: Normocephalic and atraumatic.      Right Ear: External ear normal.      Left Ear: External ear normal.      Nose: Nose normal.      Mouth/Throat:      Mouth: Mucous membranes are moist.      Pharynx: Oropharynx is clear. No oropharyngeal exudate.   Eyes:      General: No scleral icterus.        Right eye: No discharge.         Left eye: No discharge.      Conjunctiva/sclera: Conjunctivae normal.      Pupils: Pupils are equal, round, and reactive to light.   Neck:      Thyroid: No thyromegaly.      Vascular: No JVD.   Cardiovascular:      Rate and Rhythm: Normal rate and regular rhythm.      Pulses: Normal pulses.      Heart sounds: Normal heart sounds. No murmur heard.  Pulmonary:      Effort: Pulmonary effort is normal.      Breath sounds: No wheezing or rales. "   Chest:      Chest wall: No tenderness.   Abdominal:      General: Bowel sounds are normal. There is no distension.      Palpations: Abdomen is soft. There is no mass.      Tenderness: There is no abdominal tenderness.   Musculoskeletal:         General: No tenderness or deformity. Normal range of motion.      Cervical back: Normal range of motion.   Lymphadenopathy:      Cervical: No cervical adenopathy.   Skin:     General: Skin is warm and dry.      Findings: No rash.   Neurological:      General: No focal deficit present.      Mental Status: She is alert and oriented to person, place, and time.      Cranial Nerves: No cranial nerve deficit.      Coordination: Coordination normal.      Deep Tendon Reflexes: Reflexes are normal and symmetric. Reflexes normal.   Psychiatric:         Mood and Affect: Mood normal.         Behavior: Behavior normal.         Thought Content: Thought content normal.         Judgment: Judgment normal.          Data:    Laboratory Results: I have personally reviewed the pertinent laboratory results/reports   Radiology/Other Diagnostic Testing Results: I have personally reviewed pertinent reports.       Lab Results   Component Value Date    WBC 5.41 05/18/2023    HGB 12.0 05/18/2023    HCT 36.6 05/18/2023    MCV 88 05/18/2023     05/18/2023     Lab Results   Component Value Date     05/13/2015    K 4.5 01/27/2024    CL 99 01/27/2024    CO2 29 01/27/2024    ANIONGAP 5 05/13/2015    BUN 18 01/27/2024    CREATININE 0.55 (L) 01/27/2024    GLUCOSE 194 (H) 05/13/2015    GLUF 298 (H) 01/27/2024    CALCIUM 9.3 01/27/2024    CORRECTEDCA 9.0 05/18/2023    AST 16 01/27/2024    ALT 21 01/27/2024    ALKPHOS 93 01/27/2024    PROT 7.8 05/13/2015    BILITOT 0.3 05/13/2015    EGFR 104 01/27/2024     Lab Results   Component Value Date    CHOLESTEROL 218 (H) 01/27/2024    CHOLESTEROL 186 12/14/2022    CHOLESTEROL 241 (H) 08/20/2021     Lab Results   Component Value Date    HDL 61 01/27/2024     "HDL 49 (L) 12/14/2022    HDL 56 08/20/2021     Lab Results   Component Value Date    LDLCALC 130 (H) 01/27/2024    LDLCALC  12/14/2022      Comment:      Calculated LDL invalid, triglycerides >400 mg/dl  This screening LDL is a calculated result.   It does not have the accuracy of the Direct Measured LDL in the monitoring of patients with hyperlipidemia and/or statin therapy.   Direct Measure LDL (SAH291) must be ordered separately in these patients.    LDLCALC 157 (H) 08/20/2021     Lab Results   Component Value Date    TRIG 137 01/27/2024    TRIG 411 (H) 12/14/2022    TRIG 142 08/20/2021     No results found for: \"CHOLHDL\"  Lab Results   Component Value Date    LWW2JURYHBUW 0.987 01/27/2024     Lab Results   Component Value Date    HGBA1C 13.0 (H) 01/27/2024     No results found for: \"PSA\"    Kwaku Lee,       "

## 2024-02-09 ENCOUNTER — OFFICE VISIT (OUTPATIENT)
Dept: GASTROENTEROLOGY | Facility: CLINIC | Age: 59
End: 2024-02-09
Payer: COMMERCIAL

## 2024-02-09 VITALS — SYSTOLIC BLOOD PRESSURE: 120 MMHG | DIASTOLIC BLOOD PRESSURE: 79 MMHG | OXYGEN SATURATION: 98 % | HEART RATE: 68 BPM

## 2024-02-09 DIAGNOSIS — K62.5 BRBPR (BRIGHT RED BLOOD PER RECTUM): Primary | ICD-10-CM

## 2024-02-09 DIAGNOSIS — R10.30 LOWER ABDOMINAL PAIN: ICD-10-CM

## 2024-02-09 DIAGNOSIS — Z86.010 HISTORY OF COLON POLYPS: ICD-10-CM

## 2024-02-09 DIAGNOSIS — K92.1 MELENA: ICD-10-CM

## 2024-02-09 DIAGNOSIS — K59.09 OTHER CONSTIPATION: ICD-10-CM

## 2024-02-09 DIAGNOSIS — R63.4 WEIGHT LOSS: ICD-10-CM

## 2024-02-09 DIAGNOSIS — Z12.11 SCREENING FOR COLON CANCER: ICD-10-CM

## 2024-02-09 DIAGNOSIS — K21.9 GASTROESOPHAGEAL REFLUX DISEASE, UNSPECIFIED WHETHER ESOPHAGITIS PRESENT: ICD-10-CM

## 2024-02-09 DIAGNOSIS — K92.1 BLOOD IN THE STOOL: ICD-10-CM

## 2024-02-09 PROCEDURE — 99244 OFF/OP CNSLTJ NEW/EST MOD 40: CPT | Performed by: INTERNAL MEDICINE

## 2024-02-09 NOTE — LETTER
February 9, 2024     Kwaku Lee DO  [address] [place], [place] [id number]    Patient: Corinne Khalil   YOB: 1965   Date of Visit: 2/9/2024       Dear Dr. Lee:    Thank you for referring Corinne Khalil to me for evaluation. Below are my notes for this consultation.    If you have questions, please do not hesitate to call me. I look forward to following your patient along with you.         Sincerely,        Andrae Joseph MD        CC: No Recipients    Andrae Joseph MD  2/9/2024  7:53 AM  Incomplete  [facility]    Dear Dr. Lee,    I had the pleasure of seeing your patient Corinne Khalil in the office today and I thank you for this kind referral.       Chief Complaint: Bleeding      HPI:  Corinne Khalil is a 58 y.o. female who presents with several GI problems.  The patient has been complaining of significant heartburn and reflux.  No dysphagia or odynophagia.  However she has had weight loss with this.  She has been taking PPIs.  Patient also has been having significant constipation.  She has to occasionally disimpact himself.  She sees both black stool and bright red blood in the stool and on the toilet paper.  Last colonoscopy was apparently in 2017 at which time polyps were removed.  Patient has had a weight loss recently but has not quantified this.  She was seen in the ER in May where an abnormal CT scan showing inflamed small bowel loops was seen.  This is apparently not a new finding.  However the patient recently has been having intermittent lower abdominal pain when she gets constipated.  There is no other associated GI history.  She has had a CVA in the past.  She currently denies any chest pain shortness of breath dizziness or any other issues..      Review of Systems:   Constitutional: No fever or chills, feels well, no tiredness, no recent weight gain or weight loss.   HENT: No complaints of earache, no hearing loss, no nosebleeds,  no nasal discharge, no sore throat, no hoarseness.    Eyes: No complaints of eye pain, no red eyes, no discharge from eyes, no itchy eyes.  Cardiovascular: No complaints of slow heart rate, no fast heart rate, no chest pain, no palpitations, no leg claudication, no lower extremity edema.   Respiratory: No complaints of shortness of breath, no wheezing, no cough, no SOB on exertion, no orthopnea.   Gastrointestinal: As noted in HPI  Genitourinary: No complaints of dysuria, no incontinence, no hesitancy, no nocturia.   Musculoskeletal: Positive arthralgia, no myalgias, no joint swelling or stiffness, no limb pain or swelling.   Neurological: No complaints of headache, no confusion, no convulsions, no numbness or tingling, no dizziness or fainting, no limb weakness, no difficulty walking.    Skin: No complaints of skin rash or skin lesions, no itching, no skin wound, no dry skin.    Hematological/Lymphatic: No complaints of swollen glands, does not bleed easy.   Allergic/Immunologic: No immunocompromised state.  Endocrine:  No complaints of polyuria, no polydipsia.   Psychiatric/Behavioral: is not suicidal, no sleep disturbances, no anxiety or depression, no change in personality, no emotional problems.       Historical Information  Past Medical History:   Diagnosis Date   • Arthritis    • Colonic polyp     resolved: 08/25/2017   • Diabetes mellitus (HCC)    • Dry eye syndrome of bilateral lacrimal glands     last assessed: 05/15/2015   • GERD (gastroesophageal reflux disease)    • History of screening mammography     last assessed: 07/25/2016   • Hypertension    • Lyme disease    • Osteoarthritis     last assessed: 07/25/2016   • RA (rheumatoid arthritis) ([facility])    • Rheumatoid arteritis (HCC)    • Rheumatoid arthritis (HCC)     osteoarthritis   • Stroke ([facility])    • Symptomatic hypotension     last assessed: 03/22/2017   • Ventral hernia with obstruction and without gangrene     last assessed: 02/16/2017   • Vertigo       Past Surgical History:   Procedure Laterality Date   • BACK SURGERY     • CYST REMOVAL     • EGD AND COLONOSCOPY N/A 2/3/2017    Procedure: COLONOSCOPY; POSSIBLE POLYPECTOMY; POSSIBLE BIOPSY AND EGD ;  Surgeon: Feliberto Manrique MD;  Location: [facility];  Service:    • HERNIA REPAIR     • LIPOMA RESECTION     • OTHER SURGICAL HISTORY      excision of sebaceous cst to back   • TUBAL LIGATION     • VENTRAL HERNIA REPAIR N/A 3/17/2017    Procedure: LAPAROSCOPIC VENTRAL HERNIA REPAIR WITH MESH ;  Surgeon: Feliberto Manrique MD;  Location: [facility];  Service:      Social History  Social History     Substance and Sexual Activity   Alcohol Use Not Currently    Comment: Stopped drinking alcohol      Social History     Substance and Sexual Activity   Drug Use No     Social History     Tobacco Use   Smoking Status Former   • Current packs/day: 0.00   • Average packs/day: 0.3 packs/day for 2.0 years (0.5 ttl pk-yrs)   • Types: Cigarettes   • Start date:    • Quit date:    • Years since quittin.1   Smokeless Tobacco Never     Family History   Problem Relation Age of Onset   • COPD Mother         COPD/Emphysema   • Diabetes Mother    • Heart disease Father    • Stroke Father    • COPD Father         COPD/Emphysema   • Heart attack Father    • Diabetes Paternal Grandmother    • Breast cancer Paternal Aunt    • Depression Daughter          Current Medications: has a current medication list which includes the following prescription(s): ascorbic acid, aspirin, atorvastatin, biotin w/ vitamins c & e, one touch ultra 2, calcium carbonate, freestyle jaye 2 reader, freestyle jaye 2 sensor, cyanocobalamin, dimenhydrinate, docusate sodium, fluconazole, onetouch ultra, hydrocortisone, insulin aspart, tresiba flextouch, insulin lispro, bd pen needle rohan 2nd gen, metformin, methylcellulose, miconazole, multiple vitamin, fish oil, omeprazole, and onetouch delica lancets 33g.       Vital Signs: /79   Pulse 68   SpO2  98%     Physical Exam:   Constitutional  General Appearance: No acute distress, well appearing and well nourished  Head  Normocephalic  Eyes  Conjunctivae and lids: No swelling, erythema, or discharge.  Positive divergent strabismus..    Pupils and irises: Equal, round and reactive to light.   Ears, Nose, Mouth, and Throat  External inspection of ears and nose: Normal  Nasal mucosa, septum and turbinates: Normal without edema or erythema/   Oropharynx: Normal with no erythema, edema, exudate or lesions.   Neck  Normal range of motion. Neck supple.   Cardiovascular  Auscultation of the heart: Normal rate and rhythm, normal S1 and S2 without murmurs.  Examination of the extremities for edema and/or varicosities: Normal  Pulmonary/Chest  Respiratory effort: No increased work of breathing or signs of respiratory distress.   Auscultation of lungs: Clear to auscultation, equal breath sounds bilaterally, no wheezes, rales, no rhonchi.   Abdomen  Abdomen: Non-tender, no masses.  Increased tympanitic note with mild bloating.  Liver and spleen: No hepatomegaly or splenomegaly.   Musculoskeletal  Gait and station: normal.  Digits and Nails: normal without clubbing or cyanosis.  Inspection/palpation of joints, bones, and muscles: Normal  Neurological  No nystagmus or asterixis.   Skin  Skin and subcutaneous tissue: Normal without rashes or lesions.   Lymphatic  Palpation of the lymph nodes in neck: No lymphadenopathy.   Psychiatric  Orientation to person, place and time: Normal.  Mood and affect: Normal.         Labs:   Lab Results   Component Value Date    ALT 21 01/27/2024    AST 16 01/27/2024    BUN 18 01/27/2024    CALCIUM 9.3 01/27/2024    CL 99 01/27/2024    CO2 29 01/27/2024    CREATININE 0.55 (L) 01/27/2024    HDL 61 01/27/2024    HCT 36.6 05/18/2023    HGB 12.0 05/18/2023    HGBA1C 13.0 (H) 01/27/2024    MG 1.9 05/13/2015     05/18/2023    K 4.5 01/27/2024     05/13/2015    TRIG 137 01/27/2024    WBC 5.41  05/18/2023         X-Rays & Procedures:   No orders to display       CT abdomen pelvis wo contrast  Status: Final result     PACS Images     Show images for CT abdomen pelvis wo contrast  Study Result    Narrative & Impression   CT ABDOMEN AND PELVIS WITHOUT IV CONTRAST     INDICATION:   Inflammatory bowel disease (IBD)  Re-eval severe inflammatory change in the mid abdomen surrounded by bowel loops. R/o intraloop abscess..     COMPARISON: CT abdomen/pelvis dated 5/17/2023.     TECHNIQUE:  CT examination of the abdomen and pelvis was performed without intravenous contrast. Multiplanar 2D reformatted images were created from the source data.     This examination, like all CT scans performed in the [facility], was performed utilizing techniques to minimize radiation dose exposure, including the use of iterative reconstruction and automated exposure control. Radiation dose length   product (DLP) for this visit:  641 mGy-cm     Enteric contrast was administered.     FINDINGS:     ABDOMEN     LOWER CHEST:  No clinically significant abnormality identified in the visualized lower chest.     LIVER/BILIARY TREE:  Unremarkable.     GALLBLADDER:  No calcified gallstones. No pericholecystic inflammatory change.     SPLEEN:  Unremarkable.     PANCREAS:  Unremarkable.     ADRENAL GLANDS:  Unremarkable.     KIDNEYS/URETERS: Punctate nonobstructing right intrarenal calculus. No hydronephrosis.     STOMACH AND BOWEL: No evidence for bowel obstruction. Again demonstrated are severe inflammatory changes associated with multiple small bowel loops clustered within the right anterior abdomen. Although lack of intravenous contrast limits evaluation for   abscess, there is no definite focal drainable fluid collection identified.     APPENDIX:  No findings to suggest appendicitis.     ABDOMINOPELVIC CAVITY: Small pelvic free fluid. No pneumoperitoneum.  No lymphadenopathy.     VESSELS:  Unremarkable for patient's age.      PELVIS     REPRODUCTIVE ORGANS:  Unremarkable for patient's age.     URINARY BLADDER:  Unremarkable.     ABDOMINAL WALL/INGUINAL REGIONS:  Unremarkable.     OSSEOUS STRUCTURES:  No acute fracture or destructive osseous lesion.     IMPRESSION:     Redemonstrated severe inflammatory changes associated with multiple small bowel loops clustered within the right anterior abdomen. Although lack of intravenous contrast limits evaluation for rim-enhancing abscess, there is no definite focal drainable   fluid collection identified. Small pelvic free fluid.     No evidence for bowel obstruction.     Punctate nonobstructing right intrarenal calculus.        ______________________________________________________________________      Assessment & Plan:      Diagnoses and all orders for this visit:    BRBPR (bright red blood per rectum)  -     Colonoscopy; Future    Screening for colon cancer  -     Ambulatory Referral to Gastroenterology    Blood in the stool  -     Ambulatory Referral to Gastroenterology  -     Colonoscopy; Future    Melena  -     EGD; Future    Gastroesophageal reflux disease, unspecified whether esophagitis present  -     EGD; Future    Weight loss  -     EGD; Future    Lower abdominal pain  -     Colonoscopy; Future    Other constipation  -     Colonoscopy; Future    History of colon polyps  -     Colonoscopy; Future    I have taken the liberty of scheduling the patient for both EGD and colonoscopy.  I will be happy to inform you of her results and further recommendations.  In the meanwhile she will remain on her current medical regimen.  I would like to thank you for allowing me to participate in her care.                With warmest regards,    Andrae Joseph MD, FACG

## 2024-02-09 NOTE — PROGRESS NOTES
[facility]    Dear Dr. Lee,    I had the pleasure of seeing your patient Corinne Khalil in the office today and I thank you for this kind referral.       Chief Complaint: Bleeding      HPI:  Corinne Khalil is a 58 y.o. female who presents with several GI problems.  The patient has been complaining of significant heartburn and reflux.  No dysphagia or odynophagia.  However she has had weight loss with this.  She has been taking PPIs.  Patient also has been having significant constipation.  She has to occasionally disimpact himself.  She sees both black stool and bright red blood in the stool and on the toilet paper.  Last colonoscopy was apparently in 2017 at which time polyps were removed.  Patient has had a weight loss recently but has not quantified this.  She was seen in the ER in May where an abnormal CT scan showing inflamed small bowel loops was seen.  This is apparently not a new finding.  However the patient recently has been having intermittent lower abdominal pain when she gets constipated.  There is no other associated GI history.  She has had a CVA in the past.  She currently denies any chest pain shortness of breath dizziness or any other issues..      Review of Systems:   Constitutional: No fever or chills, feels well, no tiredness, no recent weight gain or weight loss.   HENT: No complaints of earache, no hearing loss, no nosebleeds, no nasal discharge, no sore throat, no hoarseness.    Eyes: No complaints of eye pain, no red eyes, no discharge from eyes, no itchy eyes.  Cardiovascular: No complaints of slow heart rate, no fast heart rate, no chest pain, no palpitations, no leg claudication, no lower extremity edema.   Respiratory: No complaints of shortness of breath, no wheezing, no cough, no SOB on exertion, no orthopnea.   Gastrointestinal: As noted in HPI  Genitourinary: No complaints of dysuria, no incontinence, no hesitancy, no nocturia.   Musculoskeletal:  Positive arthralgia, no myalgias, no joint swelling or stiffness, no limb pain or swelling.   Neurological: No complaints of headache, no confusion, no convulsions, no numbness or tingling, no dizziness or fainting, no limb weakness, no difficulty walking.    Skin: No complaints of skin rash or skin lesions, no itching, no skin wound, no dry skin.    Hematological/Lymphatic: No complaints of swollen glands, does not bleed easy.   Allergic/Immunologic: No immunocompromised state.  Endocrine:  No complaints of polyuria, no polydipsia.   Psychiatric/Behavioral: is not suicidal, no sleep disturbances, no anxiety or depression, no change in personality, no emotional problems.       Historical Information   Past Medical History:   Diagnosis Date    Arthritis     Colonic polyp     resolved: 08/25/2017    Diabetes mellitus (HCC)     Dry eye syndrome of bilateral lacrimal glands     last assessed: 05/15/2015    GERD (gastroesophageal reflux disease)     History of screening mammography     last assessed: 07/25/2016    Hypertension     Lyme disease     Osteoarthritis     last assessed: 07/25/2016    RA (rheumatoid arthritis) (HCC)     Rheumatoid arteritis (HCC)     Rheumatoid arthritis (HCC)     osteoarthritis    Stroke (HCC)     Symptomatic hypotension     last assessed: 03/22/2017    Ventral hernia with obstruction and without gangrene     last assessed: 02/16/2017    Vertigo      Past Surgical History:   Procedure Laterality Date    BACK SURGERY      CYST REMOVAL      EGD AND COLONOSCOPY N/A 2/3/2017    Procedure: COLONOSCOPY; POSSIBLE POLYPECTOMY; POSSIBLE BIOPSY AND EGD ;  Surgeon: Feliberto Manrique MD;  Location: [facility];  Service:     HERNIA REPAIR      LIPOMA RESECTION      OTHER SURGICAL HISTORY      excision of sebaceous cst to back    TUBAL LIGATION      VENTRAL HERNIA REPAIR N/A 3/17/2017    Procedure: LAPAROSCOPIC VENTRAL HERNIA REPAIR WITH MESH ;  Surgeon: Feliberto Manrique MD;  Location: [facility];  Service:       Social History   Social History     Substance and Sexual Activity   Alcohol Use Not Currently    Comment: Stopped drinking alcohol      Social History     Substance and Sexual Activity   Drug Use No     Social History     Tobacco Use   Smoking Status Former    Current packs/day: 0.00    Average packs/day: 0.3 packs/day for 2.0 years (0.5 ttl pk-yrs)    Types: Cigarettes    Start date:     Quit date:     Years since quittin.1   Smokeless Tobacco Never     Family History   Problem Relation Age of Onset    COPD Mother         COPD/Emphysema    Diabetes Mother     Heart disease Father     Stroke Father     COPD Father         COPD/Emphysema    Heart attack Father     Diabetes Paternal Grandmother     Breast cancer Paternal Aunt     Depression Daughter          Current Medications: has a current medication list which includes the following prescription(s): ascorbic acid, aspirin, atorvastatin, biotin w/ vitamins c & e, one touch ultra 2, calcium carbonate, freestyle jaye 2 reader, freestyle jaye 2 sensor, cyanocobalamin, dimenhydrinate, docusate sodium, fluconazole, onetouch ultra, hydrocortisone, insulin aspart, tresiba flextouch, insulin lispro, bd pen needle rohan 2nd gen, metformin, methylcellulose, miconazole, multiple vitamin, fish oil, omeprazole, and onetouch delica lancets 33g.       Vital Signs: /79   Pulse 68   SpO2 98%     Physical Exam:   Constitutional  General Appearance: No acute distress, well appearing and well nourished  Head  Normocephalic  Eyes  Conjunctivae and lids: No swelling, erythema, or discharge.  Positive divergent strabismus..    Pupils and irises: Equal, round and reactive to light.   Ears, Nose, Mouth, and Throat  External inspection of ears and nose: Normal  Nasal mucosa, septum and turbinates: Normal without edema or erythema/   Oropharynx: Normal with no erythema, edema, exudate or lesions.   Neck  Normal range of motion. Neck supple.    Cardiovascular  Auscultation of the heart: Normal rate and rhythm, normal S1 and S2 without murmurs.  Examination of the extremities for edema and/or varicosities: Normal  Pulmonary/Chest  Respiratory effort: No increased work of breathing or signs of respiratory distress.   Auscultation of lungs: Clear to auscultation, equal breath sounds bilaterally, no wheezes, rales, no rhonchi.   Abdomen  Abdomen: Non-tender, no masses.  Increased tympanitic note with mild bloating.  Liver and spleen: No hepatomegaly or splenomegaly.   Musculoskeletal  Gait and station: normal.  Digits and Nails: normal without clubbing or cyanosis.  Inspection/palpation of joints, bones, and muscles: Normal  Neurological  No nystagmus or asterixis.   Skin  Skin and subcutaneous tissue: Normal without rashes or lesions.   Lymphatic  Palpation of the lymph nodes in neck: No lymphadenopathy.   Psychiatric  Orientation to person, place and time: Normal.  Mood and affect: Normal.         Labs:   Lab Results   Component Value Date    ALT 21 01/27/2024    AST 16 01/27/2024    BUN 18 01/27/2024    CALCIUM 9.3 01/27/2024    CL 99 01/27/2024    CO2 29 01/27/2024    CREATININE 0.55 (L) 01/27/2024    HDL 61 01/27/2024    HCT 36.6 05/18/2023    HGB 12.0 05/18/2023    HGBA1C 13.0 (H) 01/27/2024    MG 1.9 05/13/2015     05/18/2023    K 4.5 01/27/2024     05/13/2015    TRIG 137 01/27/2024    WBC 5.41 05/18/2023         X-Rays & Procedures:   No orders to display       CT abdomen pelvis wo contrast  Status: Final result     PACS Images     Show images for CT abdomen pelvis wo contrast  Study Result    Narrative & Impression   CT ABDOMEN AND PELVIS WITHOUT IV CONTRAST     INDICATION:   Inflammatory bowel disease (IBD)  Re-eval severe inflammatory change in the mid abdomen surrounded by bowel loops. R/o intraloop abscess..     COMPARISON: CT abdomen/pelvis dated 5/17/2023.     TECHNIQUE:  CT examination of the abdomen and pelvis was performed  without intravenous contrast. Multiplanar 2D reformatted images were created from the source data.     This examination, like all CT scans performed in the [facility], was performed utilizing techniques to minimize radiation dose exposure, including the use of iterative reconstruction and automated exposure control. Radiation dose length   product (DLP) for this visit:  641 mGy-cm     Enteric contrast was administered.     FINDINGS:     ABDOMEN     LOWER CHEST:  No clinically significant abnormality identified in the visualized lower chest.     LIVER/BILIARY TREE:  Unremarkable.     GALLBLADDER:  No calcified gallstones. No pericholecystic inflammatory change.     SPLEEN:  Unremarkable.     PANCREAS:  Unremarkable.     ADRENAL GLANDS:  Unremarkable.     KIDNEYS/URETERS: Punctate nonobstructing right intrarenal calculus. No hydronephrosis.     STOMACH AND BOWEL: No evidence for bowel obstruction. Again demonstrated are severe inflammatory changes associated with multiple small bowel loops clustered within the right anterior abdomen. Although lack of intravenous contrast limits evaluation for   abscess, there is no definite focal drainable fluid collection identified.     APPENDIX:  No findings to suggest appendicitis.     ABDOMINOPELVIC CAVITY: Small pelvic free fluid. No pneumoperitoneum.  No lymphadenopathy.     VESSELS:  Unremarkable for patient's age.     PELVIS     REPRODUCTIVE ORGANS:  Unremarkable for patient's age.     URINARY BLADDER:  Unremarkable.     ABDOMINAL WALL/INGUINAL REGIONS:  Unremarkable.     OSSEOUS STRUCTURES:  No acute fracture or destructive osseous lesion.     IMPRESSION:     Redemonstrated severe inflammatory changes associated with multiple small bowel loops clustered within the right anterior abdomen. Although lack of intravenous contrast limits evaluation for rim-enhancing abscess, there is no definite focal drainable   fluid collection identified. Small pelvic free  fluid.     No evidence for bowel obstruction.     Punctate nonobstructing right intrarenal calculus.        ______________________________________________________________________      Assessment & Plan:      Diagnoses and all orders for this visit:    BRBPR (bright red blood per rectum)  -     Colonoscopy; Future    Screening for colon cancer  -     Ambulatory Referral to Gastroenterology    Blood in the stool  -     Ambulatory Referral to Gastroenterology  -     Colonoscopy; Future    Melena  -     EGD; Future    Gastroesophageal reflux disease, unspecified whether esophagitis present  -     EGD; Future    Weight loss  -     EGD; Future    Lower abdominal pain  -     Colonoscopy; Future    Other constipation  -     Colonoscopy; Future    History of colon polyps  -     Colonoscopy; Future    I have taken the liberty of scheduling the patient for both EGD and colonoscopy.  I will be happy to inform you of her results and further recommendations.  In the meanwhile she will remain on her current medical regimen.  I would like to thank you for allowing me to participate in her care.                With warmest regards,    Andrae Joseph MD, FACG

## 2024-02-09 NOTE — H&P (VIEW-ONLY)
[facility]    Dear Dr. Lee,    I had the pleasure of seeing your patient Corinne Khalil in the office today and I thank you for this kind referral.       Chief Complaint: Bleeding      HPI:  Corinne Khalil is a 58 y.o. female who presents with several GI problems.  The patient has been complaining of significant heartburn and reflux.  No dysphagia or odynophagia.  However she has had weight loss with this.  She has been taking PPIs.  Patient also has been having significant constipation.  She has to occasionally disimpact himself.  She sees both black stool and bright red blood in the stool and on the toilet paper.  Last colonoscopy was apparently in 2017 at which time polyps were removed.  Patient has had a weight loss recently but has not quantified this.  She was seen in the ER in May where an abnormal CT scan showing inflamed small bowel loops was seen.  This is apparently not a new finding.  However the patient recently has been having intermittent lower abdominal pain when she gets constipated.  There is no other associated GI history.  She has had a CVA in the past.  She currently denies any chest pain shortness of breath dizziness or any other issues..      Review of Systems:   Constitutional: No fever or chills, feels well, no tiredness, no recent weight gain or weight loss.   HENT: No complaints of earache, no hearing loss, no nosebleeds, no nasal discharge, no sore throat, no hoarseness.    Eyes: No complaints of eye pain, no red eyes, no discharge from eyes, no itchy eyes.  Cardiovascular: No complaints of slow heart rate, no fast heart rate, no chest pain, no palpitations, no leg claudication, no lower extremity edema.   Respiratory: No complaints of shortness of breath, no wheezing, no cough, no SOB on exertion, no orthopnea.   Gastrointestinal: As noted in HPI  Genitourinary: No complaints of dysuria, no incontinence, no hesitancy, no nocturia.   Musculoskeletal:  Positive arthralgia, no myalgias, no joint swelling or stiffness, no limb pain or swelling.   Neurological: No complaints of headache, no confusion, no convulsions, no numbness or tingling, no dizziness or fainting, no limb weakness, no difficulty walking.    Skin: No complaints of skin rash or skin lesions, no itching, no skin wound, no dry skin.    Hematological/Lymphatic: No complaints of swollen glands, does not bleed easy.   Allergic/Immunologic: No immunocompromised state.  Endocrine:  No complaints of polyuria, no polydipsia.   Psychiatric/Behavioral: is not suicidal, no sleep disturbances, no anxiety or depression, no change in personality, no emotional problems.       Historical Information   Past Medical History:   Diagnosis Date    Arthritis     Colonic polyp     resolved: 08/25/2017    Diabetes mellitus (HCC)     Dry eye syndrome of bilateral lacrimal glands     last assessed: 05/15/2015    GERD (gastroesophageal reflux disease)     History of screening mammography     last assessed: 07/25/2016    Hypertension     Lyme disease     Osteoarthritis     last assessed: 07/25/2016    RA (rheumatoid arthritis) (HCC)     Rheumatoid arteritis (HCC)     Rheumatoid arthritis (HCC)     osteoarthritis    Stroke (HCC)     Symptomatic hypotension     last assessed: 03/22/2017    Ventral hernia with obstruction and without gangrene     last assessed: 02/16/2017    Vertigo      Past Surgical History:   Procedure Laterality Date    BACK SURGERY      CYST REMOVAL      EGD AND COLONOSCOPY N/A 2/3/2017    Procedure: COLONOSCOPY; POSSIBLE POLYPECTOMY; POSSIBLE BIOPSY AND EGD ;  Surgeon: Feliberto Manrique MD;  Location: [facility];  Service:     HERNIA REPAIR      LIPOMA RESECTION      OTHER SURGICAL HISTORY      excision of sebaceous cst to back    TUBAL LIGATION      VENTRAL HERNIA REPAIR N/A 3/17/2017    Procedure: LAPAROSCOPIC VENTRAL HERNIA REPAIR WITH MESH ;  Surgeon: Feliberto Manrique MD;  Location: [facility];  Service:       Social History   Social History     Substance and Sexual Activity   Alcohol Use Not Currently    Comment: Stopped drinking alcohol      Social History     Substance and Sexual Activity   Drug Use No     Social History     Tobacco Use   Smoking Status Former    Current packs/day: 0.00    Average packs/day: 0.3 packs/day for 2.0 years (0.5 ttl pk-yrs)    Types: Cigarettes    Start date:     Quit date:     Years since quittin.1   Smokeless Tobacco Never     Family History   Problem Relation Age of Onset    COPD Mother         COPD/Emphysema    Diabetes Mother     Heart disease Father     Stroke Father     COPD Father         COPD/Emphysema    Heart attack Father     Diabetes Paternal Grandmother     Breast cancer Paternal Aunt     Depression Daughter          Current Medications: has a current medication list which includes the following prescription(s): ascorbic acid, aspirin, atorvastatin, biotin w/ vitamins c & e, one touch ultra 2, calcium carbonate, freestyle jaye 2 reader, freestyle jaye 2 sensor, cyanocobalamin, dimenhydrinate, docusate sodium, fluconazole, onetouch ultra, hydrocortisone, insulin aspart, tresiba flextouch, insulin lispro, bd pen needle rohan 2nd gen, metformin, methylcellulose, miconazole, multiple vitamin, fish oil, omeprazole, and onetouch delica lancets 33g.       Vital Signs: /79   Pulse 68   SpO2 98%     Physical Exam:   Constitutional  General Appearance: No acute distress, well appearing and well nourished  Head  Normocephalic  Eyes  Conjunctivae and lids: No swelling, erythema, or discharge.  Positive divergent strabismus..    Pupils and irises: Equal, round and reactive to light.   Ears, Nose, Mouth, and Throat  External inspection of ears and nose: Normal  Nasal mucosa, septum and turbinates: Normal without edema or erythema/   Oropharynx: Normal with no erythema, edema, exudate or lesions.   Neck  Normal range of motion. Neck supple.    Cardiovascular  Auscultation of the heart: Normal rate and rhythm, normal S1 and S2 without murmurs.  Examination of the extremities for edema and/or varicosities: Normal  Pulmonary/Chest  Respiratory effort: No increased work of breathing or signs of respiratory distress.   Auscultation of lungs: Clear to auscultation, equal breath sounds bilaterally, no wheezes, rales, no rhonchi.   Abdomen  Abdomen: Non-tender, no masses.  Increased tympanitic note with mild bloating.  Liver and spleen: No hepatomegaly or splenomegaly.   Musculoskeletal  Gait and station: normal.  Digits and Nails: normal without clubbing or cyanosis.  Inspection/palpation of joints, bones, and muscles: Normal  Neurological  No nystagmus or asterixis.   Skin  Skin and subcutaneous tissue: Normal without rashes or lesions.   Lymphatic  Palpation of the lymph nodes in neck: No lymphadenopathy.   Psychiatric  Orientation to person, place and time: Normal.  Mood and affect: Normal.         Labs:   Lab Results   Component Value Date    ALT 21 01/27/2024    AST 16 01/27/2024    BUN 18 01/27/2024    CALCIUM 9.3 01/27/2024    CL 99 01/27/2024    CO2 29 01/27/2024    CREATININE 0.55 (L) 01/27/2024    HDL 61 01/27/2024    HCT 36.6 05/18/2023    HGB 12.0 05/18/2023    HGBA1C 13.0 (H) 01/27/2024    MG 1.9 05/13/2015     05/18/2023    K 4.5 01/27/2024     05/13/2015    TRIG 137 01/27/2024    WBC 5.41 05/18/2023         X-Rays & Procedures:   No orders to display       CT abdomen pelvis wo contrast  Status: Final result     PACS Images     Show images for CT abdomen pelvis wo contrast  Study Result    Narrative & Impression   CT ABDOMEN AND PELVIS WITHOUT IV CONTRAST     INDICATION:   Inflammatory bowel disease (IBD)  Re-eval severe inflammatory change in the mid abdomen surrounded by bowel loops. R/o intraloop abscess..     COMPARISON: CT abdomen/pelvis dated 5/17/2023.     TECHNIQUE:  CT examination of the abdomen and pelvis was performed  without intravenous contrast. Multiplanar 2D reformatted images were created from the source data.     This examination, like all CT scans performed in the [facility], was performed utilizing techniques to minimize radiation dose exposure, including the use of iterative reconstruction and automated exposure control. Radiation dose length   product (DLP) for this visit:  641 mGy-cm     Enteric contrast was administered.     FINDINGS:     ABDOMEN     LOWER CHEST:  No clinically significant abnormality identified in the visualized lower chest.     LIVER/BILIARY TREE:  Unremarkable.     GALLBLADDER:  No calcified gallstones. No pericholecystic inflammatory change.     SPLEEN:  Unremarkable.     PANCREAS:  Unremarkable.     ADRENAL GLANDS:  Unremarkable.     KIDNEYS/URETERS: Punctate nonobstructing right intrarenal calculus. No hydronephrosis.     STOMACH AND BOWEL: No evidence for bowel obstruction. Again demonstrated are severe inflammatory changes associated with multiple small bowel loops clustered within the right anterior abdomen. Although lack of intravenous contrast limits evaluation for   abscess, there is no definite focal drainable fluid collection identified.     APPENDIX:  No findings to suggest appendicitis.     ABDOMINOPELVIC CAVITY: Small pelvic free fluid. No pneumoperitoneum.  No lymphadenopathy.     VESSELS:  Unremarkable for patient's age.     PELVIS     REPRODUCTIVE ORGANS:  Unremarkable for patient's age.     URINARY BLADDER:  Unremarkable.     ABDOMINAL WALL/INGUINAL REGIONS:  Unremarkable.     OSSEOUS STRUCTURES:  No acute fracture or destructive osseous lesion.     IMPRESSION:     Redemonstrated severe inflammatory changes associated with multiple small bowel loops clustered within the right anterior abdomen. Although lack of intravenous contrast limits evaluation for rim-enhancing abscess, there is no definite focal drainable   fluid collection identified. Small pelvic free  fluid.     No evidence for bowel obstruction.     Punctate nonobstructing right intrarenal calculus.        ______________________________________________________________________      Assessment & Plan:      Diagnoses and all orders for this visit:    BRBPR (bright red blood per rectum)  -     Colonoscopy; Future    Screening for colon cancer  -     Ambulatory Referral to Gastroenterology    Blood in the stool  -     Ambulatory Referral to Gastroenterology  -     Colonoscopy; Future    Melena  -     EGD; Future    Gastroesophageal reflux disease, unspecified whether esophagitis present  -     EGD; Future    Weight loss  -     EGD; Future    Lower abdominal pain  -     Colonoscopy; Future    Other constipation  -     Colonoscopy; Future    History of colon polyps  -     Colonoscopy; Future    I have taken the liberty of scheduling the patient for both EGD and colonoscopy.  I will be happy to inform you of her results and further recommendations.  In the meanwhile she will remain on her current medical regimen.  I would like to thank you for allowing me to participate in her care.                With warmest regards,    Andrae Joseph MD, FACG

## 2024-02-09 NOTE — PATIENT INSTRUCTIONS
Scheduled date of EGD/colonoscopy (as of today): 3/7/24  Physician performing EGD/colonoscopy: O[place]a  Location of EGD/colonoscopy: [facility]  Desired bowel prep reviewed with patient: Miralax  Instructions reviewed with patient by: Neli UMANZOR  Clearances:

## 2024-03-07 ENCOUNTER — HOSPITAL ENCOUNTER (OUTPATIENT)
Dept: GASTROENTEROLOGY | Facility: HOSPITAL | Age: 59
Setting detail: OUTPATIENT SURGERY
End: 2024-03-07
Attending: INTERNAL MEDICINE
Payer: COMMERCIAL

## 2024-03-07 ENCOUNTER — ANESTHESIA EVENT (OUTPATIENT)
Dept: GASTROENTEROLOGY | Facility: HOSPITAL | Age: 59
End: 2024-03-07

## 2024-03-07 ENCOUNTER — ANESTHESIA (OUTPATIENT)
Dept: GASTROENTEROLOGY | Facility: HOSPITAL | Age: 59
End: 2024-03-07

## 2024-03-07 VITALS
WEIGHT: 123.68 LBS | HEART RATE: 73 BPM | HEIGHT: 63 IN | TEMPERATURE: 97.7 F | DIASTOLIC BLOOD PRESSURE: 56 MMHG | OXYGEN SATURATION: 95 % | RESPIRATION RATE: 16 BRPM | SYSTOLIC BLOOD PRESSURE: 114 MMHG | BODY MASS INDEX: 21.91 KG/M2

## 2024-03-07 DIAGNOSIS — K62.5 BRBPR (BRIGHT RED BLOOD PER RECTUM): ICD-10-CM

## 2024-03-07 DIAGNOSIS — Z86.010 HISTORY OF COLON POLYPS: ICD-10-CM

## 2024-03-07 DIAGNOSIS — K21.9 GASTROESOPHAGEAL REFLUX DISEASE, UNSPECIFIED WHETHER ESOPHAGITIS PRESENT: ICD-10-CM

## 2024-03-07 DIAGNOSIS — R63.4 WEIGHT LOSS: ICD-10-CM

## 2024-03-07 DIAGNOSIS — K92.1 MELENA: ICD-10-CM

## 2024-03-07 DIAGNOSIS — K21.9 GASTROESOPHAGEAL REFLUX DISEASE WITHOUT ESOPHAGITIS: ICD-10-CM

## 2024-03-07 DIAGNOSIS — K92.1 BLOOD IN THE STOOL: ICD-10-CM

## 2024-03-07 DIAGNOSIS — K59.09 OTHER CONSTIPATION: ICD-10-CM

## 2024-03-07 DIAGNOSIS — R10.30 LOWER ABDOMINAL PAIN: ICD-10-CM

## 2024-03-07 LAB — GLUCOSE SERPL-MCNC: 167 MG/DL (ref 65–140)

## 2024-03-07 PROCEDURE — 88312 SPECIAL STAINS GROUP 1: CPT | Performed by: STUDENT IN AN ORGANIZED HEALTH CARE EDUCATION/TRAINING PROGRAM

## 2024-03-07 PROCEDURE — 88342 IMHCHEM/IMCYTCHM 1ST ANTB: CPT | Performed by: STUDENT IN AN ORGANIZED HEALTH CARE EDUCATION/TRAINING PROGRAM

## 2024-03-07 PROCEDURE — 82948 REAGENT STRIP/BLOOD GLUCOSE: CPT

## 2024-03-07 PROCEDURE — 88313 SPECIAL STAINS GROUP 2: CPT | Performed by: STUDENT IN AN ORGANIZED HEALTH CARE EDUCATION/TRAINING PROGRAM

## 2024-03-07 PROCEDURE — 88305 TISSUE EXAM BY PATHOLOGIST: CPT | Performed by: STUDENT IN AN ORGANIZED HEALTH CARE EDUCATION/TRAINING PROGRAM

## 2024-03-07 RX ORDER — ONDANSETRON 2 MG/ML
4 INJECTION INTRAMUSCULAR; INTRAVENOUS ONCE AS NEEDED
Status: CANCELLED | OUTPATIENT
Start: 2024-03-07

## 2024-03-07 RX ORDER — SODIUM CHLORIDE, SODIUM LACTATE, POTASSIUM CHLORIDE, CALCIUM CHLORIDE 600; 310; 30; 20 MG/100ML; MG/100ML; MG/100ML; MG/100ML
INJECTION, SOLUTION INTRAVENOUS CONTINUOUS PRN
Status: DISCONTINUED | OUTPATIENT
Start: 2024-03-07 | End: 2024-03-07

## 2024-03-07 RX ORDER — PHENYLEPHRINE HCL IN 0.9% NACL 1 MG/10 ML
SYRINGE (ML) INTRAVENOUS AS NEEDED
Status: DISCONTINUED | OUTPATIENT
Start: 2024-03-07 | End: 2024-03-07

## 2024-03-07 RX ORDER — GLYCOPYRROLATE 0.2 MG/ML
INJECTION INTRAMUSCULAR; INTRAVENOUS AS NEEDED
Status: DISCONTINUED | OUTPATIENT
Start: 2024-03-07 | End: 2024-03-07

## 2024-03-07 RX ORDER — LIDOCAINE HYDROCHLORIDE 20 MG/ML
INJECTION, SOLUTION EPIDURAL; INFILTRATION; INTRACAUDAL; PERINEURAL AS NEEDED
Status: DISCONTINUED | OUTPATIENT
Start: 2024-03-07 | End: 2024-03-07

## 2024-03-07 RX ORDER — PROPOFOL 10 MG/ML
INJECTION, EMULSION INTRAVENOUS AS NEEDED
Status: DISCONTINUED | OUTPATIENT
Start: 2024-03-07 | End: 2024-03-07

## 2024-03-07 RX ORDER — OMEPRAZOLE 20 MG/1
20 CAPSULE, DELAYED RELEASE ORAL
Qty: 30 CAPSULE | Refills: 5 | Status: SHIPPED | OUTPATIENT
Start: 2024-03-07

## 2024-03-07 RX ADMIN — GLYCOPYRROLATE 0.2 MG: 0.2 INJECTION, SOLUTION INTRAMUSCULAR; INTRAVENOUS at 07:49

## 2024-03-07 RX ADMIN — PROPOFOL 50 MG: 10 INJECTION, EMULSION INTRAVENOUS at 07:34

## 2024-03-07 RX ADMIN — SODIUM CHLORIDE, SODIUM LACTATE, POTASSIUM CHLORIDE, AND CALCIUM CHLORIDE: .6; .31; .03; .02 INJECTION, SOLUTION INTRAVENOUS at 07:25

## 2024-03-07 RX ADMIN — PROPOFOL 50 MG: 10 INJECTION, EMULSION INTRAVENOUS at 07:42

## 2024-03-07 RX ADMIN — PROPOFOL 50 MG: 10 INJECTION, EMULSION INTRAVENOUS at 07:37

## 2024-03-07 RX ADMIN — LIDOCAINE HYDROCHLORIDE 100 MG: 20 INJECTION, SOLUTION EPIDURAL; INFILTRATION; INTRACAUDAL; PERINEURAL at 07:29

## 2024-03-07 RX ADMIN — Medication 100 MCG: at 07:46

## 2024-03-07 RX ADMIN — PROPOFOL 150 MG: 10 INJECTION, EMULSION INTRAVENOUS at 07:30

## 2024-03-07 NOTE — ANESTHESIA PREPROCEDURE EVALUATION
Procedure:  EGD  COLONOSCOPY    Relevant Problems   ANESTHESIA (within normal limits)      CARDIO   (+) Acute midline thoracic back pain   (+) Essential hypertension   (+) Mixed hyperlipidemia      ENDO   (+) Type II diabetes mellitus with hyperosmolarity, uncontrolled (HCC)      GI/HEPATIC   (+) BRBPR (bright red blood per rectum)   (+) GERD (gastroesophageal reflux disease)   (+) Ventral hernia with obstruction and without gangrene      MUSCULOSKELETAL   (+) Acute midline thoracic back pain   (+) Osteoarthritis   (+) RA (rheumatoid arthritis) (HCC)      NEURO/PSYCH   (+) Stroke (HCC)        Physical Exam    Airway    Mallampati score: II  TM Distance: >3 FB  Neck ROM: full     Dental   No notable dental hx     Cardiovascular  Rhythm: regular, Rate: normal    Pulmonary   Breath sounds clear to auscultation    Other Findings  post-pubertal.      Anesthesia Plan  ASA Score- 3     Anesthesia Type- IV sedation with anesthesia with ASA Monitors.         Additional Monitors:     Airway Plan:            Plan Factors-Exercise tolerance (METS): >4 METS.    Chart reviewed. EKG reviewed.  Existing labs reviewed. Patient summary reviewed.    Patient is not a current smoker.              Induction-     Postoperative Plan-     Informed Consent- Anesthetic plan and risks discussed with patient.  I personally reviewed this patient with the CRNA. Discussed and agreed on the Anesthesia Plan with the CRNA..

## 2024-03-07 NOTE — INTERVAL H&P NOTE
H&P reviewed. After examining the patient I find no changes in the patients condition since the H&P had been written.    Vitals:    03/07/24 0708   BP: 137/76   Pulse: 68   Resp: 16   Temp: (!) 97.2 °F (36.2 °C)   SpO2: 99%

## 2024-03-07 NOTE — ANESTHESIA POSTPROCEDURE EVALUATION
Post-Op Assessment Note    CV Status:  Stable  Pain Score: 0    Pain management: adequate       Mental Status:  Sleepy   Hydration Status:  Euvolemic   PONV Controlled:  Controlled   Airway Patency:  Patent     Post Op Vitals Reviewed: Yes    No anethesia notable event occurred.    Staff: Anesthesiologist, CRNA               /58 (03/07/24 0751)    Temp      Pulse 55 (03/07/24 0751)   Resp 12 (03/07/24 0751)    SpO2 99 % (03/07/24 0751)

## 2024-03-12 PROCEDURE — 88342 IMHCHEM/IMCYTCHM 1ST ANTB: CPT | Performed by: STUDENT IN AN ORGANIZED HEALTH CARE EDUCATION/TRAINING PROGRAM

## 2024-03-12 PROCEDURE — 88313 SPECIAL STAINS GROUP 2: CPT | Performed by: STUDENT IN AN ORGANIZED HEALTH CARE EDUCATION/TRAINING PROGRAM

## 2024-03-12 PROCEDURE — 88312 SPECIAL STAINS GROUP 1: CPT | Performed by: STUDENT IN AN ORGANIZED HEALTH CARE EDUCATION/TRAINING PROGRAM

## 2024-03-12 PROCEDURE — 88305 TISSUE EXAM BY PATHOLOGIST: CPT | Performed by: STUDENT IN AN ORGANIZED HEALTH CARE EDUCATION/TRAINING PROGRAM

## 2024-03-13 ENCOUNTER — HOSPITAL ENCOUNTER (OUTPATIENT)
Dept: RADIOLOGY | Facility: MEDICAL CENTER | Age: 59
Discharge: HOME | End: 2024-03-13
Payer: COMMERCIAL

## 2024-03-13 VITALS — HEIGHT: 63 IN | BODY MASS INDEX: 21.79 KG/M2 | WEIGHT: 123 LBS

## 2024-03-13 DIAGNOSIS — Z12.31 VISIT FOR SCREENING MAMMOGRAM: ICD-10-CM

## 2024-03-13 PROCEDURE — 77067 SCR MAMMO BI INCL CAD: CPT

## 2024-03-13 PROCEDURE — 77063 BREAST TOMOSYNTHESIS BI: CPT

## 2024-03-20 ENCOUNTER — TELEPHONE (OUTPATIENT)
Age: 59
End: 2024-03-20

## 2024-03-20 NOTE — TELEPHONE ENCOUNTER
Please let the patient know that stomach biopsies were normal, and the polyp was completely benign.  She is to continue on the omeprazole

## 2024-03-20 NOTE — TELEPHONE ENCOUNTER
Patients GI provider:  Dr. Andrae Joseph MD     Number to return call: ( [phone]     Reason for call: Pt calling for results of colonoscopy please call     Scheduled procedure/appointment date if applicable: Apt/procedure  3/7

## 2024-04-04 ENCOUNTER — VBI (OUTPATIENT)
Dept: ADMINISTRATIVE | Facility: OTHER | Age: 59
End: 2024-04-04

## 2024-08-01 ENCOUNTER — CLINICAL SUPPORT (OUTPATIENT)
Dept: FAMILY MEDICINE CLINIC | Facility: CLINIC | Age: 59
End: 2024-08-01

## 2024-08-01 DIAGNOSIS — E11.65 UNCONTROLLED TYPE 2 DIABETES MELLITUS WITH HYPERGLYCEMIA (HCC): Primary | ICD-10-CM

## 2024-08-01 LAB
LEFT EYE DIABETIC RETINOPATHY: NORMAL
LEFT EYE IMAGE QUALITY: NORMAL
LEFT EYE MACULAR EDEMA: NORMAL
LEFT EYE OTHER RETINOPATHY: NORMAL
RIGHT EYE DIABETIC RETINOPATHY: NORMAL
RIGHT EYE IMAGE QUALITY: NORMAL
RIGHT EYE MACULAR EDEMA: NORMAL
RIGHT EYE OTHER RETINOPATHY: NORMAL
SEVERITY (EYE EXAM): NORMAL

## 2024-10-10 ENCOUNTER — VBI (OUTPATIENT)
Dept: ADMINISTRATIVE | Facility: OTHER | Age: 59
End: 2024-10-10

## 2024-10-10 NOTE — TELEPHONE ENCOUNTER
10/10/24 7:51 AM     Chart reviewed for Diabetic Eye Exam was/were submitted to the patient's insurance.     MEI MORROW MA   [facility]

## 2024-12-17 ENCOUNTER — VBI (OUTPATIENT)
Dept: ADMINISTRATIVE | Facility: OTHER | Age: 59
End: 2024-12-17

## 2024-12-17 NOTE — TELEPHONE ENCOUNTER
12/17/24 7:10 AM     Chart reviewed for Hemoglobin A1c ; nothing is submitted to the patient's insurance at this time.     Queenie Norris MA   [facility]

## 2025-03-03 ENCOUNTER — OFFICE VISIT (OUTPATIENT)
Dept: SURGERY | Facility: CLINIC | Age: 60
End: 2025-03-03
Payer: COMMERCIAL

## 2025-03-03 VITALS
OXYGEN SATURATION: 98 % | WEIGHT: 112 LBS | SYSTOLIC BLOOD PRESSURE: 120 MMHG | HEIGHT: 63 IN | BODY MASS INDEX: 19.84 KG/M2 | HEART RATE: 75 BPM | DIASTOLIC BLOOD PRESSURE: 64 MMHG | TEMPERATURE: 97.8 F

## 2025-03-03 DIAGNOSIS — L72.0 EPIDERMOID CYST OF SKIN OF BACK: Primary | ICD-10-CM

## 2025-03-03 PROCEDURE — 88305 TISSUE EXAM BY PATHOLOGIST: CPT | Performed by: PATHOLOGY

## 2025-03-03 PROCEDURE — 99203 OFFICE O/P NEW LOW 30 MIN: CPT | Performed by: SURGERY

## 2025-03-03 PROCEDURE — 21920 BIOPSY SOFT TISSUE OF BACK: CPT | Performed by: SURGERY

## 2025-03-03 NOTE — PROGRESS NOTES
Name: Corinne Khalil      : 1965      MRN: [id number]  Encounter Provider: Sylvester Rubin MD  Encounter Date: 3/3/2025   Encounter department: [facility] [place]  :  Assessment & Plan  Epidermoid cyst of skin of back  The patient is a pleasant 59-year-old female presenting with a recurrent epidermoid cyst of the upper mid back for which definitive treatment by excisional biopsy is now indicated.    The technical details of the procedure as well as the options benefits risks and alternatives to excision were thoroughly reviewed with the patient.    She understands that recurrent cyst formation remains a possibility either in the same or similar location.    All questions answered to the satisfaction of the patient and informed verbal consent obtained to proceed.             History of Present Illness   HPI  Corinne Khalil is a 59 y.o. female who presents with a cyst on upper mid back that she has been dealing with for a couple years but in the last week the cyst has gotten red and painful. Pt denies any drainage or fevers/chills. Pt has allergies to penicillin and denies any blood thinners. JOHN Faulkner  History obtained from: patient    Review of Systems   Constitutional:  Negative for chills and fever.   HENT:  Negative for ear pain and sore throat.    Eyes:  Negative for pain and visual disturbance.   Respiratory:  Negative for cough and shortness of breath.    Cardiovascular:  Negative for chest pain and palpitations.   Gastrointestinal:  Negative for abdominal pain and vomiting.   Genitourinary:  Negative for dysuria and hematuria.   Musculoskeletal:  Negative for arthralgias and back pain.   Skin:  Negative for color change and rash.   Neurological:  Negative for seizures and syncope.   All other systems reviewed and are negative.    Pertinent Medical History            Medical History Reviewed by provider this encounter:     .  Past Medical History   Past Medical History:    Diagnosis Date    Arthritis     Colonic polyp     resolved: 08/25/2017    Diabetes mellitus (HCC)     Dry eye syndrome of bilateral lacrimal glands     last assessed: 05/15/2015    GERD (gastroesophageal reflux disease)     History of screening mammography     last assessed: 07/25/2016    Hypertension     Lyme disease     Osteoarthritis     last assessed: 07/25/2016    RA (rheumatoid arthritis) (HCC)     Rheumatoid arteritis (HCC)     Rheumatoid arthritis (HCC)     osteoarthritis    Stroke (HCC)     Symptomatic hypotension     last assessed: 03/22/2017    Ventral hernia with obstruction and without gangrene     last assessed: 02/16/2017    Vertigo      Past Surgical History:   Procedure Laterality Date    BACK SURGERY      CYST REMOVAL      EGD AND COLONOSCOPY N/A 2/3/2017    Procedure: COLONOSCOPY; POSSIBLE POLYPECTOMY; POSSIBLE BIOPSY AND EGD ;  Surgeon: Feliberto Manrique MD;  Location: [facility];  Service:     HERNIA REPAIR      LIPOMA RESECTION      OTHER SURGICAL HISTORY      excision of sebaceous cst to back    TUBAL LIGATION      VENTRAL HERNIA REPAIR N/A 3/17/2017    Procedure: LAPAROSCOPIC VENTRAL HERNIA REPAIR WITH MESH ;  Surgeon: Felibreto Manrique MD;  Location: [facility];  Service:      Family History   Problem Relation Age of Onset    COPD Mother         COPD/Emphysema    Diabetes Mother     Heart disease Father     Stroke Father     COPD Father         COPD/Emphysema    Heart attack Father     No Known Problems Sister     No Known Problems Sister     No Known Problems Sister     No Known Problems Sister     Depression Daughter     No Known Problems Maternal Grandmother     No Known Problems Maternal Grandfather     Diabetes Paternal Grandmother     No Known Problems Paternal Grandfather     Breast cancer Paternal Aunt     No Known Problems Brother     No Known Problems Son     No Known Problems Maternal Aunt       reports that she quit smoking about 35 years ago. Her smoking use included cigarettes.  She started smoking about 37 years ago. She has a 0.5 pack-year smoking history. She has never used smokeless tobacco. She reports that she does not currently use alcohol. She reports that she does not use drugs.  Current Outpatient Medications   Medication Instructions    ascorbic acid (VITAMIN C) 500 mg, Oral, Daily    aspirin (ECOTRIN LOW STRENGTH) 81 mg, Daily    atorvastatin (LIPITOR) 10 mg, Oral, Daily    Biotin w/ Vitamins C & E (HAIR/SKIN/NAILS PO) 2 capsules, Daily    Blood Glucose Monitoring Suppl (ONE TOUCH ULTRA 2) w/Device KIT Does not apply, 4 times daily    calcium carbonate (TUMS) 500 mg chewable tablet 1 tablet, Daily    Continuous Blood Gluc  (FreeStyle [place] 2 [place]) MARGOTH Check blood sugar multiple times daily    Continuous Blood Gluc Sensor (FreeStyle [place] 2 Sensor) MISC 1 each, Does not apply, Every 14 days    cyanocobalamin (VITAMIN B-12) 500 mcg, Daily    dimenhyDRINATE (DRAMAMINE) 50 mg, 2 times daily    docusate sodium (COLACE) 100 mg, 2 times daily PRN    fish oil 1,000 mg, Daily    glucose blood (OneTouch Ultra) test strip Test blood sugar 3 to 4 times daily    hydrocortisone (ANUSOL-HC) 2.5 % rectal cream Topical, 2 times daily    insulin aspart (NOVOLOG FLEXPEN) 10 Units, Subcutaneous, 3 times daily with meals    insulin degludec (Tresiba FlexTouch) 200 units/mL CONCENTRATED U-200 injection pen Inject 10 units at 11:00 a.m. And 15 units at evening meal    insulin lispro (HUMALOG KWIKPEN) 10 Units, Subcutaneous, 3 times daily with meals    Insulin Pen Needle (BD Pen Needle Ignacia 2nd Gen) 32G X 4 MM MISC Subcutaneous, 3 times daily    metFORMIN (GLUCOPHAGE) 1,000 mg, Oral, 2 times daily with meals    methylcellulose (CITRUCEL) 1,000 mg, Daily    miconazole (MONISTAT-7) 2 % vaginal cream 1 applicator, Vaginal, Daily at bedtime    Multiple Vitamin (MULTI-VITAMIN DAILY PO) Take by mouth    omeprazole (PRILOSEC) 20 mg, Oral, Daily before breakfast    OneTouch Delica Lancets 33G MISC  Does not apply, 4 times daily, Test blood sugar 3 to 4 times daily     Allergies   Allergen Reactions    Penicillins Rash      Current Outpatient Medications on File Prior to Visit   Medication Sig Dispense Refill    ascorbic acid (VITAMIN C) 500 mg tablet Take 1 tablet (500 mg total) by mouth daily 60 tablet 5    aspirin (ECOTRIN LOW STRENGTH) 81 mg EC tablet Take 81 mg by mouth daily      atorvastatin (LIPITOR) 10 mg tablet Take 1 tablet (10 mg total) by mouth daily 90 tablet 3    Biotin w/ Vitamins C & E (HAIR/SKIN/NAILS PO) Take 2 capsules by mouth in the morning      Blood Glucose Monitoring Suppl (ONE TOUCH ULTRA 2) w/Device KIT Use 4 (four) times a day 1 kit 0    calcium carbonate (TUMS) 500 mg chewable tablet Chew 1 tablet daily PRN      Continuous Blood Gluc  (FreeStyle [place] 2 [place]) MARGOTH Check blood sugar multiple times daily 1 each 2    Continuous Blood Gluc Sensor (FreeStyle [place] 2 Sensor) MISC Use 1 each every 14 (fourteen) days 2 each 2    cyanocobalamin (VITAMIN B-12) 500 MCG tablet Take 500 mcg by mouth daily      dimenhyDRINATE (DRAMAMINE) 50 mg tablet Take 50 mg by mouth 2 (two) times a day        glucose blood (OneTouch Ultra) test strip Test blood sugar 3 to 4 times daily 300 each 2    hydrocortisone (ANUSOL-HC) 2.5 % rectal cream Apply topically 2 (two) times a day 28 g 0    insulin degludec (Tresiba FlexTouch) 200 units/mL CONCENTRATED U-200 injection pen Inject 10 units at 11:00 a.m. And 15 units at evening meal 12 mL 3    Insulin Pen Needle (BD Pen Needle Ignacia 2nd Gen) 32G X 4 MM MISC Inject under the skin 3 (three) times a day 100 each 11    metFORMIN (GLUCOPHAGE) 1000 MG tablet Take 1 tablet (1,000 mg total) by mouth 2 (two) times a day with meals 60 tablet 1    Omega-3 Fatty Acids (fish oil) 1,000 mg Take 1,000 mg by mouth daily      omeprazole (PriLOSEC) 20 mg delayed release capsule Take 1 capsule (20 mg total) by mouth daily before breakfast 30 capsule 5    OneTouch Delica  "Lancets 33G MISC Use 4 (four) times a day Test blood sugar 3 to 4 times daily 300 each 2    docusate sodium (COLACE) 100 mg capsule Take 100 mg by mouth 2 (two) times a day as needed   (Patient not taking: Reported on 3/3/2025)      insulin aspart (NovoLOG FlexPen) 100 UNIT/ML injection pen Inject 10 Units under the skin 3 (three) times a day with meals 30 mL 2    insulin lispro (HumaLOG KwikPen) 100 units/mL injection pen Inject 10 Units under the skin 3 (three) times a day with meals 30 mL 5    methylcellulose (CITRUCEL) 500 mg tablet Take 1,000 mg by mouth daily (Patient not taking: Reported on 3/3/2025)      miconazole (MONISTAT-7) 2 % vaginal cream Insert 1 applicator into the vagina daily at bedtime (Patient not taking: Reported on 3/3/2025) 45 g 3    Multiple Vitamin (MULTI-VITAMIN DAILY PO) Take by mouth (Patient not taking: Reported on 3/3/2025)       No current facility-administered medications on file prior to visit.      Social History     Tobacco Use    Smoking status: Former     Current packs/day: 0.00     Average packs/day: 0.3 packs/day for 2.0 years (0.5 ttl pk-yrs)     Types: Cigarettes     Start date:      Quit date:      Years since quittin.1    Smokeless tobacco: Never   Vaping Use    Vaping status: Never Used   Substance and Sexual Activity    Alcohol use: Not Currently     Comment: Stopped drinking alcohol     Drug use: No    Sexual activity: Yes     Partners: Male     Birth control/protection: Post-menopausal        Objective   /64 (BP Location: Left arm, Patient Position: Sitting, Cuff Size: Standard)   Pulse 75   Temp 97.8 °F (36.6 °C) (Temporal)   Ht 5' 3\" (1.6 m)   Wt 50.8 kg (112 lb)   SpO2 98%   BMI 19.84 kg/m²      Physical Exam  Vitals and nursing note reviewed.   Constitutional:       General: She is not in acute distress.     Appearance: She is well-developed.   HENT:      Head: Normocephalic and atraumatic.   Eyes:      Conjunctiva/sclera: Conjunctivae " "normal.   Cardiovascular:      Rate and Rhythm: Normal rate and regular rhythm.      Heart sounds: No murmur heard.  Pulmonary:      Effort: Pulmonary effort is normal. No respiratory distress.      Breath sounds: Normal breath sounds.   Abdominal:      Palpations: Abdomen is soft.      Tenderness: There is no abdominal tenderness.   Musculoskeletal:         General: No swelling.      Cervical back: Neck supple.   Skin:     General: Skin is warm and dry.      Capillary Refill: Capillary refill takes less than 2 seconds.      Comments: 28 mm x 28 mm recurrent epidermoid cysts of the upper mid back   Neurological:      Mental Status: She is alert.   Psychiatric:         Mood and Affect: Mood normal.     Biopsy    Date/Time: 3/3/2025 10:00 AM    Performed by: Sylvester Rubin MD  Authorized by: Sylvester Rubin MD  Universal Protocol:  Consent: Verbal consent obtained.  Risks and benefits: risks, benefits and alternatives were discussed  Consent given by: patient  Time out: Immediately prior to procedure a \"time out\" was called to verify the correct patient, procedure, equipment, support staff and site/side marked as required.  Timeout called at: 3/3/2025 10:36 AM.  Patient understanding: patient states understanding of the procedure being performed  Patient consent: the patient's understanding of the procedure matches consent given  Site marked: the operative site was marked  Patient identity confirmed: verbally with patient    Procedure Details - Lesion Biopsy:     Biopsy tissue type: superficial soft tissue  Soft tissue trunk location: back    Malignancy: benign lesion      Destruction method comment:  Excisional biopsy     Procedure note: With informed verbal consent under sterile conditions using aseptic technique using 1% lidocaine local anesthesia with epinephrine and bicarbonate the 28 mm epidermoid cyst was sharply excised with a #15 blade and the wound closed primarily with vertical mattress sutures of 3-0 " nylon        Administrative Statements   I have spent a total time of 20 minutes in caring for this patient on the day of the visit/encounter including Risks and benefits of tx options.

## 2025-03-03 NOTE — LETTER
March 3, 2025     Kwaku Lee, DO  [address] [place] [address]    Patient: Corinne Khalil   YOB: 1965   Date of Visit: 3/3/2025       Dear Dr. Lee:    Thank you for referring Corinne Khalil to me for evaluation. Below are my notes for this consultation.    If you have questions, please do not hesitate to call me. I look forward to following your patient along with you.         Sincerely,        Sylvester Rubin MD        CC: No Recipients    Sylvester Rubin MD  3/3/2025 10:38 AM  Sign when Signing Visit  Name: Corinne Khalil      : 1965      MRN: [id number]  Encounter Provider: Sylvester Rubin MD  Encounter Date: 3/3/2025   Encounter department: [facility] GENERAL SURGERY [place]  :  Assessment & Plan  Epidermoid cyst of skin of back  The patient is a pleasant 59-year-old female presenting with a recurrent epidermoid cyst of the upper mid back for which definitive treatment by excisional biopsy is now indicated.    The technical details of the procedure as well as the options benefits risks and alternatives to excision were thoroughly reviewed with the patient.    She understands that recurrent cyst formation remains a possibility either in the same or similar location.    All questions answered to the satisfaction of the patient and informed verbal consent obtained to proceed.             History of Present Illness  HPI  Corinne Khalil is a 59 y.o. female who presents with a cyst on upper mid back that she has been dealing with for a couple years but in the last week the cyst has gotten red and painful. Pt denies any drainage or fevers/chills. Pt has allergies to penicillin and denies any blood thinners. Amilia.O,MA  History obtained from: patient    Review of Systems   Constitutional:  Negative for chills and fever.   HENT:  Negative for ear pain and sore throat.    Eyes:  Negative for pain and visual disturbance.   Respiratory:  Negative for cough and  shortness of breath.    Cardiovascular:  Negative for chest pain and palpitations.   Gastrointestinal:  Negative for abdominal pain and vomiting.   Genitourinary:  Negative for dysuria and hematuria.   Musculoskeletal:  Negative for arthralgias and back pain.   Skin:  Negative for color change and rash.   Neurological:  Negative for seizures and syncope.   All other systems reviewed and are negative.    Pertinent Medical History           Medical History Reviewed by provider this encounter:     .  Past Medical History  Past Medical History:   Diagnosis Date   • Arthritis    • Colonic polyp     resolved: 08/25/2017   • Diabetes mellitus (HCC)    • Dry eye syndrome of bilateral lacrimal glands     last assessed: 05/15/2015   • GERD (gastroesophageal reflux disease)    • History of screening mammography     last assessed: 07/25/2016   • Hypertension    • Lyme disease    • Osteoarthritis     last assessed: 07/25/2016   • RA (rheumatoid arthritis) (HCC)    • Rheumatoid arteritis (HCC)    • Rheumatoid arthritis (HCC)     osteoarthritis   • Stroke (HCC)    • Symptomatic hypotension     last assessed: 03/22/2017   • Ventral hernia with obstruction and without gangrene     last assessed: 02/16/2017   • Vertigo      Past Surgical History:   Procedure Laterality Date   • BACK SURGERY     • CYST REMOVAL     • EGD AND COLONOSCOPY N/A 2/3/2017    Procedure: COLONOSCOPY; POSSIBLE POLYPECTOMY; POSSIBLE BIOPSY AND EGD ;  Surgeon: Feliberto Manrique MD;  Location: [facility];  Service:    • HERNIA REPAIR     • LIPOMA RESECTION     • OTHER SURGICAL HISTORY      excision of sebaceous cst to back   • TUBAL LIGATION     • VENTRAL HERNIA REPAIR N/A 3/17/2017    Procedure: LAPAROSCOPIC VENTRAL HERNIA REPAIR WITH MESH ;  Surgeon: Feliberto Manrique MD;  Location: [facility];  Service:      Family History   Problem Relation Age of Onset   • COPD Mother         COPD/Emphysema   • Diabetes Mother    • Heart disease Father    • Stroke Father    • COPD  Father         COPD/Emphysema   • Heart attack Father    • No Known Problems Sister    • No Known Problems Sister    • No Known Problems Sister    • No Known Problems Sister    • Depression Daughter    • No Known Problems Maternal Grandmother    • No Known Problems Maternal Grandfather    • Diabetes Paternal Grandmother    • No Known Problems Paternal Grandfather    • Breast cancer Paternal Aunt    • No Known Problems Brother    • No Known Problems Son    • No Known Problems Maternal Aunt       reports that she quit smoking about 35 years ago. Her smoking use included cigarettes. She started smoking about 37 years ago. She has a 0.5 pack-year smoking history. She has never used smokeless tobacco. She reports that she does not currently use alcohol. She reports that she does not use drugs.  Current Outpatient Medications   Medication Instructions   • ascorbic acid (VITAMIN C) 500 mg, Oral, Daily   • aspirin (ECOTRIN LOW STRENGTH) 81 mg, Daily   • atorvastatin (LIPITOR) 10 mg, Oral, Daily   • Biotin w/ Vitamins C & E (HAIR/SKIN/NAILS PO) 2 capsules, Daily   • Blood Glucose Monitoring Suppl (ONE TOUCH ULTRA 2) w/Device KIT Does not apply, 4 times daily   • calcium carbonate (TUMS) 500 mg chewable tablet 1 tablet, Daily   • Continuous Blood Gluc  (FreeStyle [place] 2 [place]) MARGOTH Check blood sugar multiple times daily   • Continuous Blood Gluc Sensor (FreeStyle [place] 2 Sensor) MISC 1 each, Does not apply, Every 14 days   • cyanocobalamin (VITAMIN B-12) 500 mcg, Daily   • dimenhyDRINATE (DRAMAMINE) 50 mg, 2 times daily   • docusate sodium (COLACE) 100 mg, 2 times daily PRN   • fish oil 1,000 mg, Daily   • glucose blood (OneTouch Ultra) test strip Test blood sugar 3 to 4 times daily   • hydrocortisone (ANUSOL-HC) 2.5 % rectal cream Topical, 2 times daily   • insulin aspart (NOVOLOG FLEXPEN) 10 Units, Subcutaneous, 3 times daily with meals   • insulin degludec (Tresiba FlexTouch) 200 units/mL CONCENTRATED U-200  injection pen Inject 10 units at 11:00 a.m. And 15 units at evening meal   • insulin lispro (HUMALOG KWIKPEN) 10 Units, Subcutaneous, 3 times daily with meals   • Insulin Pen Needle (BD Pen Needle Ignacia 2nd Gen) 32G X 4 MM MISC Subcutaneous, 3 times daily   • metFORMIN (GLUCOPHAGE) 1,000 mg, Oral, 2 times daily with meals   • methylcellulose (CITRUCEL) 1,000 mg, Daily   • miconazole (MONISTAT-7) 2 % vaginal cream 1 applicator, Vaginal, Daily at bedtime   • Multiple Vitamin (MULTI-VITAMIN DAILY PO) Take by mouth   • omeprazole (PRILOSEC) 20 mg, Oral, Daily before breakfast   • OneTouch Delica Lancets 33G MISC Does not apply, 4 times daily, Test blood sugar 3 to 4 times daily     Allergies   Allergen Reactions   • Penicillins Rash      Current Outpatient Medications on File Prior to Visit   Medication Sig Dispense Refill   • ascorbic acid (VITAMIN C) 500 mg tablet Take 1 tablet (500 mg total) by mouth daily 60 tablet 5   • aspirin (ECOTRIN LOW STRENGTH) 81 mg EC tablet Take 81 mg by mouth daily     • atorvastatin (LIPITOR) 10 mg tablet Take 1 tablet (10 mg total) by mouth daily 90 tablet 3   • Biotin w/ Vitamins C & E (HAIR/SKIN/NAILS PO) Take 2 capsules by mouth in the morning     • Blood Glucose Monitoring Suppl (ONE TOUCH ULTRA 2) w/Device KIT Use 4 (four) times a day 1 kit 0   • calcium carbonate (TUMS) 500 mg chewable tablet Chew 1 tablet daily PRN     • Continuous Blood Gluc  (FreeStyle [place] 2 [place]) MARGOTH Check blood sugar multiple times daily 1 each 2   • Continuous Blood Gluc Sensor (FreeStyle [place] 2 Sensor) MISC Use 1 each every 14 (fourteen) days 2 each 2   • cyanocobalamin (VITAMIN B-12) 500 MCG tablet Take 500 mcg by mouth daily     • dimenhyDRINATE (DRAMAMINE) 50 mg tablet Take 50 mg by mouth 2 (two) times a day       • glucose blood (OneTouch Ultra) test strip Test blood sugar 3 to 4 times daily 300 each 2   • hydrocortisone (ANUSOL-HC) 2.5 % rectal cream Apply topically 2 (two) times a day 28  g 0   • insulin degludec (Tresiba FlexTouch) 200 units/mL CONCENTRATED U-200 injection pen Inject 10 units at 11:00 a.m. And 15 units at evening meal 12 mL 3   • Insulin Pen Needle (BD Pen Needle Ignacia 2nd Gen) 32G X 4 MM MISC Inject under the skin 3 (three) times a day 100 each 11   • metFORMIN (GLUCOPHAGE) 1000 MG tablet Take 1 tablet (1,000 mg total) by mouth 2 (two) times a day with meals 60 tablet 1   • Omega-3 Fatty Acids (fish oil) 1,000 mg Take 1,000 mg by mouth daily     • omeprazole (PriLOSEC) 20 mg delayed release capsule Take 1 capsule (20 mg total) by mouth daily before breakfast 30 capsule 5   • OneTouch Delica Lancets 33G MISC Use 4 (four) times a day Test blood sugar 3 to 4 times daily 300 each 2   • docusate sodium (COLACE) 100 mg capsule Take 100 mg by mouth 2 (two) times a day as needed   (Patient not taking: Reported on 3/3/2025)     • insulin aspart (NovoLOG FlexPen) 100 UNIT/ML injection pen Inject 10 Units under the skin 3 (three) times a day with meals 30 mL 2   • insulin lispro (HumaLOG KwikPen) 100 units/mL injection pen Inject 10 Units under the skin 3 (three) times a day with meals 30 mL 5   • methylcellulose (CITRUCEL) 500 mg tablet Take 1,000 mg by mouth daily (Patient not taking: Reported on 3/3/2025)     • miconazole (MONISTAT-7) 2 % vaginal cream Insert 1 applicator into the vagina daily at bedtime (Patient not taking: Reported on 3/3/2025) 45 g 3   • Multiple Vitamin (MULTI-VITAMIN DAILY PO) Take by mouth (Patient not taking: Reported on 3/3/2025)       No current facility-administered medications on file prior to visit.      Social History     Tobacco Use   • Smoking status: Former     Current packs/day: 0.00     Average packs/day: 0.3 packs/day for 2.0 years (0.5 ttl pk-yrs)     Types: Cigarettes     Start date:      Quit date:      Years since quittin.1   • Smokeless tobacco: Never   Vaping Use   • Vaping status: Never Used   Substance and Sexual Activity   • Alcohol  "use: Not Currently     Comment: Stopped drinking alcohol    • Drug use: No   • Sexual activity: Yes     Partners: Male     Birth control/protection: Post-menopausal        Objective  /64 (BP Location: Left arm, Patient Position: Sitting, Cuff Size: Standard)   Pulse 75   Temp 97.8 °F (36.6 °C) (Temporal)   Ht 5' 3\" (1.6 m)   Wt 50.8 kg (112 lb)   SpO2 98%   BMI 19.84 kg/m²      Physical Exam  Vitals and nursing note reviewed.   Constitutional:       General: She is not in acute distress.     Appearance: She is well-developed.   HENT:      Head: Normocephalic and atraumatic.   Eyes:      Conjunctiva/sclera: Conjunctivae normal.   Cardiovascular:      Rate and Rhythm: Normal rate and regular rhythm.      Heart sounds: No murmur heard.  Pulmonary:      Effort: Pulmonary effort is normal. No respiratory distress.      Breath sounds: Normal breath sounds.   Abdominal:      Palpations: Abdomen is soft.      Tenderness: There is no abdominal tenderness.   Musculoskeletal:         General: No swelling.      Cervical back: Neck supple.   Skin:     General: Skin is warm and dry.      Capillary Refill: Capillary refill takes less than 2 seconds.      Comments: 28 mm x 28 mm recurrent epidermoid cysts of the upper mid back   Neurological:      Mental Status: She is alert.   Psychiatric:         Mood and Affect: Mood normal.     Biopsy    Date/Time: 3/3/2025 10:00 AM    Performed by: Sylvester Rubin MD  Authorized by: Sylvester Rubin MD  Universal Protocol:  Consent: Verbal consent obtained.  Risks and benefits: risks, benefits and alternatives were discussed  Consent given by: patient  Time out: Immediately prior to procedure a \"time out\" was called to verify the correct patient, procedure, equipment, support staff and site/side marked as required.  Timeout called at: 3/3/2025 10:36 AM.  Patient understanding: patient states understanding of the procedure being performed  Patient consent: the patient's " understanding of the procedure matches consent given  Site marked: the operative site was marked  Patient identity confirmed: verbally with patient    Procedure Details - Lesion Biopsy:     Biopsy tissue type: superficial soft tissue  Soft tissue trunk location: back    Malignancy: benign lesion      Destruction method comment:  Excisional biopsy     Procedure note: With informed verbal consent under sterile conditions using aseptic technique using 1% lidocaine local anesthesia with epinephrine and bicarbonate the 28 mm epidermoid cyst was sharply excised with a #15 blade and the wound closed primarily with vertical mattress sutures of 3-0 nylon        Administrative Statements  I have spent a total time of 20 minutes in caring for this patient on the day of the visit/encounter including Risks and benefits of tx options.

## 2025-03-03 NOTE — ASSESSMENT & PLAN NOTE
The patient is a pleasant 59-year-old female presenting with a recurrent epidermoid cyst of the upper mid back for which definitive treatment by excisional biopsy is now indicated.    The technical details of the procedure as well as the options benefits risks and alternatives to excision were thoroughly reviewed with the patient.    She understands that recurrent cyst formation remains a possibility either in the same or similar location.    All questions answered to the satisfaction of the patient and informed verbal consent obtained to proceed.

## 2025-03-10 PROCEDURE — 88305 TISSUE EXAM BY PATHOLOGIST: CPT | Performed by: PATHOLOGY

## 2025-03-14 NOTE — PROGRESS NOTES
Name: Corinne Khalil      : 1965      MRN: [id number]  Encounter Provider: Ignacio Fitzgerald PA-C  Encounter Date: 3/17/2025   Encounter department: [facility] GENERAL SURGERY [place]  :  Assessment & Plan  Epidermoid cyst of skin of back  Here for follow-up after excision of suspected epidermoid cyst of the upper back. Reports prior I&D x 2 and prior excision x 1 before seeing Dr. Rubin. Feels frustrated and anxious about possibility of a systemic problem or cancer or other diagnosis. Sutures were removed and band aid applied, appears to be healing well. Pathology report provided and reviewed showing granulomatous inflammation, no signs of cyst fragments reported. Suspect she developed residual granulation tissue that never fully healed from her prior cyst removal resulting in need for excision with Dr. Rubin. To allay her concerns about the diagnosis I offered a 6 month follow-up skin check to look for signs of residual disease or other chronic process. Questions answered, agreeable to the plan.           History of Present Illness   HPI  Corinne Khalil is a 59 y.o. female who presents for suture removal, exc of epidermoid back cyst 25. Pt states the incision is healed and denies any drainage, redness/irritation or fevers/chills but reports pain where the incision is when touched or washed.  Pt states she take pain relief pm and its been helping with the pain. JOHN Faulkner  History obtained from: patient    Review of Systems   All other systems reviewed and are negative.    Past Medical History   Past Medical History:   Diagnosis Date    Arthritis     Colonic polyp     resolved: 2017    Diabetes mellitus (HCC)     Dry eye syndrome of bilateral lacrimal glands     last assessed: 05/15/2015    GERD (gastroesophageal reflux disease)     History of screening mammography     last assessed: 2016    Hypertension     Lyme disease     Osteoarthritis     last assessed: 2016     RA (rheumatoid arthritis) (HCC)     Rheumatoid arteritis (HCC)     Rheumatoid arthritis (HCC)     osteoarthritis    Stroke (HCC)     Symptomatic hypotension     last assessed: 03/22/2017    Ventral hernia with obstruction and without gangrene     last assessed: 02/16/2017    Vertigo      Past Surgical History:   Procedure Laterality Date    BACK SURGERY      CYST REMOVAL      EGD AND COLONOSCOPY N/A 2/3/2017    Procedure: COLONOSCOPY; POSSIBLE POLYPECTOMY; POSSIBLE BIOPSY AND EGD ;  Surgeon: Feliberto Manrique MD;  Location: [facility];  Service:     HERNIA REPAIR      LIPOMA RESECTION      OTHER SURGICAL HISTORY      excision of sebaceous cst to back    TUBAL LIGATION      VENTRAL HERNIA REPAIR N/A 3/17/2017    Procedure: LAPAROSCOPIC VENTRAL HERNIA REPAIR WITH MESH ;  Surgeon: Feliberto Manrique MD;  Location: [facility];  Service:      Family History   Problem Relation Age of Onset    COPD Mother         COPD/Emphysema    Diabetes Mother     Heart disease Father     Stroke Father     COPD Father         COPD/Emphysema    Heart attack Father     No Known Problems Sister     No Known Problems Sister     No Known Problems Sister     No Known Problems Sister     Depression Daughter     No Known Problems Maternal Grandmother     No Known Problems Maternal Grandfather     Diabetes Paternal Grandmother     No Known Problems Paternal Grandfather     Breast cancer Paternal Aunt     No Known Problems Brother     No Known Problems Son     No Known Problems Maternal Aunt       reports that she quit smoking about 35 years ago. Her smoking use included cigarettes. She started smoking about 37 years ago. She has a 0.5 pack-year smoking history. She has never used smokeless tobacco. She reports that she does not currently use alcohol. She reports that she does not use drugs.  Current Outpatient Medications   Medication Instructions    ascorbic acid (VITAMIN C) 500 mg, Oral, Daily    aspirin (ECOTRIN LOW STRENGTH) 81 mg, Daily     atorvastatin (LIPITOR) 10 mg, Oral, Daily    Biotin w/ Vitamins C & E (HAIR/SKIN/NAILS PO) 2 capsules, Daily    Blood Glucose Monitoring Suppl (ONE TOUCH ULTRA 2) w/Device KIT Does not apply, 4 times daily    calcium carbonate (TUMS) 500 mg chewable tablet 1 tablet, Daily    Continuous Blood Gluc  (FreeStyle [place] 2 [place]) MARGOTH Check blood sugar multiple times daily    Continuous Blood Gluc Sensor (FreeStyle [place] 2 Sensor) MISC 1 each, Does not apply, Every 14 days    cyanocobalamin (VITAMIN B-12) 500 mcg, Daily    dimenhyDRINATE (DRAMAMINE) 50 mg, 2 times daily    docusate sodium (COLACE) 100 mg, 2 times daily PRN    fish oil 1,000 mg, Daily    glucose blood (OneTouch Ultra) test strip Test blood sugar 3 to 4 times daily    hydrocortisone (ANUSOL-HC) 2.5 % rectal cream Topical, 2 times daily    insulin aspart (NOVOLOG FLEXPEN) 10 Units, Subcutaneous, 3 times daily with meals    insulin degludec (Tresiba FlexTouch) 200 units/mL CONCENTRATED U-200 injection pen Inject 10 units at 11:00 a.m. And 15 units at evening meal    insulin lispro (HUMALOG KWIKPEN) 10 Units, Subcutaneous, 3 times daily with meals    Insulin Pen Needle (BD Pen Needle Ignacia 2nd Gen) 32G X 4 MM MISC Subcutaneous, 3 times daily    metFORMIN (GLUCOPHAGE) 1,000 mg, Oral, 2 times daily with meals    methylcellulose (CITRUCEL) 1,000 mg, Daily    miconazole (MONISTAT-7) 2 % vaginal cream 1 applicator, Vaginal, Daily at bedtime    Multiple Vitamin (MULTI-VITAMIN DAILY PO) Take by mouth    omeprazole (PRILOSEC) 20 mg, Oral, Daily before breakfast    OneTouch Delica Lancets 33G MISC Does not apply, 4 times daily, Test blood sugar 3 to 4 times daily     Allergies   Allergen Reactions    Penicillins Rash         Objective   Temp 98.6 °F (37 °C) (Temporal)      Physical Exam  Vitals and nursing note reviewed.   Constitutional:       General: She is not in acute distress.     Appearance: She is well-developed. She is not diaphoretic.   HENT:       Head: Normocephalic and atraumatic.   Eyes:      Conjunctiva/sclera: Conjunctivae normal.      Pupils: Pupils are equal, round, and reactive to light.   Pulmonary:      Effort: No respiratory distress.   Musculoskeletal:         General: Normal range of motion.      Cervical back: Normal range of motion.   Skin:     General: Skin is warm and dry.      Capillary Refill: Capillary refill takes less than 2 seconds.      Comments: C/D/I healing well without infection.   Neurological:      Mental Status: She is alert and oriented to person, place, and time.   Psychiatric:         Behavior: Behavior normal.

## 2025-03-17 ENCOUNTER — OFFICE VISIT (OUTPATIENT)
Dept: SURGERY | Facility: CLINIC | Age: 60
End: 2025-03-17

## 2025-03-17 VITALS — TEMPERATURE: 98.6 F

## 2025-03-17 DIAGNOSIS — L72.0 EPIDERMOID CYST OF SKIN OF BACK: Primary | ICD-10-CM

## 2025-03-17 PROCEDURE — 99024 POSTOP FOLLOW-UP VISIT: CPT | Performed by: PHYSICIAN ASSISTANT

## 2025-03-17 NOTE — ASSESSMENT & PLAN NOTE
Here for follow-up after excision of suspected epidermoid cyst of the upper back. Reports prior I&D x 2 and prior excision x 1 before seeing Dr. Rubin. Feels frustrated and anxious about possibility of a systemic problem or cancer or other diagnosis. Sutures were removed and band aid applied, appears to be healing well. Pathology report provided and reviewed showing granulomatous inflammation, no signs of cyst fragments reported. Suspect she developed residual granulation tissue that never fully healed from her prior cyst removal resulting in need for excision with Dr. Rubin. To allay her concerns about the diagnosis I offered a 6 month follow-up skin check to look for signs of residual disease or other chronic process. Questions answered, agreeable to the plan.

## 2025-04-04 ENCOUNTER — OFFICE VISIT (OUTPATIENT)
Dept: FAMILY MEDICINE CLINIC | Facility: CLINIC | Age: 60
End: 2025-04-04
Payer: COMMERCIAL

## 2025-04-04 VITALS
RESPIRATION RATE: 20 BRPM | BODY MASS INDEX: 19.42 KG/M2 | OXYGEN SATURATION: 94 % | HEIGHT: 63 IN | HEART RATE: 93 BPM | TEMPERATURE: 97.3 F | DIASTOLIC BLOOD PRESSURE: 76 MMHG | SYSTOLIC BLOOD PRESSURE: 118 MMHG | WEIGHT: 109.6 LBS

## 2025-04-04 DIAGNOSIS — K21.9 GASTROESOPHAGEAL REFLUX DISEASE WITHOUT ESOPHAGITIS: ICD-10-CM

## 2025-04-04 DIAGNOSIS — M05.9 RHEUMATOID ARTHRITIS WITH POSITIVE RHEUMATOID FACTOR, INVOLVING UNSPECIFIED SITE (HCC): ICD-10-CM

## 2025-04-04 DIAGNOSIS — E11.65 UNCONTROLLED TYPE 2 DIABETES MELLITUS WITH HYPERGLYCEMIA (HCC): ICD-10-CM

## 2025-04-04 DIAGNOSIS — I10 ESSENTIAL HYPERTENSION: ICD-10-CM

## 2025-04-04 DIAGNOSIS — R05.9 COUGH IN ADULT: Primary | ICD-10-CM

## 2025-04-04 DIAGNOSIS — J20.8 ACUTE BACTERIAL BRONCHITIS: ICD-10-CM

## 2025-04-04 DIAGNOSIS — E11.00 TYPE II DIABETES MELLITUS WITH HYPEROSMOLARITY, UNCONTROLLED (HCC): ICD-10-CM

## 2025-04-04 DIAGNOSIS — F32.2 SEVERE MAJOR DEPRESSIVE DISORDER (HCC): ICD-10-CM

## 2025-04-04 DIAGNOSIS — B96.89 ACUTE BACTERIAL BRONCHITIS: ICD-10-CM

## 2025-04-04 LAB
SARS-COV-2 AG UPPER RESP QL IA: NEGATIVE
SL AMB POCT RAPID FLU A: NORMAL
SL AMB POCT RAPID FLU B: NORMAL
VALID CONTROL: NORMAL

## 2025-04-04 PROCEDURE — 87811 SARS-COV-2 COVID19 W/OPTIC: CPT | Performed by: FAMILY MEDICINE

## 2025-04-04 PROCEDURE — 99214 OFFICE O/P EST MOD 30 MIN: CPT | Performed by: FAMILY MEDICINE

## 2025-04-04 PROCEDURE — 87804 INFLUENZA ASSAY W/OPTIC: CPT | Performed by: FAMILY MEDICINE

## 2025-04-04 RX ORDER — AZITHROMYCIN 250 MG/1
TABLET, FILM COATED ORAL
Qty: 6 TABLET | Refills: 0 | Status: SHIPPED | OUTPATIENT
Start: 2025-04-04 | End: 2025-04-09

## 2025-04-04 RX ORDER — INSULIN LISPRO 100 [IU]/ML
10 INJECTION, SOLUTION INTRAVENOUS; SUBCUTANEOUS
Qty: 30 ML | Refills: 5 | Status: SHIPPED | OUTPATIENT
Start: 2025-04-04

## 2025-04-04 RX ORDER — INSULIN DEGLUDEC 200 U/ML
INJECTION, SOLUTION SUBCUTANEOUS
Qty: 12 ML | Refills: 3 | Status: SHIPPED | OUTPATIENT
Start: 2025-04-04

## 2025-04-04 RX ORDER — DEXTROMETHORPHAN HYDROBROMIDE AND PROMETHAZINE HYDROCHLORIDE 15; 6.25 MG/5ML; MG/5ML
5 SYRUP ORAL 4 TIMES DAILY PRN
Qty: 240 ML | Refills: 1 | Status: SHIPPED | OUTPATIENT
Start: 2025-04-04

## 2025-04-04 NOTE — ASSESSMENT & PLAN NOTE
Aditi Hidalgo is a 81 y.o. female presents to office for hospital follow up for chest pain      Medication list has been reviewed with Aditi Hidalgo and updated as of today's date     Health Maintenance items with a due date reviewed with patient:  There are no preventive care reminders to display for this patient. Depression Screening Follow-up Plan: Patient's depression screening was positive with a PHQ-2 score of 3. Their PHQ-9 score was 9. Patient with underlying depression and was advised to continue current medications as prescribed.

## 2025-04-04 NOTE — ASSESSMENT & PLAN NOTE
Stable monitor and reevaluate at next office visit flareup occurs during current bronchial infection

## 2025-04-04 NOTE — ASSESSMENT & PLAN NOTE
Orders:    insulin degludec (Tresiba FlexTouch) 200 units/mL CONCENTRATED U-200 injection pen; Inject 10 units at 11:00 a.m. And 15 units at evening meal

## 2025-04-04 NOTE — ASSESSMENT & PLAN NOTE
Patient developed cough congestion came in today for further evaluation as she is not getting better.  COVID flu tested negative and now we will start her on a Zithromax Z-JERMAIN and if not improved follow-up with me next week also provided cough medication Promethazine DM    Orders:    azithromycin (ZITHROMAX) 250 mg tablet; Take 2 tablets on day 1 and 1 tablet days 2-5    promethazine-dextromethorphan (PHENERGAN-DM) 6.25-15 mg/5 mL oral syrup; Take 5 mL by mouth 4 (four) times a day as needed for cough

## 2025-04-04 NOTE — ASSESSMENT & PLAN NOTE
Lab Results   Component Value Date    HGBA1C 13.0 (H) 01/27/2024   Follow-up A1c at next office visit continue medications unchanged    Orders:    Comprehensive metabolic panel; Future    CBC and differential; Future    Lipid panel; Future    TSH, 3rd generation with Free T4 reflex; Future    Hemoglobin A1C; Future    insulin degludec (Tresiba FlexTouch) 200 units/mL CONCENTRATED U-200 injection pen; Inject 10 units at 11:00 a.m. And 15 units at evening meal

## 2025-04-04 NOTE — ASSESSMENT & PLAN NOTE
Repeat A1c renew insulin patient has been out of her medication  Lab Results   Component Value Date    HGBA1C 13.0 (H) 01/27/2024       Orders:    insulin lispro (HumaLOG KwikPen) 100 units/mL injection pen; Inject 10 Units under the skin 3 (three) times a day with meals

## 2025-04-04 NOTE — ASSESSMENT & PLAN NOTE
Orders:    POCT Rapid Covid Ag    POCT rapid flu A and B    azithromycin (ZITHROMAX) 250 mg tablet; Take 2 tablets on day 1 and 1 tablet days 2-5    promethazine-dextromethorphan (PHENERGAN-DM) 6.25-15 mg/5 mL oral syrup; Take 5 mL by mouth 4 (four) times a day as needed for cough

## 2025-04-07 ENCOUNTER — TELEPHONE (OUTPATIENT)
Age: 60
End: 2025-04-07

## 2025-04-07 ENCOUNTER — OFFICE VISIT (OUTPATIENT)
Dept: FAMILY MEDICINE CLINIC | Facility: CLINIC | Age: 60
End: 2025-04-07
Payer: COMMERCIAL

## 2025-04-07 VITALS
WEIGHT: 110.6 LBS | SYSTOLIC BLOOD PRESSURE: 132 MMHG | OXYGEN SATURATION: 95 % | HEIGHT: 63 IN | HEART RATE: 78 BPM | DIASTOLIC BLOOD PRESSURE: 67 MMHG | RESPIRATION RATE: 20 BRPM | BODY MASS INDEX: 19.6 KG/M2 | TEMPERATURE: 98.2 F

## 2025-04-07 DIAGNOSIS — B96.89 ACUTE BACTERIAL BRONCHITIS: ICD-10-CM

## 2025-04-07 DIAGNOSIS — E11.65 UNCONTROLLED TYPE 2 DIABETES MELLITUS WITH HYPERGLYCEMIA (HCC): ICD-10-CM

## 2025-04-07 DIAGNOSIS — J20.8 ACUTE BACTERIAL BRONCHITIS: ICD-10-CM

## 2025-04-07 DIAGNOSIS — R05.9 COUGH IN ADULT: Primary | ICD-10-CM

## 2025-04-07 DIAGNOSIS — I10 ESSENTIAL HYPERTENSION: ICD-10-CM

## 2025-04-07 PROCEDURE — 87811 SARS-COV-2 COVID19 W/OPTIC: CPT | Performed by: NURSE PRACTITIONER

## 2025-04-07 PROCEDURE — 87804 INFLUENZA ASSAY W/OPTIC: CPT | Performed by: NURSE PRACTITIONER

## 2025-04-07 PROCEDURE — 99214 OFFICE O/P EST MOD 30 MIN: CPT | Performed by: NURSE PRACTITIONER

## 2025-04-07 RX ORDER — ALBUTEROL SULFATE 90 UG/1
2 INHALANT RESPIRATORY (INHALATION) EVERY 6 HOURS PRN
Qty: 18 G | Refills: 1 | Status: SHIPPED | OUTPATIENT
Start: 2025-04-07 | End: 2025-04-18 | Stop reason: SDUPTHER

## 2025-04-07 RX ORDER — DOXYCYCLINE HYCLATE 100 MG
100 TABLET ORAL 2 TIMES DAILY
Qty: 14 TABLET | Refills: 0 | Status: SHIPPED | OUTPATIENT
Start: 2025-04-07 | End: 2025-04-14

## 2025-04-07 NOTE — ASSESSMENT & PLAN NOTE
Monitor closely during acute sick phase  Continue current regimen  Lab Results   Component Value Date    HGBA1C 13.0 (H) 01/27/2024

## 2025-04-07 NOTE — ASSESSMENT & PLAN NOTE
Orders:    doxycycline hyclate (VIBRA-TABS) 100 mg tablet; Take 1 tablet (100 mg total) by mouth 2 (two) times a day for 7 days    albuterol (PROVENTIL HFA,VENTOLIN HFA) 90 mcg/act inhaler; Inhale 2 puffs every 6 (six) hours as needed for wheezing

## 2025-04-07 NOTE — PROGRESS NOTES
Name: Corinne Khalil      : 1965      MRN: [id number]  Encounter Provider: Cleo Aquino DNP  Encounter Date: 2025   Encounter department: [facility]  :  Assessment & Plan  Acute bacterial bronchitis    Orders:    doxycycline hyclate (VIBRA-TABS) 100 mg tablet; Take 1 tablet (100 mg total) by mouth 2 (two) times a day for 7 days    albuterol (PROVENTIL HFA,VENTOLIN HFA) 90 mcg/act inhaler; Inhale 2 puffs every 6 (six) hours as needed for wheezing    Cough in adult    Orders:    POCT rapid flu A and B    POCT Rapid Covid Ag    Essential hypertension  Controlled today, avoid frequent use of decongestants        Uncontrolled type 2 diabetes mellitus with hyperglycemia (HCC)  Monitor closely during acute sick phase  Continue current regimen  Lab Results   Component Value Date    HGBA1C 13.0 (H) 2024                   History of Present Illness   Cough  Associated symptoms include rhinorrhea and a sore throat. Pertinent negatives include no chest pain, chills, ear pain, fever, headaches, myalgias, shortness of breath or wheezing.     Review of Systems   Constitutional:  Positive for activity change and fatigue. Negative for chills and fever.   HENT:  Positive for congestion, rhinorrhea, sore throat and voice change. Negative for ear discharge, ear pain, sinus pressure, sinus pain, tinnitus and trouble swallowing.    Eyes:  Negative for photophobia, pain, discharge, itching and visual disturbance.   Respiratory:  Positive for cough. Negative for chest tightness, shortness of breath and wheezing.    Cardiovascular:  Negative for chest pain and leg swelling.   Gastrointestinal:  Negative for abdominal distention, abdominal pain, constipation, diarrhea, nausea and vomiting.   Endocrine: Negative for polydipsia, polyphagia and polyuria.   Genitourinary:  Negative for dysuria and frequency.   Musculoskeletal:  Negative for arthralgias, myalgias, neck pain and neck stiffness.  "  Skin:  Negative for color change.   Neurological:  Negative for dizziness, syncope, weakness, numbness and headaches.   Hematological:  Does not bruise/bleed easily.   Psychiatric/Behavioral:  Negative for behavioral problems, confusion, self-injury, sleep disturbance and suicidal ideas. The patient is not nervous/anxious.        Objective   /67 (BP Location: Left arm, Patient Position: Sitting, Cuff Size: Standard)   Pulse 78   Temp 98.2 °F (36.8 °C) (Tympanic)   Resp 20   Ht 5' 3\" (1.6 m)   Wt 50.2 kg (110 lb 9.6 oz)   SpO2 95%   BMI 19.59 kg/m²      Physical Exam  Vitals and nursing note reviewed.   Constitutional:       Appearance: Normal appearance.   HENT:      Head: Normocephalic and atraumatic.      Nose: Mucosal edema, congestion and rhinorrhea present.   Cardiovascular:      Rate and Rhythm: Normal rate and regular rhythm.      Pulses: Normal pulses.      Heart sounds: Normal heart sounds.   Pulmonary:      Breath sounds: Wheezing and rhonchi present.   Neurological:      Mental Status: She is alert.         "

## 2025-04-07 NOTE — TELEPHONE ENCOUNTER
PA for Humalog KwikPen SUBMITTED to [employer]    via    [x]CMM-KEY: [id number]  []Surescripts-Case ID #   []Availity-Auth ID # NDC #   []Faxed to plan   []Other website   []Phone call Case ID #     []PA sent as URGENT    All office notes, labs and other pertaining documents and studies sent. Clinical questions answered. Awaiting determination from insurance company.     Turnaround time for your insurance to make a decision on your Prior Authorization can take 7-21 business days.

## 2025-04-08 NOTE — TELEPHONE ENCOUNTER
PA for (HumaLOG KwikPen) NOT REQUIRED     Reason (screenshot if applicable)        Patient advised by          [] MyChart Message  [] Phone call   []LMOM  []L/M to call office as no active Communication consent on file  []Unable to leave detailed message as VM not approved on Communication consent       Pharmacy advised by    [x]Fax  []Phone call

## 2025-04-09 ENCOUNTER — TELEPHONE (OUTPATIENT)
Age: 60
End: 2025-04-09

## 2025-04-09 DIAGNOSIS — B96.89 ACUTE BACTERIAL BRONCHITIS: ICD-10-CM

## 2025-04-09 DIAGNOSIS — J20.8 ACUTE BACTERIAL BRONCHITIS: ICD-10-CM

## 2025-04-09 NOTE — TELEPHONE ENCOUNTER
Patient called,    States insulin medication is over $90.00. patient doesn't know if the cost was for 2 medications or for one. Patient to call pharmacy to find out and will call back to let us know.

## 2025-04-09 NOTE — TELEPHONE ENCOUNTER
Patient called in, says she is still sick, doesn't think its bronchitis. Son and her  had it. Some local friends as well. She thinks it's a virus going around. Wants to know what PCP advises. Thank you!     Corinne: [phone]

## 2025-04-09 NOTE — TELEPHONE ENCOUNTER
Acknowledged    Spoke with pt and she will stay on her current meds to see how she feels at the end of the week.

## 2025-04-09 NOTE — TELEPHONE ENCOUNTER
Corinne called and did state that the $90 is just for the Tresiba FlexTouch and will be looking into getting the medication from the company([employer] she is also just looking to print out a coupon.

## 2025-04-18 ENCOUNTER — TELEPHONE (OUTPATIENT)
Dept: FAMILY MEDICINE CLINIC | Facility: CLINIC | Age: 60
End: 2025-04-18

## 2025-04-18 DIAGNOSIS — B96.89 ACUTE BACTERIAL BRONCHITIS: Primary | ICD-10-CM

## 2025-04-18 DIAGNOSIS — B96.89 ACUTE BACTERIAL BRONCHITIS: ICD-10-CM

## 2025-04-18 DIAGNOSIS — E11.00 TYPE II DIABETES MELLITUS WITH HYPEROSMOLARITY, UNCONTROLLED (HCC): ICD-10-CM

## 2025-04-18 DIAGNOSIS — J20.8 ACUTE BACTERIAL BRONCHITIS: ICD-10-CM

## 2025-04-18 DIAGNOSIS — E11.65 UNCONTROLLED TYPE 2 DIABETES MELLITUS WITH HYPERGLYCEMIA (HCC): Primary | ICD-10-CM

## 2025-04-18 DIAGNOSIS — J20.8 ACUTE BACTERIAL BRONCHITIS: Primary | ICD-10-CM

## 2025-04-18 RX ORDER — ALBUTEROL SULFATE 90 UG/1
2 INHALANT RESPIRATORY (INHALATION) EVERY 6 HOURS PRN
Qty: 18 G | Refills: 1 | Status: CANCELLED | OUTPATIENT
Start: 2025-04-18 | End: 2025-05-18

## 2025-04-18 RX ORDER — ALBUTEROL SULFATE 90 UG/1
2 INHALANT RESPIRATORY (INHALATION) EVERY 6 HOURS PRN
Qty: 18 G | Refills: 1 | Status: SHIPPED | OUTPATIENT
Start: 2025-04-18 | End: 2025-05-18

## 2025-04-18 RX ORDER — PEN NEEDLE, DIABETIC 32GX 5/32"
NEEDLE, DISPOSABLE MISCELLANEOUS 4 TIMES DAILY
Qty: 100 EACH | Refills: 11 | Status: SHIPPED | OUTPATIENT
Start: 2025-04-18

## 2025-04-18 RX ORDER — AZITHROMYCIN 250 MG/1
TABLET, FILM COATED ORAL
Qty: 6 TABLET | Refills: 0 | Status: SHIPPED | OUTPATIENT
Start: 2025-04-18 | End: 2025-04-23

## 2025-04-18 RX ORDER — INSULIN GLARGINE 300 U/ML
INJECTION, SOLUTION SUBCUTANEOUS
Qty: 6 ML | Refills: 5 | Status: SHIPPED | OUTPATIENT
Start: 2025-04-18

## 2025-04-18 NOTE — TELEPHONE ENCOUNTER
Patient states when she goes outside the infection bothers more, and she stated she feels it running down the back of her throat and it comes out her nose. Patient requesting antibiotics

## 2025-04-18 NOTE — TELEPHONE ENCOUNTER
Pt called back, said her insurance did approved the Humalog, it will only cost her $45, asking if that also could be called into the pharmacy as well so she can  everything as well as the antibiotics. Please advise.

## 2025-04-18 NOTE — TELEPHONE ENCOUNTER
Patient called in regarding checking the status of the antibiotic that she said was supposed to be sent over for her.     Patient also stated that she was just at the pharmacy she picked up the Insulin Lispro. Patient said that she needs another cheaper medication called in then the tresiba. Pt states the tresiba is $90 and she already told us and the insurance before that she needs a cheaper insulin that is similar to the tresiba.     Patient also states she doesn't have pen needles and is requesting these be sent over to the [facility] pharmacy. Please advise as it looks like currently the pen needles are marked as not taking     Please advise and call patient back

## 2025-04-18 NOTE — TELEPHONE ENCOUNTER
Pt called asking if you can send her antibiotic to her pharm   She stated she isn't feeling any better and everyone in   the home is sick

## 2025-04-18 NOTE — TELEPHONE ENCOUNTER
Pt called, still not feeling well, coughing still present, still feels it in her chest. Asking if inhaler could be refilled and possibly another round of antibiotics. Also wondering if we ever heard anything about the Humalog Kwikpen from her insurance? Seems that was sent to pharmacy already. Please advise and give pt a call back.

## 2025-05-04 PROBLEM — R05.9 COUGH IN ADULT: Status: RESOLVED | Noted: 2025-04-04 | Resolved: 2025-05-04

## 2025-05-04 PROBLEM — B96.89 ACUTE BACTERIAL BRONCHITIS: Status: RESOLVED | Noted: 2025-04-04 | Resolved: 2025-05-04

## 2025-05-04 PROBLEM — J20.8 ACUTE BACTERIAL BRONCHITIS: Status: RESOLVED | Noted: 2025-04-04 | Resolved: 2025-05-04

## 2025-05-06 ENCOUNTER — TELEPHONE (OUTPATIENT)
Dept: FAMILY MEDICINE CLINIC | Facility: CLINIC | Age: 60
End: 2025-05-06

## 2025-05-06 NOTE — TELEPHONE ENCOUNTER
Tried to call pt to remind of their appt today 5/6/2025 as a NO SHOW. Pt did not answer left a VM to call office back to reschedule.

## 2025-06-12 ENCOUNTER — TELEPHONE (OUTPATIENT)
Dept: FAMILY MEDICINE CLINIC | Facility: CLINIC | Age: 60
End: 2025-06-12

## 2025-06-16 ENCOUNTER — TELEPHONE (OUTPATIENT)
Dept: FAMILY MEDICINE CLINIC | Facility: CLINIC | Age: 60
End: 2025-06-16

## 2025-08-07 ENCOUNTER — TELEPHONE (OUTPATIENT)
Dept: FAMILY MEDICINE CLINIC | Facility: CLINIC | Age: 60
End: 2025-08-07

## 2025-08-09 ENCOUNTER — APPOINTMENT (OUTPATIENT)
Dept: LAB | Facility: CLINIC | Age: 60
End: 2025-08-09
Payer: COMMERCIAL

## 2025-08-11 ENCOUNTER — TELEPHONE (OUTPATIENT)
Dept: FAMILY MEDICINE CLINIC | Facility: CLINIC | Age: 60
End: 2025-08-11

## 2025-08-12 ENCOUNTER — OFFICE VISIT (OUTPATIENT)
Dept: FAMILY MEDICINE CLINIC | Facility: CLINIC | Age: 60
End: 2025-08-12
Payer: COMMERCIAL

## 2025-08-13 ENCOUNTER — TELEPHONE (OUTPATIENT)
Age: 60
End: 2025-08-13

## 2025-08-15 ENCOUNTER — NURSE TRIAGE (OUTPATIENT)
Age: 60
End: 2025-08-15

## 2025-08-16 DIAGNOSIS — E11.65 UNCONTROLLED TYPE 2 DIABETES MELLITUS WITH HYPERGLYCEMIA (HCC): ICD-10-CM

## (undated) DEVICE — ENDOPATH XCEL UNIVERSAL TROCAR STABLILITY SLEEVES: Brand: ENDOPATH XCEL

## (undated) DEVICE — GLOVE SRG BIOGEL ECLIPSE 7.5

## (undated) DEVICE — LIGHT HANDLE COVER SLEEVE DISP BLUE STELLAR

## (undated) DEVICE — NEEDLE 22 G X 1 1/2 SAFETY

## (undated) DEVICE — TUBING SMOKE EVAC W/FILTRATION DEVICE PLUMEPORT ACTIV

## (undated) DEVICE — ENDOPOUCH RETRIEVER SPECIMEN RETRIEVAL BAGS: Brand: ENDOPOUCH RETRIEVER

## (undated) DEVICE — ALLENTOWN LAP CHOLE APP PACK: Brand: CARDINAL HEALTH

## (undated) DEVICE — AEM CORD

## (undated) DEVICE — SORBAFIX ABSORBABLE FIXATION SYSTEM 30 ABSORBABLE FASTENERS: Brand: SORBAFIX ABSORBABLE FIXATION SYSTEM

## (undated) DEVICE — SUT VICRYL 0 REEL 54 IN J287G

## (undated) DEVICE — LIGHT HANDLE COVER CAMERA DISP

## (undated) DEVICE — SCD SEQUENTIAL COMPRESSION COMFORT SLEEVE MEDIUM KNEE LENGTH: Brand: KENDALL SCD

## (undated) DEVICE — ENDOPATH XCEL BLADELESS TROCARS WITH STABILITY SLEEVES: Brand: ENDOPATH XCEL

## (undated) DEVICE — 3M™ STERI-STRIP™ REINFORCED ADHESIVE SKIN CLOSURES, R1546, 1/4 IN X 4 IN (6 MM X 100 MM), 10 STRIPS/ENVELOPE: Brand: 3M™ STERI-STRIP™

## (undated) DEVICE — GAUZE SPONGES,USP TYPE VII GAUZE, 12 PLY: Brand: CURITY

## (undated) DEVICE — INTENDED FOR TISSUE SEPARATION, AND OTHER PROCEDURES THAT REQUIRE A SHARP SURGICAL BLADE TO PUNCTURE OR CUT.: Brand: BARD-PARKER SAFETY BLADES SIZE 15, STERILE

## (undated) DEVICE — GLOVE INDICATOR PI UNDERGLOVE SZ 7.5 BLUE

## (undated) DEVICE — SUT VICRYL 0 UR-6 27 IN J603H

## (undated) DEVICE — REM POLYHESIVE ADULT PATIENT RETURN ELECTRODE: Brand: VALLEYLAB

## (undated) DEVICE — 3M™ TEGADERM™ TRANSPARENT FILM DRESSING FRAME STYLE, 1624W, 2-3/8 IN X 2-3/4 IN (6 CM X 7 CM), 100/CT 4CT/CASE: Brand: 3M™ TEGADERM™

## (undated) DEVICE — GAUZE SPONGES,16 PLY: Brand: CURITY

## (undated) DEVICE — 3M™ TEGADERM™ TRANSPARENT FILM DRESSING FRAME STYLE, 1626W, 4 IN X 4-3/4 IN (10 CM X 12 CM), 50/CT 4CT/CASE: Brand: 3M™ TEGADERM™

## (undated) DEVICE — SUT VICRYL 1 CT-1 27 IN J261H

## (undated) DEVICE — SUT VICRYL 4-0 PS-2 27 IN J426H

## (undated) DEVICE — CHLORAPREP HI-LITE 26ML ORANGE